# Patient Record
Sex: MALE | Race: BLACK OR AFRICAN AMERICAN | NOT HISPANIC OR LATINO | Employment: OTHER | URBAN - METROPOLITAN AREA
[De-identification: names, ages, dates, MRNs, and addresses within clinical notes are randomized per-mention and may not be internally consistent; named-entity substitution may affect disease eponyms.]

---

## 2018-07-09 PROBLEM — I48.0 PAROXYSMAL ATRIAL FIBRILLATION: Status: ACTIVE | Noted: 2018-07-09

## 2018-07-09 PROBLEM — I48.91 NEW ONSET ATRIAL FIBRILLATION: Status: ACTIVE | Noted: 2018-07-09

## 2018-07-09 PROBLEM — S06.5XAA SUBDURAL HEMATOMA: Status: ACTIVE | Noted: 2018-07-09

## 2018-07-09 PROBLEM — R20.0 NUMBNESS OF LEFT FOOT: Status: ACTIVE | Noted: 2018-07-09

## 2018-07-10 PROBLEM — I63.9 CVA (CEREBRAL VASCULAR ACCIDENT): Status: ACTIVE | Noted: 2018-07-10

## 2018-07-10 PROBLEM — I10 ESSENTIAL HYPERTENSION: Status: ACTIVE | Noted: 2018-07-10

## 2018-07-13 ENCOUNTER — PATIENT OUTREACH (OUTPATIENT)
Dept: ADMINISTRATIVE | Facility: CLINIC | Age: 78
End: 2018-07-13

## 2018-07-13 NOTE — PATIENT INSTRUCTIONS
"Paraesthesias  Paraesthesia is a burning or prickling sensation that is sometimes felt in the hands, arms, legs or feet. It can also occur in other parts of the body. It can also feel like tingling or numbness, skin crawling, or itching. The feeling is not comfortable, but it is not painful. (The "pins and needles" feeling that happens when a foot or hand "falls asleep" is a temporary paraesthesia.)  Paraesthesias that last or come and go may be caused by medical issues that need to be treated. These include stroke, a bulging disk pressing on a nerve, a trapped nerve, vitamin deficiencies, or even certain medicines.  Tests are often done. These tests may include blood tests, X-ray, CT (computerized tomography) scan, or a muscle test (electromyography). Depending on the cause, treatment may include physical therapy.  Home care  · Tell the healthcare provider about all medicines you take. This includes prescription and over-the-counter medicines, vitamins, and herbs. Ask if any of the medicines may be causing your problems. Do not make any changes to prescription medicines without talking to your healthcare provider first.  · You may be prescribed medicines to help relieve the tingling feeling or for pain. Take all medicines as directed.  · A numb hand or foot may be more prone to injury. To help protect it:  ? Always use oven mitts.  ? Test water with an unaffected hand or foot.  ? Use caution when trimming nails. File sharp areas.  ? Wear shoes that fit well to avoid pressure points, blisters, and ulcers.  ? Inspect your hands and feet carefully (including the soles of your feet and between your toes) at least once a week. If you see red areas, sores, or other problems, tell your healthcare provider.  Follow-up care  Follow up with your doctor or as advised by our staff. You may need further testing or evaluation.  When to seek medical advice  Call your healthcare provider right away if any of the following " occur:  · Numbness or weakness of the face, one arm, or one leg  · Slurred speech, confusion, trouble speaking, walking, or seeing  · Severe headache, fainting spell, dizziness, or seizure  · Chest, arm, neck, or upper back pain  · Loss of bladder or bowel control  · Open wound with redness, swelling, or pus  Date Last Reviewed: 9/25/2015  © 2729-7043 [employer]. [address] [place] [address], [place] [address]. All rights reserved. This information is not intended as a substitute for professional medical care. Always follow your healthcare professional's instructions.

## 2019-01-01 ENCOUNTER — HOSPITAL ENCOUNTER (OUTPATIENT)
Dept: RADIOLOGY | Facility: HOSPITAL | Age: 79
Discharge: HOME | End: 2019-06-21
Attending: PHYSICIAN ASSISTANT
Payer: MEDICARE

## 2019-01-01 ENCOUNTER — OFFICE VISIT (OUTPATIENT)
Dept: NEUROSURGERY | Facility: CLINIC | Age: 79
End: 2019-01-01
Payer: MEDICARE

## 2019-01-01 ENCOUNTER — TELEPHONE (OUTPATIENT)
Dept: NEUROSURGERY | Facility: CLINIC | Age: 79
End: 2019-01-01

## 2019-01-01 VITALS
HEIGHT: 68 IN | HEART RATE: 83 BPM | WEIGHT: 202 LBS | SYSTOLIC BLOOD PRESSURE: 127 MMHG | DIASTOLIC BLOOD PRESSURE: 75 MMHG | BODY MASS INDEX: 30.62 KG/M2 | TEMPERATURE: 98 F

## 2019-01-01 DIAGNOSIS — G93.89 BRAIN MASS: Primary | ICD-10-CM

## 2019-01-01 DIAGNOSIS — G93.89 BRAIN MASS: ICD-10-CM

## 2019-01-01 LAB
CREAT SERPL-MCNC: 0.9 MG/DL (ref 0.5–1.4)
SAMPLE: NORMAL

## 2019-01-01 PROCEDURE — 70553 MRI BRAIN STEM W/O & W/DYE: CPT | Mod: TC

## 2019-01-01 PROCEDURE — 25500020 PHARM REV CODE 255: Performed by: PHYSICIAN ASSISTANT

## 2019-01-01 PROCEDURE — A9585 GADOBUTROL INJECTION: HCPCS | Performed by: PHYSICIAN ASSISTANT

## 2019-01-01 PROCEDURE — 99999 PR PBB SHADOW E&M-EST. PATIENT-LVL III: CPT | Mod: PBBFAC,,, | Performed by: NEUROLOGICAL SURGERY

## 2019-01-01 PROCEDURE — 99213 OFFICE O/P EST LOW 20 MIN: CPT | Mod: PBBFAC,25 | Performed by: NEUROLOGICAL SURGERY

## 2019-01-01 PROCEDURE — 70553 MRI BRAIN STEM W/O & W/DYE: CPT | Mod: 26,,, | Performed by: RADIOLOGY

## 2019-01-01 PROCEDURE — 99214 PR OFFICE/OUTPT VISIT, EST, LEVL IV, 30-39 MIN: ICD-10-PCS | Mod: S$PBB,,, | Performed by: NEUROLOGICAL SURGERY

## 2019-01-01 PROCEDURE — 70553 MRI BRAIN W WO CONTRAST: ICD-10-PCS | Mod: 26,,, | Performed by: RADIOLOGY

## 2019-01-01 PROCEDURE — 99214 OFFICE O/P EST MOD 30 MIN: CPT | Mod: S$PBB,,, | Performed by: NEUROLOGICAL SURGERY

## 2019-01-01 PROCEDURE — 99999 PR PBB SHADOW E&M-EST. PATIENT-LVL III: ICD-10-PCS | Mod: PBBFAC,,, | Performed by: NEUROLOGICAL SURGERY

## 2019-01-01 RX ORDER — GADOBUTROL 604.72 MG/ML
10 INJECTION INTRAVENOUS
Status: COMPLETED | OUTPATIENT
Start: 2019-01-01 | End: 2019-01-01

## 2019-01-01 RX ADMIN — GADOBUTROL 10 ML: 604.72 INJECTION INTRAVENOUS at 11:06

## 2019-03-16 PROBLEM — R41.82 ALTERED MENTAL STATUS: Status: ACTIVE | Noted: 2019-03-16

## 2019-03-17 PROBLEM — D64.9 NORMOCYTIC ANEMIA: Status: ACTIVE | Noted: 2019-03-17

## 2019-03-17 PROBLEM — R80.1 PERSISTENT PROTEINURIA: Status: ACTIVE | Noted: 2019-03-17

## 2019-03-17 PROBLEM — I63.411 CEREBROVASCULAR ACCIDENT (CVA) DUE TO EMBOLISM OF RIGHT MIDDLE CEREBRAL ARTERY: Status: ACTIVE | Noted: 2018-07-10

## 2019-03-17 PROBLEM — I48.21 PERMANENT ATRIAL FIBRILLATION: Status: ACTIVE | Noted: 2018-07-09

## 2019-03-17 PROBLEM — C79.51 METASTATIC CARCINOMA TO BONE: Status: ACTIVE | Noted: 2019-03-17

## 2019-03-17 PROBLEM — E11.51 TYPE 2 DIABETES MELLITUS WITH DIABETIC PERIPHERAL ANGIOPATHY WITHOUT GANGRENE, WITHOUT LONG-TERM CURRENT USE OF INSULIN: Status: ACTIVE | Noted: 2019-03-17

## 2019-03-18 PROBLEM — R80.9 NEPHROTIC RANGE PROTEINURIA: Status: ACTIVE | Noted: 2019-03-17

## 2019-03-18 PROBLEM — R63.8 INCREASED NUTRITIONAL NEEDS: Status: ACTIVE | Noted: 2019-03-18

## 2019-03-18 PROBLEM — E63.9 INADEQUATE DIETARY ENERGY INTAKE: Status: ACTIVE | Noted: 2019-03-18

## 2019-03-18 PROBLEM — E55.9 VITAMIN D DEFICIENCY: Status: ACTIVE | Noted: 2019-03-18

## 2019-03-19 PROBLEM — R97.20 ELEVATED PSA: Status: ACTIVE | Noted: 2019-03-19

## 2019-03-19 PROBLEM — R56.9 SEIZURES: Status: ACTIVE | Noted: 2019-03-19

## 2019-03-26 ENCOUNTER — HOSPITAL ENCOUNTER (INPATIENT)
Facility: HOSPITAL | Age: 79
LOS: 8 days | Discharge: HOME HEALTH | DRG: 070 | End: 2019-04-03
Attending: HOSPITALIST | Admitting: HOSPITALIST
Payer: MEDICARE

## 2019-03-26 DIAGNOSIS — G93.89 BRAIN MASS: ICD-10-CM

## 2019-03-26 DIAGNOSIS — I48.91 A-FIB: ICD-10-CM

## 2019-03-26 DIAGNOSIS — I48.91 ATRIAL FIBRILLATION, UNSPECIFIED TYPE: ICD-10-CM

## 2019-03-26 DIAGNOSIS — Z71.89 GOALS OF CARE, COUNSELING/DISCUSSION: ICD-10-CM

## 2019-03-26 DIAGNOSIS — R56.9 SEIZURES: ICD-10-CM

## 2019-03-26 DIAGNOSIS — I63.411 CEREBROVASCULAR ACCIDENT (CVA) DUE TO EMBOLISM OF RIGHT MIDDLE CEREBRAL ARTERY: ICD-10-CM

## 2019-03-26 DIAGNOSIS — Z51.5 PALLIATIVE CARE ENCOUNTER: ICD-10-CM

## 2019-03-26 PROCEDURE — 11000001 HC ACUTE MED/SURG PRIVATE ROOM

## 2019-03-26 NOTE — PROGRESS NOTES
Please call extension [phone] (if nobody answers, this will flip to a beeper, so put in your call back number) upon patient arrival to floor for Hospital Medicine admit team assignment and for additional admit orders for the patient.  Do not page the attending, staff physician associate with the patient on arrival (may not be in-house at the time of arrival).  Rather, always call [phone] to reach the triage physician for orders and team assignment.        Outside Transfer Acceptance Note     Patients name: Bari Hogan     Transferring Physician or Mid-Level provider/Clinic giving report: Johnny Mike MD (Internist, [place])    Accepting Physician for admission to hospital: Ethel Edwards MD     Date of acceptance:  03/26/2019    Allergies: NKDA     Reason for transfer: Need for biopsy of left frontal brain mass/cranial erosion     HPI:  This Is are 79-year-old male with hypertension, CVA, and type 2 diabetes mellitus presented with altered mental status on 03/16/2019.  An MRI of the brain was done which was significant for a left frontal meningeal mass with mild local mass effect and adjacent osseous erosion. The differential includes dural metastasis and atypical meningioma.  There was also a subacute to chronic right temporoparietal infarct.  An EEG was done which was consistent with seizure activity and the patient was loaded with Keppra and this has continued for prophylaxis.  Bone scan revealed lytic lesions in the left frontal bone and vertebral column.  The patient had biopsy of the L2 spine and kyphoplasty.  The results were significant for mild plasmacytosis and patchy mild marrow fibrosis. The needs cranial biopsy for appropriate diagnosis and subsequent treatment plan.    VS: BP  169/84  P 84  R 17    T 96.7F  O2  Sats 97% RA    Labs:  K+  3.3    Radiographs: As above     To Do List upon arrival:  Consult Neurosurgery for brain biopsy (cranial sampling)

## 2019-03-27 PROBLEM — I73.9 PERIPHERAL VASCULAR DISEASE OF LOWER EXTREMITY: Status: ACTIVE | Noted: 2019-03-27

## 2019-03-27 PROBLEM — R23.9 ALTERATION IN SKIN INTEGRITY: Status: ACTIVE | Noted: 2019-03-27

## 2019-03-27 LAB
ALBUMIN SERPL BCP-MCNC: 2.2 G/DL (ref 3.5–5.2)
ALP SERPL-CCNC: 73 U/L (ref 55–135)
ALT SERPL W/O P-5'-P-CCNC: 10 U/L (ref 10–44)
ANION GAP SERPL CALC-SCNC: 11 MMOL/L (ref 8–16)
APTT BLDCRRT: 26.2 SEC (ref 21–32)
AST SERPL-CCNC: 17 U/L (ref 10–40)
BASOPHILS # BLD AUTO: 0.04 K/UL (ref 0–0.2)
BASOPHILS NFR BLD: 0.5 % (ref 0–1.9)
BILIRUB SERPL-MCNC: 0.4 MG/DL (ref 0.1–1)
BUN SERPL-MCNC: 8 MG/DL (ref 8–23)
CALCIUM SERPL-MCNC: 9 MG/DL (ref 8.7–10.5)
CHLORIDE SERPL-SCNC: 109 MMOL/L (ref 95–110)
CO2 SERPL-SCNC: 21 MMOL/L (ref 23–29)
CREAT SERPL-MCNC: 1.1 MG/DL (ref 0.5–1.4)
DIFFERENTIAL METHOD: ABNORMAL
EOSINOPHIL # BLD AUTO: 0.2 K/UL (ref 0–0.5)
EOSINOPHIL NFR BLD: 3.1 % (ref 0–8)
ERYTHROCYTE [DISTWIDTH] IN BLOOD BY AUTOMATED COUNT: 16.9 % (ref 11.5–14.5)
EST. GFR  (AFRICAN AMERICAN): >60 ML/MIN/1.73 M^2
EST. GFR  (NON AFRICAN AMERICAN): >60 ML/MIN/1.73 M^2
ESTIMATED AVG GLUCOSE: 194 MG/DL (ref 68–131)
GLUCOSE SERPL-MCNC: 99 MG/DL (ref 70–110)
HBA1C MFR BLD HPLC: 8.4 % (ref 4–5.6)
HCT VFR BLD AUTO: 38.6 % (ref 40–54)
HGB BLD-MCNC: 11.8 G/DL (ref 14–18)
IMM GRANULOCYTES # BLD AUTO: 0.04 K/UL (ref 0–0.04)
IMM GRANULOCYTES NFR BLD AUTO: 0.5 % (ref 0–0.5)
INR PPP: 1 (ref 0.8–1.2)
LYMPHOCYTES # BLD AUTO: 1.3 K/UL (ref 1–4.8)
LYMPHOCYTES NFR BLD: 17 % (ref 18–48)
MAGNESIUM SERPL-MCNC: 1.2 MG/DL (ref 1.6–2.6)
MCH RBC QN AUTO: 26.3 PG (ref 27–31)
MCHC RBC AUTO-ENTMCNC: 30.6 G/DL (ref 32–36)
MCV RBC AUTO: 86 FL (ref 82–98)
MONOCYTES # BLD AUTO: 0.7 K/UL (ref 0.3–1)
MONOCYTES NFR BLD: 8.3 % (ref 4–15)
NEUTROPHILS # BLD AUTO: 5.5 K/UL (ref 1.8–7.7)
NEUTROPHILS NFR BLD: 70.6 % (ref 38–73)
NRBC BLD-RTO: 0 /100 WBC
PHOSPHATE SERPL-MCNC: 2.5 MG/DL (ref 2.7–4.5)
PLATELET # BLD AUTO: 390 K/UL (ref 150–350)
PMV BLD AUTO: 11.5 FL (ref 9.2–12.9)
POCT GLUCOSE: 106 MG/DL (ref 70–110)
POCT GLUCOSE: 110 MG/DL (ref 70–110)
POCT GLUCOSE: 116 MG/DL (ref 70–110)
POCT GLUCOSE: 123 MG/DL (ref 70–110)
POCT GLUCOSE: 98 MG/DL (ref 70–110)
POTASSIUM SERPL-SCNC: 3.6 MMOL/L (ref 3.5–5.1)
PROT SERPL-MCNC: 7 G/DL (ref 6–8.4)
PROTHROMBIN TIME: 10.8 SEC (ref 9–12.5)
RBC # BLD AUTO: 4.49 M/UL (ref 4.6–6.2)
SODIUM SERPL-SCNC: 141 MMOL/L (ref 136–145)
TSH SERPL DL<=0.005 MIU/L-ACNC: 0.91 UIU/ML (ref 0.4–4)
WBC # BLD AUTO: 7.81 K/UL (ref 3.9–12.7)

## 2019-03-27 PROCEDURE — 25000003 PHARM REV CODE 250: Performed by: STUDENT IN AN ORGANIZED HEALTH CARE EDUCATION/TRAINING PROGRAM

## 2019-03-27 PROCEDURE — 99223 1ST HOSP IP/OBS HIGH 75: CPT | Mod: ,,, | Performed by: PHYSICIAN ASSISTANT

## 2019-03-27 PROCEDURE — 85025 COMPLETE CBC W/AUTO DIFF WBC: CPT

## 2019-03-27 PROCEDURE — 93010 EKG 12-LEAD: ICD-10-PCS | Mod: ,,, | Performed by: INTERNAL MEDICINE

## 2019-03-27 PROCEDURE — 36415 COLL VENOUS BLD VENIPUNCTURE: CPT

## 2019-03-27 PROCEDURE — 25500020 PHARM REV CODE 255: Performed by: HOSPITALIST

## 2019-03-27 PROCEDURE — 84443 ASSAY THYROID STIM HORMONE: CPT

## 2019-03-27 PROCEDURE — 99223 1ST HOSP IP/OBS HIGH 75: CPT | Mod: AI,GC,, | Performed by: HOSPITALIST

## 2019-03-27 PROCEDURE — A9585 GADOBUTROL INJECTION: HCPCS | Performed by: HOSPITALIST

## 2019-03-27 PROCEDURE — 80053 COMPREHEN METABOLIC PANEL: CPT

## 2019-03-27 PROCEDURE — 99223 PR INITIAL HOSPITAL CARE,LEVL III: ICD-10-PCS | Mod: AI,GC,, | Performed by: HOSPITALIST

## 2019-03-27 PROCEDURE — 83036 HEMOGLOBIN GLYCOSYLATED A1C: CPT

## 2019-03-27 PROCEDURE — 99223 PR INITIAL HOSPITAL CARE,LEVL III: ICD-10-PCS | Mod: ,,, | Performed by: PHYSICIAN ASSISTANT

## 2019-03-27 PROCEDURE — 11000001 HC ACUTE MED/SURG PRIVATE ROOM

## 2019-03-27 PROCEDURE — 83735 ASSAY OF MAGNESIUM: CPT

## 2019-03-27 PROCEDURE — 93005 ELECTROCARDIOGRAM TRACING: CPT

## 2019-03-27 PROCEDURE — 85610 PROTHROMBIN TIME: CPT

## 2019-03-27 PROCEDURE — 93010 ELECTROCARDIOGRAM REPORT: CPT | Mod: ,,, | Performed by: INTERNAL MEDICINE

## 2019-03-27 PROCEDURE — 63600175 PHARM REV CODE 636 W HCPCS: Performed by: STUDENT IN AN ORGANIZED HEALTH CARE EDUCATION/TRAINING PROGRAM

## 2019-03-27 PROCEDURE — 85730 THROMBOPLASTIN TIME PARTIAL: CPT

## 2019-03-27 PROCEDURE — 84100 ASSAY OF PHOSPHORUS: CPT

## 2019-03-27 RX ORDER — SODIUM CHLORIDE 0.9 % (FLUSH) 0.9 %
5 SYRINGE (ML) INJECTION
Status: DISCONTINUED | OUTPATIENT
Start: 2019-03-27 | End: 2019-04-04 | Stop reason: HOSPADM

## 2019-03-27 RX ORDER — GLUCAGON 1 MG
1 KIT INJECTION
Status: DISCONTINUED | OUTPATIENT
Start: 2019-03-27 | End: 2019-03-29

## 2019-03-27 RX ORDER — ALLOPURINOL 100 MG/1
200 TABLET ORAL DAILY
Status: ON HOLD | COMMUNITY
End: 2019-04-03 | Stop reason: SDUPTHER

## 2019-03-27 RX ORDER — HEPARIN SODIUM,PORCINE/D5W 25000/250
12 INTRAVENOUS SOLUTION INTRAVENOUS CONTINUOUS
Status: DISCONTINUED | OUTPATIENT
Start: 2019-03-27 | End: 2019-04-01

## 2019-03-27 RX ORDER — DICLOFENAC SODIUM 10 MG/G
2 GEL TOPICAL DAILY PRN
Status: ON HOLD | COMMUNITY
End: 2019-04-26 | Stop reason: HOSPADM

## 2019-03-27 RX ORDER — AMLODIPINE BESYLATE 10 MG/1
10 TABLET ORAL DAILY
Status: DISCONTINUED | OUTPATIENT
Start: 2019-03-27 | End: 2019-04-04 | Stop reason: HOSPADM

## 2019-03-27 RX ORDER — DOCUSATE SODIUM 100 MG/1
100 CAPSULE, LIQUID FILLED ORAL DAILY
Status: DISCONTINUED | OUTPATIENT
Start: 2019-03-27 | End: 2019-03-29 | Stop reason: ALTCHOICE

## 2019-03-27 RX ORDER — ATORVASTATIN CALCIUM 20 MG/1
40 TABLET, FILM COATED ORAL DAILY
Status: DISCONTINUED | OUTPATIENT
Start: 2019-03-27 | End: 2019-04-04 | Stop reason: HOSPADM

## 2019-03-27 RX ORDER — ASCORBIC ACID 500 MG
500 TABLET ORAL 3 TIMES DAILY
Status: DISCONTINUED | OUTPATIENT
Start: 2019-03-27 | End: 2019-04-04 | Stop reason: HOSPADM

## 2019-03-27 RX ORDER — GADOBUTROL 604.72 MG/ML
10 INJECTION INTRAVENOUS
Status: COMPLETED | OUTPATIENT
Start: 2019-03-27 | End: 2019-03-27

## 2019-03-27 RX ORDER — LEVETIRACETAM 750 MG/1
750 TABLET ORAL 2 TIMES DAILY
Status: DISCONTINUED | OUTPATIENT
Start: 2019-03-27 | End: 2019-04-04 | Stop reason: HOSPADM

## 2019-03-27 RX ORDER — IBUPROFEN 200 MG
16 TABLET ORAL
Status: DISCONTINUED | OUTPATIENT
Start: 2019-03-27 | End: 2019-04-04 | Stop reason: HOSPADM

## 2019-03-27 RX ORDER — ACETAMINOPHEN 325 MG/1
650 TABLET ORAL ONCE
Status: COMPLETED | OUTPATIENT
Start: 2019-03-27 | End: 2019-03-27

## 2019-03-27 RX ORDER — GLUCAGON 1 MG
1 KIT INJECTION
Status: DISCONTINUED | OUTPATIENT
Start: 2019-03-27 | End: 2019-03-27

## 2019-03-27 RX ORDER — METOPROLOL SUCCINATE 25 MG/1
25 TABLET, EXTENDED RELEASE ORAL DAILY
Status: ON HOLD | COMMUNITY
End: 2019-04-03 | Stop reason: SDUPTHER

## 2019-03-27 RX ORDER — FERROUS GLUCONATE 324(38)MG
325 TABLET ORAL
Status: DISCONTINUED | OUTPATIENT
Start: 2019-03-27 | End: 2019-03-30

## 2019-03-27 RX ORDER — INSULIN ASPART 100 [IU]/ML
0-5 INJECTION, SOLUTION INTRAVENOUS; SUBCUTANEOUS EVERY 6 HOURS PRN
Status: DISCONTINUED | OUTPATIENT
Start: 2019-03-27 | End: 2019-03-29

## 2019-03-27 RX ORDER — IBUPROFEN 200 MG
24 TABLET ORAL
Status: DISCONTINUED | OUTPATIENT
Start: 2019-03-27 | End: 2019-04-04 | Stop reason: HOSPADM

## 2019-03-27 RX ADMIN — OXYCODONE HYDROCHLORIDE AND ACETAMINOPHEN 500 MG: 500 TABLET ORAL at 02:03

## 2019-03-27 RX ADMIN — LEVETIRACETAM 750 MG: 750 TABLET, FILM COATED ORAL at 09:03

## 2019-03-27 RX ADMIN — AMLODIPINE BESYLATE 10 MG: 10 TABLET ORAL at 12:03

## 2019-03-27 RX ADMIN — DOCUSATE SODIUM 100 MG: 100 CAPSULE, LIQUID FILLED ORAL at 12:03

## 2019-03-27 RX ADMIN — ACETAMINOPHEN 650 MG: 325 TABLET ORAL at 01:03

## 2019-03-27 RX ADMIN — GADOBUTROL 10 ML: 604.72 INJECTION INTRAVENOUS at 05:03

## 2019-03-27 RX ADMIN — HEPARIN SODIUM AND DEXTROSE 12 UNITS/KG/HR: 10000; 5 INJECTION INTRAVENOUS at 08:03

## 2019-03-27 RX ADMIN — FERROUS GLUCONATE TAB 324 MG (37.5 MG ELEMENTAL IRON) 324 MG: 324 (37.5 FE) TAB at 07:03

## 2019-03-27 RX ADMIN — OXYCODONE HYDROCHLORIDE AND ACETAMINOPHEN 500 MG: 500 TABLET ORAL at 09:03

## 2019-03-27 RX ADMIN — ATORVASTATIN CALCIUM 40 MG: 20 TABLET, FILM COATED ORAL at 12:03

## 2019-03-27 RX ADMIN — METOPROLOL SUCCINATE 12.5 MG: 25 TABLET, EXTENDED RELEASE ORAL at 09:03

## 2019-03-27 NOTE — HPI
Mr Hogan is a 79 y.o M w/ a history significant for hypertension, CVA, and type 2 diabetes mellitus presented to OSH w/ altered mental status on 03/16/2019.  An MRI of the brain was done which was significant for a left frontal meningeal mass with mild local mass effect and adjacent osseous erosion. The differential includes dural metastasis and atypical meningioma.  There was also a subacute to chronic right temporoparietal infarct.  An EEG was done which was consistent with seizure activity and the patient was loaded with Keppra and this has continued for prophylaxis.  Bone scan revealed lytic lesions in the left frontal bone and vertebral column.  The patient had biopsy of the L2 spine and kyphoplasty.  The results were significant for mild plasmacytosis and patchy mild marrow fibrosis.     [place] [facility] [place]ities while patient awaits brain biopsy by neurosurgery

## 2019-03-27 NOTE — SUBJECTIVE & OBJECTIVE
Past Medical History:   Diagnosis Date    Arthritis     Chronic constipation     Diabetes mellitus     Hypertension     Stroke     x2       Past Surgical History:   Procedure Laterality Date    Kyphoplasty-L2 and biopsy N/A 3/20/2019    Performed by Satrhak Son MD at [facility] OR       Review of patient's allergies indicates:   Allergen Reactions    Lisinopril      dizziness       Current Facility-Administered Medications on File Prior to Encounter   Medication    [COMPLETED] potassium chloride SA CR tablet 30 mEq    [DISCONTINUED] acetaminophen tablet 325 mg    [DISCONTINUED] albuterol-ipratropium 2.5 mg-0.5 mg/3 mL nebulizer solution 3 mL    [DISCONTINUED] atorvastatin tablet 40 mg    [DISCONTINUED] cyproheptadine 4 mg tablet 4 mg    [DISCONTINUED] dextrose 50% injection 12.5 g    [DISCONTINUED] dextrose 50% injection 25 g    [DISCONTINUED] docusate sodium capsule 100 mg    [DISCONTINUED] enoxaparin injection 40 mg    [DISCONTINUED] ergocalciferol capsule 50,000 Units    [DISCONTINUED] ferrous sulfate tablet 325 mg    [DISCONTINUED] glucagon (human recombinant) injection 1 mg    [DISCONTINUED] glucose chewable tablet 16 g    [DISCONTINUED] glucose chewable tablet 24 g    [DISCONTINUED] HYDROmorphone injection 0.5 mg    [DISCONTINUED] insulin aspart U-100 injection 1-14 Units    [DISCONTINUED] levETIRAcetam tablet 750 mg    [DISCONTINUED] ondansetron injection 4 mg    [DISCONTINUED] pantoprazole EC tablet 40 mg    [DISCONTINUED] polyethylene glycol packet 17 g     Current Outpatient Medications on File Prior to Encounter   Medication Sig    acetaminophen (TYLENOL) 325 MG tablet Take 1 tablet (325 mg total) by mouth every 6 (six) hours as needed.    albuterol-ipratropium (DUO-NEB) 2.5 mg-0.5 mg/3 mL nebulizer solution Take 3 mLs by nebulization every 8 (eight) hours. Rescue    amLODIPine (NORVASC) 10 MG tablet Take 10 mg by mouth once daily.    apixaban 5 mg Tab Take 1 tablet (5 mg  total) by mouth 2 (two) times daily.    atorvastatin (LIPITOR) 40 MG tablet Take 1 tablet (40 mg total) by mouth once daily.    cyproheptadine (PERIACTIN) 4 mg tablet Take 1 tablet (4 mg total) by mouth 3 (three) times daily before meals.    dextrose 50% injection Inject 25 mLs (12.5 g total) into the vein as needed (between 51 - 70 mg/dL if patient cannnot take PO but has IV access.).    dextrose 50% injection Inject 50 mLs (25 g total) into the vein as needed (less than 50 mg/dL if patient cannnot take PO but has IV access.).    docusate sodium (COLACE) 100 MG capsule Take 1 capsule (100 mg total) by mouth once daily.    [START ON 4/1/2019] ergocalciferol (ERGOCALCIFEROL) 50,000 unit Cap Take 1 capsule (50,000 Units total) by mouth every 7 days.    ferrous gluconate (FERGON) 325 MG Tab Take 325 mg by mouth daily with breakfast.    ferrous sulfate (FEOSOL) 325 mg (65 mg iron) Tab tablet Take 1 tablet (325 mg total) by mouth 2 (two) times daily.    glipiZIDE (GLUCOTROL) 10 MG tablet Take 10 mg by mouth 2 (two) times daily before meals.    glucose 4 GM chewable tablet Take 4 tablets (16 g total) by mouth as needed.    glucose 4 GM chewable tablet Take 6 tablets (24 g total) by mouth as needed.    insulin aspart U-100 (NOVOLOG) 100 unit/mL injection Inject 1-14 Units into the skin before meals and at bedtime as needed.    levETIRAcetam (KEPPRA) 750 MG Tab Take 1 tablet (750 mg total) by mouth 2 (two) times daily.    ondansetron 4 mg/2 mL Soln Inject 4 mg into the vein every 6 (six) hours as needed.    pantoprazole (PROTONIX) 40 MG tablet Take 1 tablet (40 mg total) by mouth once daily.    polyethylene glycol (GLYCOLAX) 17 gram PwPk Take 17 g by mouth once daily.    psyllium (METAMUCIL) packet Take 1 packet by mouth once daily.    terazosin (HYTRIN) 2 MG capsule Take 2 mg by mouth every evening.    triamterene-hydrochlorothiazide 75-50 mg (MAXZIDE) 75-50 mg per tablet Take 1 tablet by mouth once  daily.     Family History     None        Tobacco Use    Smoking status: Former Smoker   Substance and Sexual Activity    Alcohol use: No    Drug use: No    Sexual activity: Not on file     Review of Systems   Constitutional: Positive for activity change. Negative for appetite change and fever.   HENT: Negative for trouble swallowing.    Respiratory: Negative for cough, shortness of breath and wheezing.    Cardiovascular: Negative for chest pain and palpitations.   Gastrointestinal: Positive for constipation. Negative for abdominal pain, nausea and vomiting.   Genitourinary: Negative for decreased urine volume and hematuria.        [place]    Musculoskeletal: Positive for gait problem. Negative for back pain.        Left AKA   Skin: Positive for wound (right lateral leg  ulcers ).   Neurological: Positive for weakness (L>R). Negative for dizziness, seizures and speech difficulty.   Psychiatric/Behavioral: Negative for confusion. The patient is not nervous/anxio      Objective:     Vital Signs (Most Recent):  Temp: 97.5 °F (36.4 °C) (03/26/19 2316)  Pulse: 91 (03/26/19 2316)  Resp: 20 (03/26/19 2316)  BP: (!) 160/73 (03/26/19 2316)  SpO2: 98 % (03/26/19 2316) Vital Signs (24h Range):  Temp:  [96.1 °F (35.6 °C)-98 °F (36.7 °C)] 97.5 °F (36.4 °C)  Pulse:  [82-93] 91  Resp:  [16-20] 20  SpO2:  [98 %] 98 %  BP: (135-169)/(73-88) 160/73     Weight: 89.8 kg (198 lb)  Body mass index is 30.11 kg/m².    Constitutional: He is oriented to person, place, and time. He appears well-developed and well-nourished. He is cooperative.   HENT:   Head: Normocephalic and atraumatic.   Eyes: Pupils are equal, round, and reactive to light. Conjunctivae and EOM are normal.   Neck: Normal range of motion. Neck supple.   Cardiovascular: Normal rate and regular rhythm.   Pulmonary/Chest: Effort normal and breath sounds normal.   Abdominal: Soft. Bowel sounds are normal.   Musculoskeletal:   LEFT AKA    Neurological: He is alert and oriented  to person, place, and time.   Skin: Skin is warm and dry.         Physical Exam   Constitutional: He is oriented to person, place, and time. He appears well-developed and well-nourished.   HENT:   Head: Normocephalic and atraumatic.   Eyes: Pupils are equal, round, and reactive to light. EOM are normal.   Neck: Normal range of motion. Neck supple.   Cardiovascular: Normal rate and regular rhythm.   Pulmonary/Chest: Effort normal and breath sounds normal.   Abdominal: Soft. Bowel sounds are normal.   Musculoskeletal: Normal range of motion. He exhibits deformity.   Increased tone/spasticity of the LUE throughout (chronic, dt old stroke)       Neurological: He is alert and oriented to person, place, and time.   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  Extraocular motions are normal.        Significant Labs: All pertinent labs within the past 24 hours have been reviewed.    Significant Imaging: I have reviewed all pertinent imaging results/findings within the past 24 hours.

## 2019-03-27 NOTE — ASSESSMENT & PLAN NOTE
As noted on CT abd pelvis.  Source of these metastases is unclear.  Cannot rule out the possibility of CNS metastasis as well. Bone scan showing left frontal meningeal mass and lytic lesion on L2 consistent metastasis. There was also no lesions/mass seen in the chest which could indicate possible primary source in the  tract or thyroid. Thyroid ultrasound with no evidence of malignancy   - s/p L2 lesion pathology findings showing a 5-10% representation of plasma cells. This could be secondary to an abnormal reaction to something or the beginnings of multiple myeloma. The small percentage doesn't qualify for MM but it could be a result of such diseases including lymphoma or  waldenstom's macroglobulinemia. Patient can also have isolated plasmacytoma in the brain. If such is diagnosed then the treatment may require localized radiation.     - Patient will need biopsy of brain mass to confirm.   - urine kappa/lambda 1.61

## 2019-03-27 NOTE — ASSESSMENT & PLAN NOTE
Patient is a 79 year old male with PMHx HTN, DM2, recurrent Afib and CVAs on Eliquis who presents as a transfer from [facility] for cranial biopsy. Presented to [facility] on 3/16 with suspected seizure activity, currently on Keppra 750mg BID for ppx.     - Neurologically stable  - MRI brain significant for left frontal meningioma with mild mass effect and bony erosion and subacute to chronic right temporoparietal infarct.  - Will obtain MRI brain Ste[facility] without fudicials for surgical planning.   - Plan for OR Tuesday 4/2 for left frontal craniotomy for tumor resection  - Will need documented surgical clearance from primary team.   - Continue Keppra 750mg BID.   - Dc Eliquis today in preparation for surgery  - Remainder of care per primary  - Will continue to follow. Please page with any questions or changes in exam.   - Discussed with Dr. Campoverde.

## 2019-03-27 NOTE — ASSESSMENT & PLAN NOTE
MRI of the brain with contrast revealed:  1. 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  Differential includes dural metastasis an atypical meningioma.  2. 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    Left frontal meningeal mass has been present on imaging since July 2018.     ?? Whether the left frontal meningeal mass with bony erosion is something other than the vertebral lesion @ L2. The L2 biopsy results are as follows:   - Mild plasmacytosis.   - Patchy mild marrow fibrosis.   - Focal crush artifact.   - Results of outside consultative review and final diagnosis to follow  in an addendum report.      - NPO at midnight  - neurosurgery consult

## 2019-03-27 NOTE — ASSESSMENT & PLAN NOTE
Cannot rule out the possibility of embolic event      - repeat EKG  - will start metoprolol 12.5mg for rate control   - consider restarting anticoagulation after biopsy

## 2019-03-27 NOTE — SUBJECTIVE & OBJECTIVE
Medications Prior to Admission   Medication Sig Dispense Refill Last Dose    acetaminophen (TYLENOL) 325 MG tablet Take 1 tablet (325 mg total) by mouth every 6 (six) hours as needed.  0     albuterol-ipratropium (DUO-NEB) 2.5 mg-0.5 mg/3 mL nebulizer solution Take 3 mLs by nebulization every 8 (eight) hours. Rescue 1 Box 0     amLODIPine (NORVASC) 10 MG tablet Take 10 mg by mouth once daily.   Taking    apixaban 5 mg Tab Take 1 tablet (5 mg total) by mouth 2 (two) times daily. 60 tablet 0 Taking    atorvastatin (LIPITOR) 40 MG tablet Take 1 tablet (40 mg total) by mouth once daily. 90 tablet 3     cyproheptadine (PERIACTIN) 4 mg tablet Take 1 tablet (4 mg total) by mouth 3 (three) times daily before meals.  0     dextrose 50% injection Inject 25 mLs (12.5 g total) into the vein as needed (between 51 - 70 mg/dL if patient cannnot take PO but has IV access.).       dextrose 50% injection Inject 50 mLs (25 g total) into the vein as needed (less than 50 mg/dL if patient cannnot take PO but has IV access.).       docusate sodium (COLACE) 100 MG capsule Take 1 capsule (100 mg total) by mouth once daily.  0     [START ON 4/1/2019] ergocalciferol (ERGOCALCIFEROL) 50,000 unit Cap Take 1 capsule (50,000 Units total) by mouth every 7 days.       ferrous gluconate (FERGON) 325 MG Tab Take 325 mg by mouth daily with breakfast.   Taking    ferrous sulfate (FEOSOL) 325 mg (65 mg iron) Tab tablet Take 1 tablet (325 mg total) by mouth 2 (two) times daily.  0     glipiZIDE (GLUCOTROL) 10 MG tablet Take 10 mg by mouth 2 (two) times daily before meals.   Taking    glucose 4 GM chewable tablet Take 4 tablets (16 g total) by mouth as needed.  12     glucose 4 GM chewable tablet Take 6 tablets (24 g total) by mouth as needed.  12     insulin aspart U-100 (NOVOLOG) 100 unit/mL injection Inject 1-14 Units into the skin before meals and at bedtime as needed.       levETIRAcetam (KEPPRA) 750 MG Tab Take 1 tablet (750 mg  total) by mouth 2 (two) times daily. 60 tablet 11     ondansetron 4 mg/2 mL Soln Inject 4 mg into the vein every 6 (six) hours as needed.       pantoprazole (PROTONIX) 40 MG tablet Take 1 tablet (40 mg total) by mouth once daily. 30 tablet 11     polyethylene glycol (GLYCOLAX) 17 gram PwPk Take 17 g by mouth once daily.  0     psyllium (METAMUCIL) packet Take 1 packet by mouth once daily.   Taking    terazosin (HYTRIN) 2 MG capsule Take 2 mg by mouth every evening.   Taking    triamterene-hydrochlorothiazide 75-50 mg (MAXZIDE) 75-50 mg per tablet Take 1 tablet by mouth once daily.   Taking       Review of patient's allergies indicates:   Allergen Reactions    Lisinopril      dizziness       Past Medical History:   Diagnosis Date    Arthritis     Chronic constipation     Diabetes mellitus     Hypertension     Stroke     x2     Past Surgical History:   Procedure Laterality Date    Kyphoplasty-L2 and biopsy N/A 3/20/2019    [facility]     Family History     None        Tobacco Use    Smoking status: Former Smoker   Substance and Sexual Activity    Alcohol use: No    Drug use: No    Sexual activity: Not on file     Review of Systems   Constitutional: no fever, chills or night sweats. No changes in weight   Eyes: no visual changes   ENT: no nasal congestion or sore throat   Respiratory: no cough or shortness of breath   Cardiovascular: no chest pain or palpitations   Gastrointestinal: no nausea or vomiting   Genitourinary: no hematuria or dysuria   Integument/Breast: no rash or pruritis   Hematologic/Lymphatic: no easy bruising or lymphadenopathy   Musculoskeletal: no arthralgias or myalgias.   Neurological: no seizures or tremors   Behavioral/Psych: no auditory or visual hallucinations   Endocrine: no heat or cold intolerance       Objective:     Weight: 89.8 kg (198 lb)  Body mass index is 30.11 kg/m².  Vital Signs (Most Recent):  Temp: 98.4 °F (36.9 °C) (03/27/19  "1220)  Pulse: 106 (03/27/19 1536)  Resp: 17 (03/27/19 1220)  BP: (!) 185/90 (03/27/19 1220)  SpO2: 96 % (03/27/19 1220) Vital Signs (24h Range):  Temp:  [96.1 °F (35.6 °C)-98.4 °F (36.9 °C)] 98.4 °F (36.9 °C)  Pulse:  [] 106  Resp:  [16-20] 17  SpO2:  [93 %-98 %] 96 %  BP: (135-195)/() 185/90                          Urethral Catheter 03/19/19 1815 16 Fr. (Active)   Site Assessment Clean;Intact 3/27/2019 12:20 PM   Collection Container Standard drainage bag 3/27/2019 12:20 PM   Securement Method secured to top of thigh w/ adhesive device 3/27/2019 12:20 PM   Catheter Care Performed yes 3/27/2019 12:20 PM   Reason for Continuing Urinary Catheterization Urinary retention 3/27/2019 12:20 PM   CAUTI Prevention Bundle StatLock in place w 1" slack;Drainage bag off the floor;Green sheeting clip in use;Intact seal between catheter & drainage tubing;Drainage bag not overfilled (<2/3 full);Drainage bag below bladder;No dependent loops or kinks 3/27/2019 12:20 PM   Output (mL) 1900 mL 3/23/2019  5:00 AM       Neurosurgery Physical Exam    General: well developed, well nourished, no distress.   Head: normocephalic, atraumatic  Neurologic: Alert and oriented. Thought content appropriate.  GCS: Motor: 6/Verbal: 5/Eyes: 4 GCS Total: 15  Mental Status: Awake, Alert, Oriented x 4  Language: No aphasia  Speech: No dysarthria  Cranial nerves: face symmetric, tongue midline, CN II-XII grossly intact.   Eyes: pupils equal, round, reactive to light with accomodation, EOMI.  Pulmonary: normal respirations, no signs of respiratory distress  Abdomen: soft, non-distended, not tender to palpation  Sensory: intact to light touch throughout  Motor Strength: Moves all extremities spontaneously with good tone.  RUE/RLE full strength 5/5. LUE with residual weakness from prior strokes, 2/5 throughout. LLE with AKA. No abnormal movements seen.   Pronator Drift: no drift noted on right, unable to assess left  Finger-to-nose: Intact on " right, unable to assess on left  Gordillo: + left  Clonus: absent  Babinski: absent  Pulses: 2+ and symmetric radial and dorsalis pedis.  Skin: Skin is warm, dry and intact.  No midline tenderness    Significant Labs:  Recent Labs   Lab 03/26/19  0601 03/27/19  0440   * 99    141   K 3.3* 3.6   * 109   CO2 24 21*   BUN 8* 8   CREATININE 1.00 1.1   CALCIUM 8.4 9.0   MG 1.7 1.2*     Recent Labs   Lab 03/26/19  0601 03/27/19  0440   WBC 6.00 7.81   HGB 11.5* 11.8*   HCT 35.6* 38.6*    390*     Recent Labs   Lab 03/27/19  0440   INR 1.0   APTT 26.2     Microbiology Results (last 7 days)     ** No results found for the last 168 hours. **        Recent Lab Results       03/27/19  1236   03/27/19  0546   03/27/19  0440   03/27/19  0224   03/27/19  0006        Immature Grans (Abs)     0.04  Comment:  Mild elevation in immature granulocytes is non specific and   can be seen in a variety of conditions including stress response,   acute inflammation, trauma and pregnancy. Correlation with other   laboratory and clinical findings is essential.           Albumin     2.2         Alkaline Phosphatase     73         ALT     10         Anion Gap     11         aPTT     26.2  Comment:  aPTT therapeutic range = 39-69 seconds         AST     17         Baso #     0.04         Basophil%     0.5         Total Bilirubin     0.4  Comment:  For infants and newborns, interpretation of results should be based  on gestational age, weight and in agreement with clinical  observations.  Premature Infant recommended reference ranges:  Up to 24 hours.............<8.0 mg/dL  Up to 48 hours............<12.0 mg/dL  3-5 days..................<15.0 mg/dL  6-29 days.................<15.0 mg/dL           BUN, Bld     8         Calcium     9.0         Chloride     109         CO2     21         Creatinine     1.1         Differential Method     Automated         eGFR if      >60.0         eGFR if non African  American     >60.0  Comment:  Calculation used to obtain the estimated glomerular filtration  rate (eGFR) is the CKD-EPI equation.            Eos #     0.2         Eosinophil%     3.1         Estimated Avg Glucose       194       Glucose     99         Gran # (ANC)     5.5         Gran%     70.6         Hematocrit     38.6         Hemoglobin     11.8         Hemoglobin A1C External       8.4  Comment:  ADA Screening Guidelines:  5.7-6.4%  Consistent with prediabetes  >or=6.5%  Consistent with diabetes  High levels of fetal hemoglobin interfere with the HbA1C  assay. Heterozygous hemoglobin variants (HbS, HgC, etc)do  not significantly interfere with this assay.   However, presence of multiple variants may affect accuracy.         Immature Granulocytes     0.5         Coumadin Monitoring INR     1.0  Comment:  Coumadin Therapy:  2.0 - 3.0 for INR for all indicators except mechanical heart valves  and antiphospholipid syndromes which should use 2.5 - 3.5.           Lymph #     1.3         Lymph%     17.0         Magnesium     1.2         MCH     26.3         MCHC     30.6         MCV     86         Mono #     0.7         Mono%     8.3         MPV     11.5         nRBC     0         Phosphorus     2.5         Platelets     390         POC Glucose               POCT Glucose 98 110     123     Potassium     3.6         Total Protein     7.0         Protime     10.8         RBC     4.49         RDW     16.9         Sodium     141         TSH     0.909         WBC     7.81                          03/26/19  2105        Immature Grans (Abs)       Albumin       Alkaline Phosphatase       ALT       Anion Gap       aPTT       AST       Baso #       Basophil%       Total Bilirubin       BUN, Bld       Calcium       Chloride       CO2       Creatinine       Differential Method       eGFR if        eGFR if non        Eos #       Eosinophil%       Estimated Avg Glucose       Glucose       Gran #  (ANC)       Gran%       Hematocrit       Hemoglobin       Hemoglobin A1C External       Immature Granulocytes       Coumadin Monitoring INR       Lymph #       Lymph%       Magnesium       MCH       MCHC       MCV       Mono #       Mono%       MPV       nRBC       Phosphorus       Platelets       POC Glucose 128     POCT Glucose       Potassium       Total Protein       Protime       RBC       RDW       Sodium       TSH       WBC           All pertinent labs from the last 24 hours have been reviewed.    Significant Diagnostics:  All pertinent imaging reviewed.

## 2019-03-27 NOTE — CONSULTS
[facility]  Neurosurgery  Consult Note    Consults  Subjective:     Chief Complaint/Reason for Admission: brain biopsy    History of Present Illness: Patient is a 79 year old male with PMHx HTN, DM2, recurrent Afib and CVAs on Eliquis who [place] [facility] [place]. [place] [facility] 3/16 with suspected seizure activity. MRI brain at the time significant for left frontal meningioma with mild mass effect and bony erosion and subacute to chronic right temporoparietal infarct. EEG revealed seizure activity, patient loaded with Keppra and has been on 750mg BID since that time for ppx. Bone scan revealed lytic lesions in left frontal bone and L2 vertebrae. Underwent L2 biopsy and kyphoplasty by orthopedics at [facility] [place]. Results were significant for mild plasmacytosis and patchy mild marrow fibrosis. NSGY consulted for cranial biopsy for diagnosis and further treatment planning. Today patient denies headache, visual changes, weakness, numbness, paresthesias, neck pain, back pain. Residual left sided weakness from multiple strokes over the past 4 years. AKA of LLE. Denies hx of cancer, f/c.                    Medications Prior to Admission   Medication Sig Dispense Refill Last Dose    acetaminophen (TYLENOL) 325 MG tablet Take 1 tablet (325 mg total) by mouth every 6 (six) hours as needed.  0     albuterol-ipratropium (DUO-NEB) 2.5 mg-0.5 mg/3 mL nebulizer solution Take 3 mLs by nebulization every 8 (eight) hours. Rescue 1 Box 0     amLODIPine (NORVASC) 10 MG tablet Take 10 mg by mouth once daily.   Taking    apixaban 5 mg Tab Take 1 tablet (5 mg total) by mouth 2 (two) times daily. 60 tablet 0 Taking    atorvastatin (LIPITOR) 40 MG tablet Take 1 tablet (40 mg total) by mouth once daily. 90 tablet 3     cyproheptadine (PERIACTIN) 4 mg tablet Take 1 tablet (4 mg total) by mouth 3 (three) times daily before meals.  0     dextrose 50% injection Inject 25 mLs (12.5 g total)  into the vein as needed (between 51 - 70 mg/dL if patient cannnot take PO but has IV access.).       dextrose 50% injection Inject 50 mLs (25 g total) into the vein as needed (less than 50 mg/dL if patient cannnot take PO but has IV access.).       docusate sodium (COLACE) 100 MG capsule Take 1 capsule (100 mg total) by mouth once daily.  0     [START ON 4/1/2019] ergocalciferol (ERGOCALCIFEROL) 50,000 unit Cap Take 1 capsule (50,000 Units total) by mouth every 7 days.       ferrous gluconate (FERGON) 325 MG Tab Take 325 mg by mouth daily with breakfast.   Taking    ferrous sulfate (FEOSOL) 325 mg (65 mg iron) Tab tablet Take 1 tablet (325 mg total) by mouth 2 (two) times daily.  0     glipiZIDE (GLUCOTROL) 10 MG tablet Take 10 mg by mouth 2 (two) times daily before meals.   Taking    glucose 4 GM chewable tablet Take 4 tablets (16 g total) by mouth as needed.  12     glucose 4 GM chewable tablet Take 6 tablets (24 g total) by mouth as needed.  12     insulin aspart U-100 (NOVOLOG) 100 unit/mL injection Inject 1-14 Units into the skin before meals and at bedtime as needed.       levETIRAcetam (KEPPRA) 750 MG Tab Take 1 tablet (750 mg total) by mouth 2 (two) times daily. 60 tablet 11     ondansetron 4 mg/2 mL Soln Inject 4 mg into the vein every 6 (six) hours as needed.       pantoprazole (PROTONIX) 40 MG tablet Take 1 tablet (40 mg total) by mouth once daily. 30 tablet 11     polyethylene glycol (GLYCOLAX) 17 gram PwPk Take 17 g by mouth once daily.  0     psyllium (METAMUCIL) packet Take 1 packet by mouth once daily.   Taking    terazosin (HYTRIN) 2 MG capsule Take 2 mg by mouth every evening.   Taking    triamterene-hydrochlorothiazide 75-50 mg (MAXZIDE) 75-50 mg per tablet Take 1 tablet by mouth once daily.   Taking       Review of patient's allergies indicates:   Allergen Reactions    Lisinopril      dizziness       Past Medical History:   Diagnosis Date    Arthritis     Chronic constipation   "   Diabetes mellitus     Hypertension     Stroke     x2     Past Surgical History:   Procedure Laterality Date    Kyphoplasty-L2 and biopsy N/A 3/20/2019    Performed by Sarthak Son MD at [facility] OR     Family History     None        Tobacco Use    Smoking status: Former Smoker   Substance and Sexual Activity    Alcohol use: No    Drug use: No    Sexual activity: Not on file     Review of Systems   Constitutional: no fever, chills or night sweats. No changes in weight   Eyes: no visual changes   ENT: no nasal congestion or sore throat   Respiratory: no cough or shortness of breath   Cardiovascular: no chest pain or palpitations   Gastrointestinal: no nausea or vomiting   Genitourinary: no hematuria or dysuria   Integument/Breast: no rash or pruritis   Hematologic/Lymphatic: no easy bruising or lymphadenopathy   Musculoskeletal: no arthralgias or myalgias.   Neurological: no seizures or tremors   Behavioral/Psych: no auditory or visual hallucinations   Endocrine: no heat or cold intolerance       Objective:     Weight: 89.8 kg (198 lb)  Body mass index is 30.11 kg/m².  Vital Signs (Most Recent):  Temp: 98.4 °F (36.9 °C) (03/27/19 1220)  Pulse: 106 (03/27/19 1536)  Resp: 17 (03/27/19 1220)  BP: (!) 185/90 (03/27/19 1220)  SpO2: 96 % (03/27/19 1220) Vital Signs (24h Range):  Temp:  [96.1 °F (35.6 °C)-98.4 °F (36.9 °C)] 98.4 °F (36.9 °C)  Pulse:  [] 106  Resp:  [16-20] 17  SpO2:  [93 %-98 %] 96 %  BP: (135-195)/() 185/90                          Urethral Catheter 03/19/19 1815 16 Fr. (Active)   Site Assessment Clean;Intact 3/27/2019 12:20 PM   Collection Container Standard drainage bag 3/27/2019 12:20 PM   Securement Method secured to top of thigh w/ adhesive device 3/27/2019 12:20 PM   Catheter Care Performed yes 3/27/2019 12:20 PM   Reason for Continuing Urinary Catheterization Urinary retention 3/27/2019 12:20 PM   CAUTI Prevention Bundle StatLock in place w 1" slack;Drainage bag off the " floor;Green sheeting clip in use;Intact seal between catheter & drainage tubing;Drainage bag not overfilled (<2/3 full);Drainage bag below bladder;No dependent loops or kinks 3/27/2019 12:20 PM   Output (mL) 1900 mL 3/23/2019  5:00 AM       Neurosurgery Physical Exam    General: well developed, well nourished, no distress.   Head: normocephalic, atraumatic  Neurologic: Alert and oriented. Thought content appropriate.  GCS: Motor: 6/Verbal: 5/Eyes: 4 GCS Total: 15  Mental Status: Awake, Alert, Oriented x 4  Language: No aphasia  Speech: No dysarthria  Cranial nerves: face symmetric, tongue midline, CN II-XII grossly intact.   Eyes: pupils equal, round, reactive to light with accomodation, EOMI.  Pulmonary: normal respirations, no signs of respiratory distress  Abdomen: soft, non-distended, not tender to palpation  Sensory: intact to light touch throughout  Motor Strength: Moves all extremities spontaneously with good tone.  RUE/RLE full strength 5/5. LUE with residual weakness from prior strokes, 2/5 throughout. LLE with AKA. No abnormal movements seen.   Pronator Drift: no drift noted on right, unable to assess left  Finger-to-nose: Intact on right, unable to assess on left  Gordillo: + left  Clonus: absent  Babinski: absent  Pulses: 2+ and symmetric radial and dorsalis pedis.  Skin: Skin is warm, dry and intact.  No midline tenderness    Significant Labs:  Recent Labs   Lab 03/26/19  0601 03/27/19  0440   * 99    141   K 3.3* 3.6   * 109   CO2 24 21*   BUN 8* 8   CREATININE 1.00 1.1   CALCIUM 8.4 9.0   MG 1.7 1.2*     Recent Labs   Lab 03/26/19  0601 03/27/19  0440   WBC 6.00 7.81   HGB 11.5* 11.8*   HCT 35.6* 38.6*    390*     Recent Labs   Lab 03/27/19  0440   INR 1.0   APTT 26.2     Microbiology Results (last 7 days)     ** No results found for the last 168 hours. **        Recent Lab Results       03/27/19  1236   03/27/19  0546   03/27/19  0440   03/27/19  0224   03/27/19  0006         Immature Grans (Abs)     0.04  Comment:  Mild elevation in immature granulocytes is non specific and   can be seen in a variety of conditions including stress response,   acute inflammation, trauma and pregnancy. Correlation with other   laboratory and clinical findings is essential.           Albumin     2.2         Alkaline Phosphatase     73         ALT     10         Anion Gap     11         aPTT     26.2  Comment:  aPTT therapeutic range = 39-69 seconds         AST     17         Baso #     0.04         Basophil%     0.5         Total Bilirubin     0.4  Comment:  For infants and newborns, interpretation of results should be based  on gestational age, weight and in agreement with clinical  observations.  Premature Infant recommended reference ranges:  Up to 24 hours.............<8.0 mg/dL  Up to 48 hours............<12.0 mg/dL  3-5 days..................<15.0 mg/dL  6-29 days.................<15.0 mg/dL           BUN, Bld     8         Calcium     9.0         Chloride     109         CO2     21         Creatinine     1.1         Differential Method     Automated         eGFR if      >60.0         eGFR if non      >60.0  Comment:  Calculation used to obtain the estimated glomerular filtration  rate (eGFR) is the CKD-EPI equation.            Eos #     0.2         Eosinophil%     3.1         Estimated Avg Glucose       194       Glucose     99         Gran # (ANC)     5.5         Gran%     70.6         Hematocrit     38.6         Hemoglobin     11.8         Hemoglobin A1C External       8.4  Comment:  ADA Screening Guidelines:  5.7-6.4%  Consistent with prediabetes  >or=6.5%  Consistent with diabetes  High levels of fetal hemoglobin interfere with the HbA1C  assay. Heterozygous hemoglobin variants (HbS, HgC, etc)do  not significantly interfere with this assay.   However, presence of multiple variants may affect accuracy.         Immature Granulocytes     0.5         Coumadin  Monitoring INR     1.0  Comment:  Coumadin Therapy:  2.0 - 3.0 for INR for all indicators except mechanical heart valves  and antiphospholipid syndromes which should use 2.5 - 3.5.           Lymph #     1.3         Lymph%     17.0         Magnesium     1.2         MCH     26.3         MCHC     30.6         MCV     86         Mono #     0.7         Mono%     8.3         MPV     11.5         nRBC     0         Phosphorus     2.5         Platelets     390         POC Glucose               POCT Glucose 98 110     123     Potassium     3.6         Total Protein     7.0         Protime     10.8         RBC     4.49         RDW     16.9         Sodium     141         TSH     0.909         WBC     7.81                          03/26/19  2105        Immature Grans (Abs)       Albumin       Alkaline Phosphatase       ALT       Anion Gap       aPTT       AST       Baso #       Basophil%       Total Bilirubin       BUN, Bld       Calcium       Chloride       CO2       Creatinine       Differential Method       eGFR if        eGFR if non        Eos #       Eosinophil%       Estimated Avg Glucose       Glucose       Gran # (ANC)       Gran%       Hematocrit       Hemoglobin       Hemoglobin A1C External       Immature Granulocytes       Coumadin Monitoring INR       Lymph #       Lymph%       Magnesium       MCH       MCHC       MCV       Mono #       Mono%       MPV       nRBC       Phosphorus       Platelets       POC Glucose 128     POCT Glucose       Potassium       Total Protein       Protime       RBC       RDW       Sodium       TSH       WBC           All pertinent labs from the last 24 hours have been reviewed.    Significant Diagnostics:  All pertinent imaging reviewed.     Assessment/Plan:     * Brain mass  Patient is a 79 year old male with PMHx HTN, DM2, recurrent Afib and CVAs on Eliquis who presents as a [place] from [facility] [place]. Presented to [facility] [place] on 3/16 with suspected  seizure activity, currently on Keppra 750mg BID for ppx.     - Neurologically stable  - MRI brain significant for left frontal meningioma with mild mass effect and bony erosion and subacute to chronic right temporoparietal infarct.  - Will obtain MRI brain [facility] without fudicials for surgical planning.   - Plan for OR Tuesday 4/2 for left frontal craniotomy for tumor resection  - Will need documented surgical clearance from primary team.   - Continue Keppra 750mg BID.   - Dc Eliquis today in preparation for surgery  - Remainder of care per primary  - Will continue to follow. Please page with any questions or changes in exam.   - Discussed with Dr. Campoverde.               Thank you for your consult. I will follow-up with patient. Please contact us if you have any additional questions.    Savita Perez PA-C  Neurosurgery  [facility]

## 2019-03-27 NOTE — PLAN OF CARE
Problem: Adult Inpatient Plan of Care  Goal: Plan of Care Review  Outcome: Ongoing (interventions implemented as appropriate)     03/27/19 2893   Plan of Care Review   Plan of Care Reviewed With patient   Pt remains NPO, alert with some confusion.  Wound care consulted with new orders placed.  Pt complains of abdominal pain, distention noted.  Multiple loose stools this shift, incontinence care performed and linens changed.  Telemetry placed per order.  Oliveira cath intact and cath care performed.  F/C to gravity, urine yellow.  Seizure precautions maintained.  Will cont to monitor

## 2019-03-27 NOTE — ASSESSMENT & PLAN NOTE
Probably secondary to diabetic nephropathy and hypertensive nephrosclerosis. Uprot/creat = 5.37 indicative of nephrotic range proteinuria.   -optimize blood pressure medications if renal function allows

## 2019-03-27 NOTE — HPI
Mr. Bari Hogan is a 79 year old male with PMHx HTN, DM2, recurrent Afib and CVAs on Eliquis who presents [place] a [place] [facility] [place]. Presented to [facility] on 3/16 with suspected seizure activity. MRI brain at the time significant for left frontal meningioma with mild mass effect and bony erosion and subacute to chronic right temporoparietal infarct. EEG revealed seizure activity, patient loaded with Keppra and has been on 750mg BID since that time for ppx. Bone scan revealed lytic lesions in left frontal bone and L2 vertebrae. Underwent L2 biopsy and kyphoplasty by orthopedics at [facility] [place]. Results were significant for mild plasmacytosis and patchy mild marrow fibrosis. NSGY consulted for cranial biopsy for diagnosis and further treatment planning. Today patient denies headache, visual changes, weakness, numbness, paresthesias, neck pain, back pain. Residual left sided weakness from multiple strokes over the past 4 years. AKA of LLE. Denies hx of cancer, f/c.

## 2019-03-27 NOTE — ASSESSMENT & PLAN NOTE
Anemia workup was ordered showing iron deficiency anemia most likely secondary to malignancy and poor intake.     - continue iron supplementation     - add vit c 500mg TID

## 2019-03-27 NOTE — ASSESSMENT & PLAN NOTE
EEG revealing hemispheric slowing and frequent sharp waves consistent with seizure activity. No previous history of seizures reported. The new onset could be  acute infarct or secondary to frontal lobe mass     - continue Keppra  - seizure precautions

## 2019-03-27 NOTE — CONSULTS
A Wound  Consult was received from RN for Back DTI  The patient was admitted with AMS  and has a past medical history of HTN, CVA, DM2, right leg AKA.   Upon assessment, the left upper thoracic back has suspected MARSI after recent bx as reported by wife- the patient had a recent bx and dressing- when the dressing was removed a skin tear was observed. The partial thickness skin loss has pink/red wound bed with irregular wound edges and denuded skin to the faith-wound. Foam dressing applied to protect area and heal wound.   The Right  Lower lateral shin has 3 slough covered wounds- suspected VSU without erythema/drainage noted. The Upper Wound measures: 0.7cm Lx 1 cm W, the middle wound- 0.3cm in diameter, the distal wound 1.7 cmL x 1.5 cm W.  No wounds are noted on the calf area, skin is intact/pink.    The plan of care was discussed with the patient/wife and both verbalized understanding.   The Unit Nurse, Maryan,  was notified of the care provided and we discussed the treatment plan.   Nursing to perform care, wound care to follow prn    Consultant Recommendations:   1. Left upper thoracic back-MARSI- foam dressing weekly until resolved  2. Right shin- VSU- triad, telfa island daily.   3. HAPI bundle- waffle, heel protector, wedge       03/27/19 1030        Wound 03/26/19 1005 Medical adhesive related skin injury upper Thoracic spine   Date First Assessed/Time First Assessed: 03/26/19 1005   Pre-existing: No  Primary Wound Type: Medical adhesive related skin injury  Side: Left  Orientation: upper  Location: Thoracic spine   Wound Image    Wound WDL ex   Dressing Appearance Dry;Intact;Clean   Drainage Amount None   Appearance Pink;Moist   Tissue loss description Partial thickness   Red (%), Wound Tissue Color 100 %   Periwound Area Denuded;Pink   Wound Edges Open   Wound Length (cm) 5 cm   Wound Width (cm) 2.5 cm   Wound Depth (cm) 0.1 cm   Wound Volume (cm^3) 1.25 cm^3   Wound Surface Area (cm^2) 12.5 cm^2   Care  Cleansed with:;Sterile normal saline;Applied:;Skin Barrier   Dressing Changed;Applied;Foam   Dressing Change Due 04/03/19        Wound Venous Ulcer lateral Shin   No Date First Assessed or Time First Assessed found.   Primary Wound Type: Venous Ulcer  Side: Right  Orientation: lateral  Location: Shin   Wound Image      Dressing Appearance Open to air;No dressing   Drainage Amount None   Appearance Tan;Slough;Dry   Tissue loss description Not applicable   Yellow (%), Wound Tissue Color 100 %  (x 3 wounds)   Periwound Area Intact;Dry   Wound Edges Defined   Care Cleansed with:;Soap and water   Skin Interventions   Pressure Reduction Devices foam padding utilized;pressure-redistributing mattress utilized   Pressure Reduction Techniques frequent weight shift encouraged;positioned off wounds;weight shift assistance provided   Skin Protection adhesive use limited;silicone foam dressing in place;tubing/devices free from skin contact                03/27/19 1030        Wound 03/26/19 1005 Medical adhesive related skin injury upper Thoracic spine   Date First Assessed/Time First Assessed: 03/26/19 1005   Pre-existing: No  Primary Wound Type: Medical adhesive related skin injury  Side: Left  Orientation: upper  Location: Thoracic spine   Wound Image    Wound WDL ex   Dressing Appearance Dry;Intact;Clean   Drainage Amount None   Appearance Pink;Moist   Tissue loss description Partial thickness   Red (%), Wound Tissue Color 100 %   Periwound Area Denuded;Pink   Wound Edges Open   Wound Length (cm) 5 cm   Wound Width (cm) 2.5 cm   Wound Depth (cm) 0.1 cm   Wound Volume (cm^3) 1.25 cm^3   Wound Surface Area (cm^2) 12.5 cm^2   Care Cleansed with:;Sterile normal saline;Applied:;Skin Barrier   Dressing Changed;Applied;Foam   Dressing Change Due 04/03/19        Wound Venous Ulcer lateral Shin   No Date First Assessed or Time First Assessed found.   Primary Wound Type: Venous Ulcer  Side: Right  Orientation: lateral  Location: Shin    Wound Image      Dressing Appearance Open to air;No dressing   Drainage Amount None   Appearance Tan;Slough;Dry   Tissue loss description Not applicable   Yellow (%), Wound Tissue Color 100 %  (x 3 wounds)   Periwound Area Intact;Dry   Wound Edges Defined   Care Cleansed with:;Soap and water   Skin Interventions   Pressure Reduction Devices foam padding utilized;pressure-redistributing mattress utilized   Pressure Reduction Techniques frequent weight shift encouraged;positioned off wounds;weight shift assistance provided   Skin Protection adhesive use limited;silicone foam dressing in place;tubing/devices free from skin contact

## 2019-03-27 NOTE — PLAN OF CARE
EDGAR met with the Pt and his wife Saar Hogan in the Pt's room.  Both  and wife provided information for the Discharge Planning Assessment.

## 2019-03-28 ENCOUNTER — ANESTHESIA EVENT (OUTPATIENT)
Dept: MEDSURG UNIT | Facility: HOSPITAL | Age: 79
End: 2019-03-28

## 2019-03-28 LAB
ALBUMIN SERPL BCP-MCNC: 2.1 G/DL (ref 3.5–5.2)
ALP SERPL-CCNC: 79 U/L (ref 55–135)
ALT SERPL W/O P-5'-P-CCNC: 9 U/L (ref 10–44)
ANION GAP SERPL CALC-SCNC: 13 MMOL/L (ref 8–16)
ANISOCYTOSIS BLD QL SMEAR: SLIGHT
APTT BLDCRRT: 40.8 SEC (ref 21–32)
APTT BLDCRRT: 42 SEC (ref 21–32)
AST SERPL-CCNC: 14 U/L (ref 10–40)
BASOPHILS # BLD AUTO: 0.03 K/UL (ref 0–0.2)
BASOPHILS NFR BLD: 0.3 % (ref 0–1.9)
BILIRUB SERPL-MCNC: 0.3 MG/DL (ref 0.1–1)
BUN SERPL-MCNC: 11 MG/DL (ref 8–23)
BURR CELLS BLD QL SMEAR: ABNORMAL
CALCIUM SERPL-MCNC: 8.9 MG/DL (ref 8.7–10.5)
CHLORIDE SERPL-SCNC: 109 MMOL/L (ref 95–110)
CO2 SERPL-SCNC: 18 MMOL/L (ref 23–29)
CREAT SERPL-MCNC: 1.1 MG/DL (ref 0.5–1.4)
DIFFERENTIAL METHOD: ABNORMAL
EOSINOPHIL # BLD AUTO: 0.1 K/UL (ref 0–0.5)
EOSINOPHIL NFR BLD: 1.6 % (ref 0–8)
ERYTHROCYTE [DISTWIDTH] IN BLOOD BY AUTOMATED COUNT: 17 % (ref 11.5–14.5)
EST. GFR  (AFRICAN AMERICAN): >60 ML/MIN/1.73 M^2
EST. GFR  (NON AFRICAN AMERICAN): >60 ML/MIN/1.73 M^2
GLUCOSE SERPL-MCNC: 95 MG/DL (ref 70–110)
HCT VFR BLD AUTO: 38 % (ref 40–54)
HGB BLD-MCNC: 11.6 G/DL (ref 14–18)
HYPOCHROMIA BLD QL SMEAR: ABNORMAL
IMM GRANULOCYTES # BLD AUTO: 0.04 K/UL (ref 0–0.04)
IMM GRANULOCYTES NFR BLD AUTO: 0.4 % (ref 0–0.5)
LYMPHOCYTES # BLD AUTO: 1.2 K/UL (ref 1–4.8)
LYMPHOCYTES NFR BLD: 12.8 % (ref 18–48)
MAGNESIUM SERPL-MCNC: 1.3 MG/DL (ref 1.6–2.6)
MCH RBC QN AUTO: 26.2 PG (ref 27–31)
MCHC RBC AUTO-ENTMCNC: 30.5 G/DL (ref 32–36)
MCV RBC AUTO: 86 FL (ref 82–98)
MONOCYTES # BLD AUTO: 0.9 K/UL (ref 0.3–1)
MONOCYTES NFR BLD: 10 % (ref 4–15)
NEUTROPHILS # BLD AUTO: 6.8 K/UL (ref 1.8–7.7)
NEUTROPHILS NFR BLD: 74.9 % (ref 38–73)
NRBC BLD-RTO: 0 /100 WBC
PHOSPHATE SERPL-MCNC: 2.9 MG/DL (ref 2.7–4.5)
PLATELET # BLD AUTO: 405 K/UL (ref 150–350)
PLATELET BLD QL SMEAR: ABNORMAL
PMV BLD AUTO: 11 FL (ref 9.2–12.9)
POCT GLUCOSE: 104 MG/DL (ref 70–110)
POCT GLUCOSE: 96 MG/DL (ref 70–110)
POIKILOCYTOSIS BLD QL SMEAR: SLIGHT
POLYCHROMASIA BLD QL SMEAR: ABNORMAL
POTASSIUM SERPL-SCNC: 3.1 MMOL/L (ref 3.5–5.1)
PROT SERPL-MCNC: 6.7 G/DL (ref 6–8.4)
RBC # BLD AUTO: 4.42 M/UL (ref 4.6–6.2)
SODIUM SERPL-SCNC: 140 MMOL/L (ref 136–145)
WBC # BLD AUTO: 9.01 K/UL (ref 3.9–12.7)

## 2019-03-28 PROCEDURE — 11000001 HC ACUTE MED/SURG PRIVATE ROOM

## 2019-03-28 PROCEDURE — 80053 COMPREHEN METABOLIC PANEL: CPT

## 2019-03-28 PROCEDURE — 85025 COMPLETE CBC W/AUTO DIFF WBC: CPT

## 2019-03-28 PROCEDURE — 83735 ASSAY OF MAGNESIUM: CPT

## 2019-03-28 PROCEDURE — 99232 SBSQ HOSP IP/OBS MODERATE 35: CPT | Mod: ,,, | Performed by: PHYSICIAN ASSISTANT

## 2019-03-28 PROCEDURE — 99232 PR SUBSEQUENT HOSPITAL CARE,LEVL II: ICD-10-PCS | Mod: ,,, | Performed by: PHYSICIAN ASSISTANT

## 2019-03-28 PROCEDURE — 84100 ASSAY OF PHOSPHORUS: CPT

## 2019-03-28 PROCEDURE — 25000003 PHARM REV CODE 250: Performed by: STUDENT IN AN ORGANIZED HEALTH CARE EDUCATION/TRAINING PROGRAM

## 2019-03-28 PROCEDURE — 99233 SBSQ HOSP IP/OBS HIGH 50: CPT | Mod: GC,,, | Performed by: HOSPITALIST

## 2019-03-28 PROCEDURE — 25000003 PHARM REV CODE 250: Performed by: HOSPITALIST

## 2019-03-28 PROCEDURE — 63600175 PHARM REV CODE 636 W HCPCS: Performed by: STUDENT IN AN ORGANIZED HEALTH CARE EDUCATION/TRAINING PROGRAM

## 2019-03-28 PROCEDURE — 85730 THROMBOPLASTIN TIME PARTIAL: CPT | Mod: 91

## 2019-03-28 PROCEDURE — 36415 COLL VENOUS BLD VENIPUNCTURE: CPT

## 2019-03-28 PROCEDURE — 99233 PR SUBSEQUENT HOSPITAL CARE,LEVL III: ICD-10-PCS | Mod: GC,,, | Performed by: HOSPITALIST

## 2019-03-28 RX ORDER — TALC
1 POWDER (GRAM) TOPICAL EVERY 4 HOURS
Status: DISCONTINUED | OUTPATIENT
Start: 2019-03-28 | End: 2019-03-28

## 2019-03-28 RX ORDER — LANOLIN ALCOHOL/MO/W.PET/CERES
400 CREAM (GRAM) TOPICAL EVERY 4 HOURS
Status: COMPLETED | OUTPATIENT
Start: 2019-03-28 | End: 2019-03-28

## 2019-03-28 RX ORDER — LOPERAMIDE HYDROCHLORIDE 2 MG/1
2 CAPSULE ORAL 4 TIMES DAILY PRN
Status: DISCONTINUED | OUTPATIENT
Start: 2019-03-28 | End: 2019-03-28

## 2019-03-28 RX ORDER — ACETAMINOPHEN 325 MG/1
650 TABLET ORAL EVERY 6 HOURS PRN
Status: DISCONTINUED | OUTPATIENT
Start: 2019-03-28 | End: 2019-03-31

## 2019-03-28 RX ORDER — DICYCLOMINE HYDROCHLORIDE 10 MG/1
10 CAPSULE ORAL ONCE AS NEEDED
Status: COMPLETED | OUTPATIENT
Start: 2019-03-28 | End: 2019-03-29

## 2019-03-28 RX ORDER — SYRING-NEEDL,DISP,INSUL,0.3 ML 29 G X1/2"
296 SYRINGE, EMPTY DISPOSABLE MISCELLANEOUS ONCE
Status: DISCONTINUED | OUTPATIENT
Start: 2019-03-28 | End: 2019-03-28

## 2019-03-28 RX ORDER — POTASSIUM CHLORIDE 20 MEQ/1
40 TABLET, EXTENDED RELEASE ORAL ONCE
Status: COMPLETED | OUTPATIENT
Start: 2019-03-28 | End: 2019-03-28

## 2019-03-28 RX ADMIN — ATORVASTATIN CALCIUM 40 MG: 20 TABLET, FILM COATED ORAL at 08:03

## 2019-03-28 RX ADMIN — AMLODIPINE BESYLATE 10 MG: 10 TABLET ORAL at 08:03

## 2019-03-28 RX ADMIN — MAGNESIUM OXIDE TAB 400 MG (241.3 MG ELEMENTAL MG) 400 MG: 400 (241.3 MG) TAB at 05:03

## 2019-03-28 RX ADMIN — DOCUSATE SODIUM 100 MG: 100 CAPSULE, LIQUID FILLED ORAL at 08:03

## 2019-03-28 RX ADMIN — OXYCODONE HYDROCHLORIDE AND ACETAMINOPHEN 500 MG: 500 TABLET ORAL at 08:03

## 2019-03-28 RX ADMIN — LEVETIRACETAM 750 MG: 750 TABLET, FILM COATED ORAL at 08:03

## 2019-03-28 RX ADMIN — OXYCODONE HYDROCHLORIDE AND ACETAMINOPHEN 500 MG: 500 TABLET ORAL at 09:03

## 2019-03-28 RX ADMIN — POTASSIUM CHLORIDE 40 MEQ: 1500 TABLET, EXTENDED RELEASE ORAL at 08:03

## 2019-03-28 RX ADMIN — LEVETIRACETAM 750 MG: 750 TABLET, FILM COATED ORAL at 09:03

## 2019-03-28 RX ADMIN — LOPERAMIDE HYDROCHLORIDE 2 MG: 2 CAPSULE ORAL at 01:03

## 2019-03-28 RX ADMIN — HEPARIN SODIUM AND DEXTROSE 12 UNITS/KG/HR: 10000; 5 INJECTION INTRAVENOUS at 03:03

## 2019-03-28 RX ADMIN — FERROUS GLUCONATE TAB 324 MG (37.5 MG ELEMENTAL IRON) 324 MG: 324 (37.5 FE) TAB at 08:03

## 2019-03-28 RX ADMIN — MAGNESIUM OXIDE TAB 400 MG (241.3 MG ELEMENTAL MG) 400 MG: 400 (241.3 MG) TAB at 02:03

## 2019-03-28 RX ADMIN — METOPROLOL SUCCINATE 12.5 MG: 25 TABLET, EXTENDED RELEASE ORAL at 08:03

## 2019-03-28 RX ADMIN — OXYCODONE HYDROCHLORIDE AND ACETAMINOPHEN 500 MG: 500 TABLET ORAL at 02:03

## 2019-03-28 NOTE — PROGRESS NOTES
[facility] Hospital Medicine  Progress Note    Patient Name: Bari Hogan  MRN: [id number]  Patient Class: IP- Inpatient   Admission Date: 3/26/2019  Length of Stay: 2 days  Attending Physician: Jasmin Feliciano MD  Primary Care Provider: [facility] Medicine Team: [facility] HOSP MED 3 Justino Jameson MD    Subjective:     Principal Problem:Brain mass    HPI:  Mr Hogan is a 79 y.o M w/ a history significant for hypertension, CVA, and type 2 diabetes mellitus presented to [facility] w/ altered mental status on 03/16/2019.  An MRI of the brain was done which was significant for a left frontal meningeal mass with mild local mass effect and adjacent osseous erosion. The differential includes dural metastasis and atypical meningioma.  There was also a subacute to chronic right temporoparietal infarct.  An EEG was done which was consistent with seizure activity and the patient was loaded with Keppra and this has continued for prophylaxis.  Bone scan revealed lytic lesions in the left frontal bone and vertebral column.  The patient had biopsy of the L2 spine and kyphoplasty.  The results were significant for mild plasmacytosis and patchy mild marrow fibrosis.     Patient is now being transferred to [facility] for a cranial biopsy for appropriate diagnosis and subsequent treatment plan.        Hospital Course:  3/27: MRI performed, continue heparin drip, continue keppra      Interval History: no acute issues overnight    Review of Systems  Objective:     Vital Signs (Most Recent):  Temp: 97 °F (36.1 °C) (03/28/19 1222)  Pulse: 101 (03/28/19 1222)  Resp: 18 (03/28/19 1222)  BP: 118/78 (03/28/19 1222)  SpO2: 95 % (03/28/19 1222) Vital Signs (24h Range):  Temp:  [97 °F (36.1 °C)-98.9 °F (37.2 °C)] 97 °F (36.1 °C)  Pulse:  [] 101  Resp:  [16-20] 18  SpO2:  [94 %-98 %] 95 %  BP: (114-152)/(62-85) 118/78     Weight: 89.7 kg (197 lb 12 oz)  Body mass index is 30.07  kg/m².    Intake/Output Summary (Last 24 hours) at 3/28/2019 1346  Last data filed at 3/27/2019 1800  Gross per 24 hour   Intake --   Output 550 ml   Net -550 ml      Physical Exam   Constitutional: He is oriented to person, place, and time. He appears well-developed and well-nourished.   HENT:   Head: Normocephalic and atraumatic.   Eyes: Pupils are equal, round, and reactive to light. EOM are normal.   Neck: Normal range of motion. Neck supple.   Cardiovascular: Normal rate and regular rhythm.   Pulmonary/Chest: Effort normal and breath sounds normal.   Abdominal: Soft. Bowel sounds are normal.   Musculoskeletal: Normal range of motion. He exhibits deformity.   Increased tone/spasticity of the LUE throughout (chronic, dt old stroke)       Neurological: He is alert and oriented to person, place, and time. He displays abnormal reflex. A cranial nerve deficit is present. He exhibits abnormal muscle tone. Coordination abnormal.   left hemiparesis; normal strength of the RUE/LE     loss of nasolabial fold on the left (likely chronic due to old stroke)       Significant Labs:   Recent Lab Results       03/28/19  0831   03/28/19  0331   03/28/19  0225   03/27/19  2352   03/27/19  1630        Immature Grans (Abs)   0.04  Comment:  Mild elevation in immature granulocytes is non specific and   can be seen in a variety of conditions including stress response,   acute inflammation, trauma and pregnancy. Correlation with other   laboratory and clinical findings is essential.             Albumin   2.1           Alkaline Phosphatase   79           ALT   9           Anion Gap   13           Aniso   Slight           aPTT 42.0  Comment:  aPTT therapeutic range = 39-69 seconds   40.8  Comment:  aPTT therapeutic range = 39-69 seconds         AST   14           Baso #   0.03           Basophil%   0.3           Total Bilirubin   0.3  Comment:  For infants and newborns, interpretation of results should be based  on gestational age,  weight and in agreement with clinical  observations.  Premature Infant recommended reference ranges:  Up to 24 hours.............<8.0 mg/dL  Up to 48 hours............<12.0 mg/dL  3-5 days..................<15.0 mg/dL  6-29 days.................<15.0 mg/dL             BUN, Bld   11           Enriqueta Cells   Occasional           Calcium   8.9           Chloride   109           CO2   18           Creatinine   1.1           Differential Method   Automated           eGFR if    >60.0           eGFR if non    >60.0  Comment:  Calculation used to obtain the estimated glomerular filtration  rate (eGFR) is the CKD-EPI equation.              Eos #   0.1           Eosinophil%   1.6           Glucose   95           Gran # (ANC)   6.8           Gran%   74.9           Hematocrit   38.0           Hemoglobin   11.6           Hypo   Occasional           Immature Granulocytes   0.4           Lymph #   1.2           Lymph%   12.8           Magnesium   1.3           MCH   26.2           MCHC   30.5           MCV   86           Mono #   0.9           Mono%   10.0           MPV   11.0           nRBC   0           Phosphorus   2.9           Platelet Estimate   Increased           Platelets   405           POCT Glucose       116 106     Poik   Slight           Poly   Occasional           Potassium   3.1           Total Protein   6.7           RBC   4.42           RDW   17.0           Sodium   140           WBC   9.01                              All pertinent labs within the past 24 hours have been reviewed.    Significant Imaging: I have reviewed all pertinent imaging results/findings within the past 24 hours.    Assessment/Plan:      * Brain mass  MRI of the brain with contrast revealed:  1. 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  Differential includes dural metastasis an atypical meningioma.  2. 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    Left frontal  meningeal mass has been present on imaging since July 2018.     ?? Whether the left frontal meningeal mass with bony erosion is something other than the vertebral lesion @ L2. The L2 biopsy results are as follows:   - Mild plasmacytosis.   - Patchy mild marrow fibrosis.   - Focal crush artifact.   - Results of outside consultative review and final diagnosis to follow  in an addendum report.      - NPO while pending speech eval  - neurosurgery consulted, will have OR on Tuesday. Will stop heparin drip 24 hours before surgery.      Peripheral vascular disease of lower extremity        Alteration in skin integrity  Wound care consulted      Seizures  EEG revealing hemispheric slowing and frequent sharp waves consistent with seizure activity. No previous history of seizures reported. The new onset could be  acute infarct or secondary to frontal lobe mass     - continue Keppra  - seizure precautions           Nephrotic range proteinuria  Probably secondary to diabetic nephropathy and hypertensive nephrosclerosis. Uprot/creat = 5.37 indicative of nephrotic range proteinuria.   -optimize blood pressure medications if renal function allows           Metastatic carcinoma to bone  As noted on CT abd pelvis.  Source of these metastases is unclear.  Cannot rule out the possibility of CNS metastasis as well. Bone scan showing left frontal meningeal mass and lytic lesion on L2 consistent metastasis. There was also no lesions/mass seen in the chest which could indicate possible primary source in the  tract or thyroid. Thyroid ultrasound with no evidence of malignancy   - s/p L2 lesion pathology findings showing a 5-10% representation of plasma cells. This could be secondary to an abnormal reaction to something or the beginnings of multiple myeloma. The small percentage doesn't qualify for MM but it could be a result of such diseases including lymphoma or  waldenstom's macroglobulinemia. Patient can also have isolated  plasmacytoma in the brain. If such is diagnosed then the treatment may require localized radiation.     - Patient will need biopsy of brain mass to confirm.   - urine kappa/lambda 1.61            Type 2 diabetes mellitus with diabetic peripheral angiopathy without gangrene, without long-term current use of insulin  - holding oral medications    - low dose sliding scale for NPO patient     Normocytic anemia  Anemia workup was ordered showing iron deficiency anemia most likely secondary to malignancy and poor intake.     - continue iron supplementation     - add vit c 500mg TID         Cerebrovascular accident (CVA) due to embolism of right middle cerebral artery  Patient with known previous CVA with deficits. CT head showing  Acute/subacute right temporoparietal infarct with mild local mass effect and small amount of faith-infarct hemorrhage posteriorly.    - seizure precautions   w keprra  - patient not on AC currently; reviewed neurology notes from OSH- who recommended restarting AC for afib; will defer final decision to primary team            Atrial fibrillation  Cannot rule out the possibility of embolic event      - repeat EKG  - continue metoprolol 12.5mg for rate control   - CHADSvasc 7, continue heparin drip, OR on Tuesday             VTE Risk Mitigation (From admission, onward)        Ordered     heparin 25,000 units in dextrose 5% (100 units/ml) IV bolus from bag INITIAL BOLUS  Once      03/27/19 1600     heparin 25,000 units in dextrose 5% 250 mL (100 units/mL) infusion LOW INTENSITY nomogram - [facility]  Continuous      03/27/19 1600     heparin 25,000 units in dextrose 5% (100 units/ml) IV bolus from bag - ADDITIONAL PRN BOLUS - 30 units/kg  As needed (PRN)      03/27/19 1600     heparin 25,000 units in dextrose 5% (100 units/ml) IV bolus from bag - ADDITIONAL PRN BOLUS - 60 units/kg  As needed (PRN)      03/27/19 1600     Place TARUN hose  Until discontinued      03/27/19 0053     Place sequential compression  device  Until discontinued      03/27/19 0053     IP VTE HIGH RISK PATIENT  Once      03/27/19 0053              Justino Jameson MD  Department of Hospital Medicine   [facility]

## 2019-03-28 NOTE — PROGRESS NOTES
[facility]  Neurosurgery  Progress Note    Subjective:     History of Present Illness: Mr. Bari Hogan is a 79 year old male with PMHx HTN, DM2, recurrent Afib and CVAs on Eliquis [place] [facility] [place]. [place] [facility] [place] with suspected seizure activity. MRI brain at the time significant for left frontal meningioma with mild mass effect and bony erosion and subacute to chronic right temporoparietal infarct. EEG revealed seizure activity, patient loaded with Keppra and has been on 750mg BID since that time for ppx. Bone scan revealed lytic lesions in left frontal bone and L2 vertebrae. Underwent L2 biopsy and kyphoplasty by [place]opedics [place] [facility] Results were significant for mild plasmacytosis and patchy mild marrow fibrosis. NSGY consulted for cranial biopsy for diagnosis and further treatment planning. Today patient denies headache, visual changes, weakness, numbness, paresthesias, neck pain, back pain. Residual left sided weakness from multiple strokes over the past 4 years. AKA of LLE. Denies hx of cancer, f/c.     Post-Op Info:  Procedure(s) (LRB):  BIOPSY left frontal intracranial lesion Stealth without fudicials (Left)         Interval History:   NAEON. Patient resting comfortably in bed. No family at bedside. Denies headache, dizziness, N/V, vision changes, seizure like activity, or any new weakness.     Medications:  Continuous Infusions:   heparin (porcine) in D5W 12 Units/kg/hr (03/27/19 2018)     Scheduled Meds:   amLODIPine  10 mg Oral Daily    ascorbic acid (vitamin C)  500 mg Oral TID    atorvastatin  40 mg Oral Daily    docusate sodium  100 mg Oral Daily    ferrous gluconate  324 mg Oral Daily with breakfast    heparin (PORCINE)  60 Units/kg (Adjusted) Intravenous Once    levETIRAcetam  750 mg Oral BID    metoprolol succinate  12.5 mg Oral Daily     PRN Meds:dextrose 50%, dextrose 50%, glucagon (human recombinant), glucose, glucose,  "heparin (PORCINE), heparin (PORCINE), insulin aspart U-100, loperamide, sodium chloride 0.9%     Review of Systems  Objective:     Weight: 89.7 kg (197 lb 12 oz)  Body mass index is 30.07 kg/m².  Vital Signs (Most Recent):  Temp: 97.9 °F (36.6 °C) (03/28/19 0724)  Pulse: 96 (03/28/19 0724)  Resp: 16 (03/28/19 0724)  BP: 114/62 (03/28/19 0724)  SpO2: 97 % (03/28/19 0724) Vital Signs (24h Range):  Temp:  [97.4 °F (36.3 °C)-98.9 °F (37.2 °C)] 97.9 °F (36.6 °C)  Pulse:  [] 96  Resp:  [16-20] 16  SpO2:  [94 %-98 %] 97 %  BP: (114-185)/(62-90) 114/62                          Urethral Catheter 03/19/19 1815 16 Fr. (Active)   Site Assessment Clean;Intact 3/27/2019  8:00 PM   Collection Container Standard drainage bag 3/27/2019  8:00 PM   Securement Method secured to top of thigh w/ adhesive device 3/27/2019  8:00 PM   Catheter Care Performed yes 3/27/2019  8:00 PM   Reason for Continuing Urinary Catheterization Urinary retention 3/27/2019  8:00 PM   CAUTI Prevention Bundle StatLock in place w 1" slack 3/27/2019  8:00 PM   Output (mL) 1900 mL 3/23/2019  5:00 AM       Neurosurgery Physical Exam  General: well developed, well nourished, no distress.   Head: normocephalic, atraumatic  Neurologic: Alert and oriented. Thought content appropriate.  GCS: Motor: 6/Verbal: 5/Eyes: 4 GCS Total: 15  Mental Status: Awake, Alert, Oriented x 4  Language: No aphasia  Speech: No dysarthria  Cranial nerves: face symmetric, tongue midline, CN II-XII grossly intact.   Eyes: Left pupil round, Right pupil irregular. Both are reactive to light, EOMI.  Pulmonary: normal respirations, no signs of respiratory distress  Abdomen: soft, non-distended, not tender to palpation  Sensory: intact to light touch throughout  Motor Strength: Moves all extremities spontaneously with good tone.  RUE/RLE 5/5. LUE with residual weakness from prior strokes, 4-/5 throughout except  3/5. LLE HF 4+/5. Left AKA.   Pronator Drift: no drift noted on right, " unable to assess left  Finger-to-nose: Intact on right, unable to assess on left  Gordillo: + left  Clonus: absent  Babinski: absent  Skin: Skin is warm, dry and intact.        Significant Labs:  Recent Labs   Lab 03/27/19 0440 03/28/19  0331   GLU 99 95    140   K 3.6 3.1*    109   CO2 21* 18*   BUN 8 11   CREATININE 1.1 1.1   CALCIUM 9.0 8.9   MG 1.2* 1.3*     Recent Labs   Lab 03/27/19 0440 03/28/19  0331   WBC 7.81 9.01   HGB 11.8* 11.6*   HCT 38.6* 38.0*   * 405*     Recent Labs   Lab 03/27/19 0440 03/28/19  0225 03/28/19  0831   INR 1.0  --   --    APTT 26.2 40.8* 42.0*     Microbiology Results (last 7 days)     ** No results found for the last 168 hours. **          Significant Diagnostics:  MRI brain stealth:  I independently reviewed the imaging.     1. Limited pre and postcontrast only MRI brain for intraoperative navigation purposes.  Previously identified extra-axial lesion along the left frontal convexity with associated left parafalcine component appears relatively unchanged in overall size and configuration as detailed above.  Please see dictated MRI reports of 03/17/2019 and 03/18/2019 for additional details.    2. Abnormal region of right temporoparietal encephalomalacia with associated cortical postcontrast enhancement suggestive of subacute infarct.    Assessment/Plan:     * Brain mass  79 year old male with a PMHx HTN, DM2, recurrent Afib and CVAs, on Eliquis, who presents as a [place] from [facility] [place]. Presented to [facility] on 3/16 with suspected seizure activity, currently on Keppra 750mg BID for ppx.     - Patient neurologically stable on exam  - MRI stealth completed.   - Plan for OR Tuesday 4/2 for left frontal craniotomy for tumor resection  - Will appreciate risk stratification from primary team   - Continue Keppra 750 mg BID for seizure prevention  - Afib/CVA: Continue to hold Eliquis in preparation for surgery. Patient currently on hep gtt. Will need to stop  24 hours prior to surgery.   - Remainder of care per primary  - Will continue to follow. Please page with any questions or changes in exam.       Please call with any questions      Lili Kellogg PA-C   Neurosurgery   Pager: [phone]

## 2019-03-28 NOTE — ASSESSMENT & PLAN NOTE
79 year old male with a PMHx HTN, DM2, recurrent Afib and CVAs, on Eliquis, who [place] [facility] [place]. [place] [facility] [place] 3/16 with suspected seizure activity, currently on Keppra 750mg BID for ppx.     - Patient neurologically stable on exam  - MRI stealth completed.   - Plan for OR Tuesday 4/2 for left frontal craniotomy for tumor resection  - Will appreciate risk stratification from primary team   - Continue Keppra 750 mg BID for seizure prevention  - Afib/CVA: Continue to hold Eliquis in preparation for surgery. Patient currently on hep gtt. Will need to stop 24 hours prior to surgery.   - Remainder of care per primary  - Will continue to follow. Please page with any questions or changes in exam.

## 2019-03-28 NOTE — ASSESSMENT & PLAN NOTE
Patient with known previous CVA with deficits. CT head showing  Acute/subacute right temporoparietal infarct with mild local mass effect and small amount of faith-infarct hemorrhage posteriorly.    - seizure precautions  w keprra  - patient not on AC currently; reviewed neurology notes from OSH- who recommended restarting AC for afib; will defer final decision to primary team

## 2019-03-28 NOTE — NURSING
Med team paged per pt complaint of diarrhea. Stool is watery in consistency and pt has had 3 bm's since start of shift. Discussed with team. Immodium to be ordered per physician. No new complaints or concerns at this time. Nadn; will monitor.

## 2019-03-28 NOTE — PLAN OF CARE
Problem: Adult Inpatient Plan of Care  Goal: Plan of Care Review  Outcome: Ongoing (interventions implemented as appropriate)  Plan of care reviewed. Pt  Denies questions or concerns. No direct needs voiced. Bed in lowest position. Call bell within reach. Side rails up x2. Nadn. Will report off to oncoming shift.

## 2019-03-28 NOTE — SUBJECTIVE & OBJECTIVE
"Interval History:   NAEON. Patient resting comfortably in bed. No family at bedside. Denies headache, dizziness, N/V, vision changes, seizure like activity, or any new weakness.     Medications:  Continuous Infusions:   heparin (porcine) in D5W 12 Units/kg/hr (03/27/19 2018)     Scheduled Meds:   amLODIPine  10 mg Oral Daily    ascorbic acid (vitamin C)  500 mg Oral TID    atorvastatin  40 mg Oral Daily    docusate sodium  100 mg Oral Daily    ferrous gluconate  324 mg Oral Daily with breakfast    heparin (PORCINE)  60 Units/kg (Adjusted) Intravenous Once    levETIRAcetam  750 mg Oral BID    metoprolol succinate  12.5 mg Oral Daily     PRN Meds:dextrose 50%, dextrose 50%, glucagon (human recombinant), glucose, glucose, heparin (PORCINE), heparin (PORCINE), insulin aspart U-100, loperamide, sodium chloride 0.9%     Review of Systems  Objective:     Weight: 89.7 kg (197 lb 12 oz)  Body mass index is 30.07 kg/m².  Vital Signs (Most Recent):  Temp: 97.9 °F (36.6 °C) (03/28/19 0724)  Pulse: 96 (03/28/19 0724)  Resp: 16 (03/28/19 0724)  BP: 114/62 (03/28/19 0724)  SpO2: 97 % (03/28/19 0724) Vital Signs (24h Range):  Temp:  [97.4 °F (36.3 °C)-98.9 °F (37.2 °C)] 97.9 °F (36.6 °C)  Pulse:  [] 96  Resp:  [16-20] 16  SpO2:  [94 %-98 %] 97 %  BP: (114-185)/(62-90) 114/62                          Urethral Catheter 03/19/19 1815 16 Fr. (Active)   Site Assessment Clean;Intact 3/27/2019  8:00 PM   Collection Container Standard drainage bag 3/27/2019  8:00 PM   Securement Method secured to top of thigh w/ adhesive device 3/27/2019  8:00 PM   Catheter Care Performed yes 3/27/2019  8:00 PM   Reason for Continuing Urinary Catheterization Urinary retention 3/27/2019  8:00 PM   CAUTI Prevention Bundle StatLock in place w 1" slack 3/27/2019  8:00 PM   Output (mL) 1900 mL 3/23/2019  5:00 AM       Neurosurgery Physical Exam  General: well developed, well nourished, no distress.   Head: normocephalic, atraumatic  Neurologic: " Alert and oriented. Thought content appropriate.  GCS: Motor: 6/Verbal: 5/Eyes: 4 GCS Total: 15  Mental Status: Awake, Alert, Oriented x 4  Language: No aphasia  Speech: No dysarthria  Cranial nerves: face symmetric, tongue midline, CN II-XII grossly intact.   Eyes: pupils equal, round, reactive to light with accomodation, EOMI.  Pulmonary: normal respirations, no signs of respiratory distress  Abdomen: soft, non-distended, not tender to palpation  Sensory: intact to light touch throughout  Motor Strength: Moves all extremities spontaneously with good tone.  RUE/RLE 5/5. LUE with residual weakness from prior strokes, 4-/5 throughout except  3/5. LLE HF 4+/5. Left AKA.   Pronator Drift: no drift noted on right, unable to assess left  Finger-to-nose: Intact on right, unable to assess on left  Gordillo: + left  Clonus: absent  Babinski: absent  Skin: Skin is warm, dry and intact.        Significant Labs:  Recent Labs   Lab 03/27/19 0440 03/28/19  0331   GLU 99 95    140   K 3.6 3.1*    109   CO2 21* 18*   BUN 8 11   CREATININE 1.1 1.1   CALCIUM 9.0 8.9   MG 1.2* 1.3*     Recent Labs   Lab 03/27/19 0440 03/28/19  0331   WBC 7.81 9.01   HGB 11.8* 11.6*   HCT 38.6* 38.0*   * 405*     Recent Labs   Lab 03/27/19  0440 03/28/19  0225 03/28/19  0831   INR 1.0  --   --    APTT 26.2 40.8* 42.0*     Microbiology Results (last 7 days)     ** No results found for the last 168 hours. **          Significant Diagnostics:  MRI brain stealth:  I independently reviewed the imaging.     1. Limited pre and postcontrast only MRI brain for intraoperative navigation purposes.  Previously identified extra-axial lesion along the left frontal convexity with associated left parafalcine component appears relatively unchanged in overall size and configuration as detailed above.  Please see dictated MRI reports of 03/17/2019 and 03/18/2019 for additional details.    2. Abnormal region of right temporoparietal  encephalomalacia with associated cortical postcontrast enhancement suggestive of subacute infarct.

## 2019-03-28 NOTE — ASSESSMENT & PLAN NOTE
Cannot rule out the possibility of embolic event      - repeat EKG  - continue metoprolol 12.5mg for rate control   - CHADSvasc 7, continue heparin drip, OR on Tuesday

## 2019-03-28 NOTE — SUBJECTIVE & OBJECTIVE
Interval History: no acute issues overnight    Review of Systems  Objective:     Vital Signs (Most Recent):  Temp: 97 °F (36.1 °C) (03/28/19 1222)  Pulse: 101 (03/28/19 1222)  Resp: 18 (03/28/19 1222)  BP: 118/78 (03/28/19 1222)  SpO2: 95 % (03/28/19 1222) Vital Signs (24h Range):  Temp:  [97 °F (36.1 °C)-98.9 °F (37.2 °C)] 97 °F (36.1 °C)  Pulse:  [] 101  Resp:  [16-20] 18  SpO2:  [94 %-98 %] 95 %  BP: (114-152)/(62-85) 118/78     Weight: 89.7 kg (197 lb 12 oz)  Body mass index is 30.07 kg/m².    Intake/Output Summary (Last 24 hours) at 3/28/2019 1346  Last data filed at 3/27/2019 1800  Gross per 24 hour   Intake --   Output 550 ml   Net -550 ml      Physical Exam   Constitutional: He is oriented to person, place, and time. He appears well-developed and well-nourished.   HENT:   Head: Normocephalic and atraumatic.   Eyes: Pupils are equal, round, and reactive to light. EOM are normal.   Neck: Normal range of motion. Neck supple.   Cardiovascular: Normal rate and regular rhythm.   Pulmonary/Chest: Effort normal and breath sounds normal.   Abdominal: Soft. Bowel sounds are normal.   Musculoskeletal: Normal range of motion. He exhibits deformity.   Increased tone/spasticity of the LUE throughout (chronic, dt old stroke)       Neurological: He is alert and oriented to person, place, and time. He displays abnormal reflex. A cranial nerve deficit is present. He exhibits abnormal muscle tone. Coordination abnormal.   left hemiparesis; normal strength of the RUE/LE     loss of nasolabial fold on the left (likely chronic due to old stroke)       Significant Labs:   Recent Lab Results       03/28/19  0831   03/28/19  0331   03/28/19  0225   03/27/19  2352   03/27/19  1630        Immature Grans (Abs)   0.04  Comment:  Mild elevation in immature granulocytes is non specific and   can be seen in a variety of conditions including stress response,   acute inflammation, trauma and pregnancy. Correlation with other    laboratory and clinical findings is essential.             Albumin   2.1           Alkaline Phosphatase   79           ALT   9           Anion Gap   13           Aniso   Slight           aPTT 42.0  Comment:  aPTT therapeutic range = 39-69 seconds   40.8  Comment:  aPTT therapeutic range = 39-69 seconds         AST   14           Baso #   0.03           Basophil%   0.3           Total Bilirubin   0.3  Comment:  For infants and newborns, interpretation of results should be based  on gestational age, weight and in agreement with clinical  observations.  Premature Infant recommended reference ranges:  Up to 24 hours.............<8.0 mg/dL  Up to 48 hours............<12.0 mg/dL  3-5 days..................<15.0 mg/dL  6-29 days.................<15.0 mg/dL             BUN, Bld   11           Aurora Cells   Occasional           Calcium   8.9           Chloride   109           CO2   18           Creatinine   1.1           Differential Method   Automated           eGFR if    >60.0           eGFR if non    >60.0  Comment:  Calculation used to obtain the estimated glomerular filtration  rate (eGFR) is the CKD-EPI equation.              Eos #   0.1           Eosinophil%   1.6           Glucose   95           Gran # (ANC)   6.8           Gran%   74.9           Hematocrit   38.0           Hemoglobin   11.6           Hypo   Occasional           Immature Granulocytes   0.4           Lymph #   1.2           Lymph%   12.8           Magnesium   1.3           MCH   26.2           MCHC   30.5           MCV   86           Mono #   0.9           Mono%   10.0           MPV   11.0           nRBC   0           Phosphorus   2.9           Platelet Estimate   Increased           Platelets   405           POCT Glucose       116 106     Poik   Slight           Poly   Occasional           Potassium   3.1           Total Protein   6.7           RBC   4.42           RDW   17.0           Sodium   140           WBC    9.01                              All pertinent labs within the past 24 hours have been reviewed.    Significant Imaging: I have reviewed all pertinent imaging results/findings within the past 24 hours.

## 2019-03-28 NOTE — ASSESSMENT & PLAN NOTE
MRI of the brain with contrast revealed:  1. 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  Differential includes dural metastasis an atypical meningioma.  2. 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    Left frontal meningeal mass has been present on imaging since July 2018.     ?? Whether the left frontal meningeal mass with bony erosion is something other than the vertebral lesion @ L2. The L2 biopsy results are as follows:   - Mild plasmacytosis.   - Patchy mild marrow fibrosis.   - Focal crush artifact.   - Results of outside consultative review and final diagnosis to follow  in an addendum report.      - NPO while pending speech eval  - neurosurgery consulted, will have OR on Tuesday. Will stop heparin drip 24 hours before surgery.

## 2019-03-28 NOTE — HOSPITAL COURSE
3/27: MRI performed, continue heparin drip, continue keppra  3/28: continue heparin drip, pt reported some back stool last night, but morning h/h even rised, will continue to watch,   3/29: BM still dark, h/h stable, likely due to iron supplement causing it, continue heparin drip, got bolus for subtherapeutic aPTT  3/30: No acute events. Patient awaits brain biopsy per neurosurgery. pt had some colon distention on KUB; mild abdominal pain resolved w tylenol, no nausea or vomiting; NGT attempted and pt refused and could not tolerate  3/31: NGT attempted again w urojet and pt refused and could not tolerate  4/1:   Pt denies abdominal pain, passed BM about 2 nights ago, passed flatus yesterday, NPO, IVF, heparin drip stopped 24hrs prior to surgery tmr; later in the afternoon, pt refused surgery, heparin drip resumed at night  4/2:  Pt initially still declining surgery in the morning, after meeting with family, he changed his mind and agreeable to surgery, NSG notified.  4/3: after discussion with NSG, pt still refuse surgery,agreed to have him follow up in clinic w NSG and monitor tumor growth and behavior with serial MRIs and make a decision at a later time. Nichol is on board as well, He is agreement with no surgery and follow up as outpatient. He also wish to be DNR. Apixaban was resumed and ok per NSG. He will be discharged in stable condition.

## 2019-03-29 LAB
ALBUMIN SERPL BCP-MCNC: 2.3 G/DL (ref 3.5–5.2)
ALP SERPL-CCNC: 76 U/L (ref 55–135)
ALT SERPL W/O P-5'-P-CCNC: 9 U/L (ref 10–44)
ANION GAP SERPL CALC-SCNC: 15 MMOL/L (ref 8–16)
APTT BLDCRRT: 28.6 SEC (ref 21–32)
APTT BLDCRRT: 39.6 SEC (ref 21–32)
AST SERPL-CCNC: 13 U/L (ref 10–40)
BASOPHILS # BLD AUTO: 0.02 K/UL (ref 0–0.2)
BASOPHILS NFR BLD: 0.5 % (ref 0–1.9)
BILIRUB SERPL-MCNC: 0.3 MG/DL (ref 0.1–1)
BUN SERPL-MCNC: 14 MG/DL (ref 8–23)
CALCIUM SERPL-MCNC: 8.9 MG/DL (ref 8.7–10.5)
CHLORIDE SERPL-SCNC: 110 MMOL/L (ref 95–110)
CO2 SERPL-SCNC: 16 MMOL/L (ref 23–29)
CREAT SERPL-MCNC: 1.2 MG/DL (ref 0.5–1.4)
DIFFERENTIAL METHOD: ABNORMAL
EOSINOPHIL # BLD AUTO: 0.1 K/UL (ref 0–0.5)
EOSINOPHIL NFR BLD: 2.8 % (ref 0–8)
ERYTHROCYTE [DISTWIDTH] IN BLOOD BY AUTOMATED COUNT: 17.2 % (ref 11.5–14.5)
EST. GFR  (AFRICAN AMERICAN): >60 ML/MIN/1.73 M^2
EST. GFR  (NON AFRICAN AMERICAN): 57.6 ML/MIN/1.73 M^2
GLUCOSE SERPL-MCNC: 84 MG/DL (ref 70–110)
HCT VFR BLD AUTO: 41.4 % (ref 40–54)
HGB BLD-MCNC: 12.5 G/DL (ref 14–18)
IMM GRANULOCYTES # BLD AUTO: 0.01 K/UL (ref 0–0.04)
IMM GRANULOCYTES NFR BLD AUTO: 0.2 % (ref 0–0.5)
LYMPHOCYTES # BLD AUTO: 1.4 K/UL (ref 1–4.8)
LYMPHOCYTES NFR BLD: 33.4 % (ref 18–48)
MAGNESIUM SERPL-MCNC: 1.6 MG/DL (ref 1.6–2.6)
MCH RBC QN AUTO: 26.2 PG (ref 27–31)
MCHC RBC AUTO-ENTMCNC: 30.2 G/DL (ref 32–36)
MCV RBC AUTO: 87 FL (ref 82–98)
MONOCYTES # BLD AUTO: 0.6 K/UL (ref 0.3–1)
MONOCYTES NFR BLD: 14.3 % (ref 4–15)
NEUTROPHILS # BLD AUTO: 2.1 K/UL (ref 1.8–7.7)
NEUTROPHILS NFR BLD: 48.8 % (ref 38–73)
NRBC BLD-RTO: 0 /100 WBC
PHOSPHATE SERPL-MCNC: 3.3 MG/DL (ref 2.7–4.5)
PLATELET # BLD AUTO: 437 K/UL (ref 150–350)
PMV BLD AUTO: 11.2 FL (ref 9.2–12.9)
POCT GLUCOSE: 103 MG/DL (ref 70–110)
POCT GLUCOSE: 151 MG/DL (ref 70–110)
POCT GLUCOSE: 94 MG/DL (ref 70–110)
POCT GLUCOSE: 98 MG/DL (ref 70–110)
POTASSIUM SERPL-SCNC: 3.1 MMOL/L (ref 3.5–5.1)
PROT SERPL-MCNC: 7 G/DL (ref 6–8.4)
RBC # BLD AUTO: 4.78 M/UL (ref 4.6–6.2)
SODIUM SERPL-SCNC: 141 MMOL/L (ref 136–145)
WBC # BLD AUTO: 4.28 K/UL (ref 3.9–12.7)

## 2019-03-29 PROCEDURE — 80053 COMPREHEN METABOLIC PANEL: CPT

## 2019-03-29 PROCEDURE — 85730 THROMBOPLASTIN TIME PARTIAL: CPT | Mod: 91

## 2019-03-29 PROCEDURE — 99231 PR SUBSEQUENT HOSPITAL CARE,LEVL I: ICD-10-PCS | Mod: ,,, | Performed by: PHYSICIAN ASSISTANT

## 2019-03-29 PROCEDURE — 85730 THROMBOPLASTIN TIME PARTIAL: CPT

## 2019-03-29 PROCEDURE — 25000003 PHARM REV CODE 250: Performed by: STUDENT IN AN ORGANIZED HEALTH CARE EDUCATION/TRAINING PROGRAM

## 2019-03-29 PROCEDURE — 25000003 PHARM REV CODE 250: Performed by: INTERNAL MEDICINE

## 2019-03-29 PROCEDURE — 63600175 PHARM REV CODE 636 W HCPCS: Performed by: STUDENT IN AN ORGANIZED HEALTH CARE EDUCATION/TRAINING PROGRAM

## 2019-03-29 PROCEDURE — 92610 EVALUATE SWALLOWING FUNCTION: CPT

## 2019-03-29 PROCEDURE — 99233 SBSQ HOSP IP/OBS HIGH 50: CPT | Mod: GC,,, | Performed by: INTERNAL MEDICINE

## 2019-03-29 PROCEDURE — 99233 PR SUBSEQUENT HOSPITAL CARE,LEVL III: ICD-10-PCS | Mod: GC,,, | Performed by: INTERNAL MEDICINE

## 2019-03-29 PROCEDURE — 84100 ASSAY OF PHOSPHORUS: CPT

## 2019-03-29 PROCEDURE — 11000001 HC ACUTE MED/SURG PRIVATE ROOM

## 2019-03-29 PROCEDURE — 36415 COLL VENOUS BLD VENIPUNCTURE: CPT

## 2019-03-29 PROCEDURE — 99231 SBSQ HOSP IP/OBS SF/LOW 25: CPT | Mod: ,,, | Performed by: PHYSICIAN ASSISTANT

## 2019-03-29 PROCEDURE — 85025 COMPLETE CBC W/AUTO DIFF WBC: CPT

## 2019-03-29 PROCEDURE — 83735 ASSAY OF MAGNESIUM: CPT

## 2019-03-29 RX ORDER — POTASSIUM CHLORIDE 20 MEQ/1
40 TABLET, EXTENDED RELEASE ORAL ONCE
Status: COMPLETED | OUTPATIENT
Start: 2019-03-29 | End: 2019-03-29

## 2019-03-29 RX ORDER — SENNOSIDES 8.6 MG/1
8.6 TABLET ORAL DAILY
Status: DISCONTINUED | OUTPATIENT
Start: 2019-03-29 | End: 2019-03-30

## 2019-03-29 RX ORDER — POLYETHYLENE GLYCOL 3350 17 G/17G
17 POWDER, FOR SOLUTION ORAL DAILY
Status: DISCONTINUED | OUTPATIENT
Start: 2019-03-30 | End: 2019-03-30

## 2019-03-29 RX ADMIN — SENNOSIDES 8.6 MG: 8.6 TABLET, FILM COATED ORAL at 12:03

## 2019-03-29 RX ADMIN — OXYCODONE HYDROCHLORIDE AND ACETAMINOPHEN 500 MG: 500 TABLET ORAL at 09:03

## 2019-03-29 RX ADMIN — ATORVASTATIN CALCIUM 40 MG: 20 TABLET, FILM COATED ORAL at 09:03

## 2019-03-29 RX ADMIN — OXYCODONE HYDROCHLORIDE AND ACETAMINOPHEN 500 MG: 500 TABLET ORAL at 10:03

## 2019-03-29 RX ADMIN — DICYCLOMINE HYDROCHLORIDE 10 MG: 10 CAPSULE ORAL at 02:03

## 2019-03-29 RX ADMIN — AMLODIPINE BESYLATE 10 MG: 10 TABLET ORAL at 09:03

## 2019-03-29 RX ADMIN — LEVETIRACETAM 750 MG: 750 TABLET, FILM COATED ORAL at 09:03

## 2019-03-29 RX ADMIN — ACETAMINOPHEN 650 MG: 325 TABLET ORAL at 04:03

## 2019-03-29 RX ADMIN — HEPARIN SODIUM AND DEXTROSE 15 UNITS/KG/HR: 10000; 5 INJECTION INTRAVENOUS at 04:03

## 2019-03-29 RX ADMIN — LEVETIRACETAM 750 MG: 750 TABLET, FILM COATED ORAL at 10:03

## 2019-03-29 RX ADMIN — FERROUS GLUCONATE TAB 324 MG (37.5 MG ELEMENTAL IRON) 324 MG: 324 (37.5 FE) TAB at 09:03

## 2019-03-29 RX ADMIN — OXYCODONE HYDROCHLORIDE AND ACETAMINOPHEN 500 MG: 500 TABLET ORAL at 04:03

## 2019-03-29 RX ADMIN — DOCUSATE SODIUM 100 MG: 100 CAPSULE, LIQUID FILLED ORAL at 09:03

## 2019-03-29 RX ADMIN — POTASSIUM CHLORIDE 40 MEQ: 1500 TABLET, EXTENDED RELEASE ORAL at 04:03

## 2019-03-29 RX ADMIN — METOPROLOL SUCCINATE 12.5 MG: 25 TABLET, EXTENDED RELEASE ORAL at 09:03

## 2019-03-29 NOTE — PLAN OF CARE
Plan is [facility]       03/29/19 4061   Discharge Reassessment   Assessment Type Discharge Planning Reassessment   Do you have any problems affording any of your prescribed medications? No   Discharge Plan A Home Health   Discharge Plan B Rehab   Can the patient answer the patient profile reliably? Yes, cognitively intact   How does the patient rate their overall health at the present time? Fair   Describe the patient's ability to walk at the present time. Does not walk or unable to take any steps at all   How often would a person be available to care for the patient? Whenever needed   Number of comorbid conditions (as recorded on the chart) Four   During the past month, has the patient often been bothered by feeling down, depressed or hopeless? No   During the past month, has the patient often been bothered by little interest or pleasure in doing things? No

## 2019-03-29 NOTE — PLAN OF CARE
Problem: SLP Goal  Goal: SLP Goal  Speech Language Pathology Goals  Goals expected to be met by 4/5/19:    1.  Pt will tolerate Mechanical Soft diet w/ thin liquids w/ adequate oral clearance and no overt signs of aspiration.  2.  Pt will complete Speech Language Cognitive evaluation to determine the need for additional goals.      Outcome: Ongoing (interventions implemented as appropriate)  Clinical Swallow Evaluation completed.  REC:  Pt cont w/ Mechanical Soft diet w/ thin liquids, oral meds whole 1-2 at a time, aspiration precautions.  Cont ST per POC.    Jennifer Messer CCC-SLP  3/29/2019

## 2019-03-29 NOTE — SUBJECTIVE & OBJECTIVE
Interval History: no acute events overnight, no new complaints    Review of Systems  Objective:     Vital Signs (Most Recent):  Temp: 96.3 °F (35.7 °C) (03/29/19 0743)  Pulse: 94 (03/29/19 0743)  Resp: 16 (03/29/19 0743)  BP: (!) 147/72 (03/29/19 0743)  SpO2: 98 % (03/29/19 0743) Vital Signs (24h Range):  Temp:  [96.3 °F (35.7 °C)-98 °F (36.7 °C)] 96.3 °F (35.7 °C)  Pulse:  [] 94  Resp:  [14-20] 16  SpO2:  [93 %-98 %] 98 %  BP: (111-147)/(59-83) 147/72     Weight: 89.7 kg (197 lb 12 oz)  Body mass index is 30.07 kg/m².    Intake/Output Summary (Last 24 hours) at 3/29/2019 0843  Last data filed at 3/29/2019 0300  Gross per 24 hour   Intake 300 ml   Output 1600 ml   Net -1300 ml      Physical Exam   Constitutional: He is oriented to person, place, and time. He appears well-developed and well-nourished.   HENT:   Head: Normocephalic and atraumatic.   Eyes: Pupils are equal, round, and reactive to light. EOM are normal.   Neck: Normal range of motion. Neck supple.   Cardiovascular: Normal rate and regular rhythm.   Pulmonary/Chest: Effort normal and breath sounds normal.   Abdominal: Soft. Bowel sounds are normal.   Musculoskeletal: Normal range of motion. He exhibits deformity.   Increased tone/spasticity of the LUE throughout (chronic, dt old stroke)       Neurological: He is alert and oriented to person, place, and time. He displays abnormal reflex. A cranial nerve deficit is present. He exhibits abnormal muscle tone. Coordination abnormal.   left hemiparesis; normal strength of the RUE/LE     loss of nasolabial fold on the left (likely chronic due to old stroke)       Significant Labs:   Recent Lab Results       03/29/19  0559   03/29/19  0347   03/29/19  0103   03/28/19  1719   03/28/19  1405        Immature Grans (Abs)   0.01  Comment:  Mild elevation in immature granulocytes is non specific and   can be seen in a variety of conditions including stress response,   acute inflammation, trauma and pregnancy.  Correlation with other   laboratory and clinical findings is essential.             Albumin   2.3           Alkaline Phosphatase   76           ALT   9           Anion Gap   15           aPTT   39.6  Comment:  aPTT therapeutic range = 39-69 seconds           AST   13           Baso #   0.02           Basophil%   0.5           Total Bilirubin   0.3  Comment:  For infants and newborns, interpretation of results should be based  on gestational age, weight and in agreement with clinical  observations.  Premature Infant recommended reference ranges:  Up to 24 hours.............<8.0 mg/dL  Up to 48 hours............<12.0 mg/dL  3-5 days..................<15.0 mg/dL  6-29 days.................<15.0 mg/dL             BUN, Bld   14           Calcium   8.9           Chloride   110           CO2   16           Creatinine   1.2           Differential Method   Automated           eGFR if    >60.0           eGFR if non    57.6  Comment:  Calculation used to obtain the estimated glomerular filtration  rate (eGFR) is the CKD-EPI equation.              Eos #   0.1           Eosinophil%   2.8           Glucose   84           Gran # (ANC)   2.1           Gran%   48.8           Hematocrit   41.4           Hemoglobin   12.5           Immature Granulocytes   0.2           Lymph #   1.4           Lymph%   33.4           Magnesium   1.6           MCH   26.2           MCHC   30.2           MCV   87           Mono #   0.6           Mono%   14.3           MPV   11.2           nRBC   0           Phosphorus   3.3           Platelets   437           POCT Glucose 94   98 104 96     Potassium   3.1           Total Protein   7.0           RBC   4.78           RDW   17.2           Sodium   141           WBC   4.28                              All pertinent labs within the past 24 hours have been reviewed.    Significant Imaging: I have reviewed all pertinent imaging results/findings within the past 24 hours.

## 2019-03-29 NOTE — PT/OT/SLP EVAL
Speech Language Pathology Evaluation  Bedside Swallow    Patient Name:  Bari Hogan   MRN:  [id number]  Admitting Diagnosis: Brain mass    Recommendations:                 General Recommendations:  Dysphagia therapy and Cognitive-linguistic evaluation  Diet recommendations:  Mechanical soft, Thin   Aspiration Precautions: Meds whole 1 at a time, Monitor for s/s of aspiration and Standard aspiration precautions   General Precautions: Standard, aspiration, fall  Communication strategies:  provide increased time to answer and go to room if call light pushed    History:     Past Medical History:   Diagnosis Date    Arthritis     Chronic constipation     Diabetes mellitus     Hypertension     Stroke     x2       Past Surgical History:   Procedure Laterality Date    Kyphoplasty-L2 and biopsy N/A 3/20/2019    Performed by Sarthak Son MD at [facility] OR       Social History: Patient lives alone.  His spouse lives in the same apartment building down the [address].    Prior Intubation HX:  None this admission    Modified Barium Swallow: none this admission    Chest X-Rays: 3/22/19:  No acute infiltrates or signs of CHF.  Prominent cardiac silhouette, similar to prior studies    Prior diet: soft per pt report    Occupation/hobbies/homemaking: Pt is retired from working on the [employer].    Subjective     Pt was awake and alert in NAD.  He was agreeable to evaluation  Patient goals: none expressed     Pain/Comfort:  · Pain Rating 1: 0/10  · Pain Rating Post-Intervention 1: 0/10    Objective:     Oral Musculature Evaluation  · Oral Musculature: WFL  · Dentition: scattered dentition  · Secretion Management: adequate  · Mucosal Quality: dry  · Mandibular Strength and Mobility: WFL  · Oral Labial Strength and Mobility: WFL  · Lingual Strength and Mobility: WFL  · Buccal Strength and Mobility: WFL  · Volitional Cough: adequate  · Volitional Swallow: present  · Voice Prior to PO Intake: mildly hoarseness    Bedside Swallow Eval:    Consistencies Assessed:  · Thin liquids tsp, cup and straw sips  · Puree tsp bites  · Solids self fed bite     Oral Phase:   · Prolonged mastication likely due to pt missing upper dentition    Pharyngeal Phase:   · WFL  · no overt clinical signs/symptoms of aspiration  · no overt clinical signs/symptoms of pharyngeal dysphagia    Compensatory Strategies  · None    Treatment: Education was provided to pt re: SLP role, eval results, diet recs, aspiration precautions and SLP POC.  He indicated good understanding.      Assessment:     Bari Hogan is a 79 y.o. male with an SLP diagnosis of Dysphagia.  He presents with mild oral dysphagia at this time d/t limited dentition.    Goals:   Multidisciplinary Problems     SLP Goals        Problem: SLP Goal    Goal Priority Disciplines Outcome   SLP Goal     SLP Ongoing (interventions implemented as appropriate)   Description:  Speech Language Pathology Goals  Goals expected to be met by 4/5/19:    1.  Pt will tolerate Mechanical Soft diet w/ thin liquids w/ adequate oral clearance and no overt signs of aspiration.  2.  Pt will complete Speech Language Cognitive evaluation to determine the need for additional goals.                        Plan:     · Patient to be seen:  4 x/week   · Plan of Care expires:  04/27/19  · Plan of Care reviewed with:  patient   · SLP Follow-Up:  Yes       Discharge recommendations:  nursing facility, skilled   Barriers to Discharge:  Inaccessible Home Environment    Time Tracking:     SLP Treatment Date:   03/29/19  Speech Start Time:  0942  Speech Stop Time:  0956     Speech Total Time (min):  14 min    Billable Minutes: Eval Swallow and Oral Function 14    Jennifer Messer CCC-SLP  03/29/2019

## 2019-03-29 NOTE — ASSESSMENT & PLAN NOTE
MRI of the brain with contrast revealed:  1. 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  Differential includes dural metastasis an atypical meningioma.  2. 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    Left frontal meningeal mass has been present on imaging since July 2018.     ?? Whether the left frontal meningeal mass with bony erosion is something other than the vertebral lesion @ L2. The L2 biopsy results are as follows:   - Mild plasmacytosis.   - Patchy mild marrow fibrosis.   - Focal crush artifact.   - Results of outside consultative review and final diagnosis to follow  in an addendum report.      - Mechanical soft diet, NPO after MN on Monday  - neurosurgery consulted, will have OR on Tuesday. Will stop heparin drip 24 hours before surgery.

## 2019-03-29 NOTE — NURSING
Patient medicated per order for co abd cramping. Patient had small black colored liquid stool, Patient voided 500 cc clear yellow urine post alejandro catheter removal.

## 2019-03-29 NOTE — PHARMACY MED REC
"Admission Medication Reconciliation - Pharmacy Consult Note    The home medication history was taken by Erika Nixon, Pharmacy Technician.  Based on information gathered and subsequent review by the clinical pharmacist, the items below may need attention.     You may go to "Admission" then "Reconcile Home Medications" tabs to review and/or act upon these items.     Potentially problematic discrepancies with current MAR  o Patient IS taking the following which was not ordered upon admit  o Duo-Neb 3 ml Q 8 hr PRN   o Cyproheptadine 4 mg TID   o Ergocalciferol 50 000 U Q 7 days   o Empagliflozin 25 mg QD (held)   o Allopurinol 200 mg QD   o Patient is taking a drug DIFFERENTLY than how ordered upon admit  o Metoprolol succinate 25 mg QD     Please address this information as you see fit.  Feel free to contact us if you have any questions or require assistance.  Ani Zhao  EXT [id number]       .    .            "

## 2019-03-29 NOTE — PROGRESS NOTES
[facility] Hospital Medicine  Progress Note    Patient Name: Bari Hogan  MRN: [id number]  Patient Class: IP- Inpatient   Admission Date: 3/26/2019  Length of Stay: 3 days  Attending Physician: Geraldine Rubio MD  Primary Care Provider: [facility] Medicine Team: [facility] [place] [facility] HOSP MED 3 Justino Jameson MD    Subjective:     Principal Problem:Brain mass    HPI:  Mr Hogan is a 79 y.o M w/ a history significant for hypertension, CVA, and type 2 diabetes mellitus presented to [facility] w/ altered mental status on 03/16/2019.  An MRI of the brain was done which was significant for a left frontal meningeal mass with mild local mass effect and adjacent osseous erosion. The differential includes dural metastasis and atypical meningioma.  There was also a subacute to chronic right temporoparietal infarct.  An EEG was done which was consistent with seizure activity and the patient was loaded with Keppra and this has continued for prophylaxis.  Bone scan revealed lytic lesions in the left frontal bone and vertebral column.  The patient had biopsy of the L2 spine and kyphoplasty.  The results were significant for mild plasmacytosis and patchy mild marrow fibrosis.     Patient is now being transferred to [facility] for a cranial biopsy for appropriate diagnosis and subsequent treatment plan.        Hospital Course:  3/27: MRI performed, continue heparin drip, continue keppra  3/28: continue heparin drip, pt reported some back stool last night, but morning h/h even rised, will continue to watch,     Interval History: no acute events overnight, no new complaints    Review of Systems  Objective:     Vital Signs (Most Recent):  Temp: 96.3 °F (35.7 °C) (03/29/19 0743)  Pulse: 94 (03/29/19 0743)  Resp: 16 (03/29/19 0743)  BP: (!) 147/72 (03/29/19 0743)  SpO2: 98 % (03/29/19 0743) Vital Signs (24h Range):  Temp:  [96.3 °F (35.7 °C)-98 °F (36.7 °C)] 96.3 °F (35.7 °C)  Pulse:  [] 94  Resp:   [14-20] 16  SpO2:  [93 %-98 %] 98 %  BP: (111-147)/(59-83) 147/72     Weight: 89.7 kg (197 lb 12 oz)  Body mass index is 30.07 kg/m².    Intake/Output Summary (Last 24 hours) at 3/29/2019 0843  Last data filed at 3/29/2019 0300  Gross per 24 hour   Intake 300 ml   Output 1600 ml   Net -1300 ml      Physical Exam   Constitutional: He is oriented to person, place, and time. He appears well-developed and well-nourished.   HENT:   Head: Normocephalic and atraumatic.   Eyes: Pupils are equal, round, and reactive to light. EOM are normal.   Neck: Normal range of motion. Neck supple.   Cardiovascular: Normal rate and regular rhythm.   Pulmonary/Chest: Effort normal and breath sounds normal.   Abdominal: Soft. Bowel sounds are normal.   Musculoskeletal: Normal range of motion. He exhibits deformity.   Increased tone/spasticity of the LUE throughout (chronic, dt old stroke)       Neurological: He is alert and oriented to person, place, and time. He displays abnormal reflex. A cranial nerve deficit is present. He exhibits abnormal muscle tone. Coordination abnormal.   left hemiparesis; normal strength of the RUE/LE     loss of nasolabial fold on the left (likely chronic due to old stroke)       Significant Labs:   Recent Lab Results       03/29/19  0559   03/29/19  0347   03/29/19  0103   03/28/19  1719   03/28/19  1405        Immature Grans (Abs)   0.01  Comment:  Mild elevation in immature granulocytes is non specific and   can be seen in a variety of conditions including stress response,   acute inflammation, trauma and pregnancy. Correlation with other   laboratory and clinical findings is essential.             Albumin   2.3           Alkaline Phosphatase   76           ALT   9           Anion Gap   15           aPTT   39.6  Comment:  aPTT therapeutic range = 39-69 seconds           AST   13           Baso #   0.02           Basophil%   0.5           Total Bilirubin   0.3  Comment:  For infants and newborns,  interpretation of results should be based  on gestational age, weight and in agreement with clinical  observations.  Premature Infant recommended reference ranges:  Up to 24 hours.............<8.0 mg/dL  Up to 48 hours............<12.0 mg/dL  3-5 days..................<15.0 mg/dL  6-29 days.................<15.0 mg/dL             BUN, Bld   14           Calcium   8.9           Chloride   110           CO2   16           Creatinine   1.2           Differential Method   Automated           eGFR if    >60.0           eGFR if non    57.6  Comment:  Calculation used to obtain the estimated glomerular filtration  rate (eGFR) is the CKD-EPI equation.              Eos #   0.1           Eosinophil%   2.8           Glucose   84           Gran # (ANC)   2.1           Gran%   48.8           Hematocrit   41.4           Hemoglobin   12.5           Immature Granulocytes   0.2           Lymph #   1.4           Lymph%   33.4           Magnesium   1.6           MCH   26.2           MCHC   30.2           MCV   87           Mono #   0.6           Mono%   14.3           MPV   11.2           nRBC   0           Phosphorus   3.3           Platelets   437           POCT Glucose 94   98 104 96     Potassium   3.1           Total Protein   7.0           RBC   4.78           RDW   17.2           Sodium   141           WBC   4.28                              All pertinent labs within the past 24 hours have been reviewed.    Significant Imaging: I have reviewed all pertinent imaging results/findings within the past 24 hours.    Assessment/Plan:      * Brain mass  MRI of the brain with contrast revealed:  1. 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  Differential includes dural metastasis an atypical meningioma.  2. 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    Left frontal meningeal mass has been present on imaging since July 2018.     ?? Whether the left frontal  meningeal mass with bony erosion is something other than the vertebral lesion @ L2. The L2 biopsy results are as follows:   - Mild plasmacytosis.   - Patchy mild marrow fibrosis.   - Focal crush artifact.   - Results of outside consultative review and final diagnosis to follow  in an addendum report.      - Mechanical soft diet, NPO after MN on Monday  - neurosurgery consulted, will have OR on Tuesday. Will stop heparin drip 24 hours before surgery.    Revised Cardiac Risk Index   High risk surgery: No  History of ischemic heart disease: No  History of congestive heart failure: No  History of stroke: Yes  Insulin dependent diabetes: No  Cr > 2: No  1 points, class II risk, 6% risk of cardiac event    Patient is low-to-moderate risk for surgery, will continue beta-blockade and heparin drip for now, stop heparin drip 24hrs prior surgery. He is optimized for surgery.                 Peripheral vascular disease of lower extremity        Alteration in skin integrity  Wound care consulted      Seizures  EEG revealing hemispheric slowing and frequent sharp waves consistent with seizure activity. No previous history of seizures reported. The new onset could be  acute infarct or secondary to frontal lobe mass     - continue Keppra  - seizure precautions           Nephrotic range proteinuria  Probably secondary to diabetic nephropathy and hypertensive nephrosclerosis. Uprot/creat = 5.37 indicative of nephrotic range proteinuria.   -optimize blood pressure medications if renal function allows           Metastatic carcinoma to bone  As noted on CT abd pelvis.  Source of these metastases is unclear.  Cannot rule out the possibility of CNS metastasis as well. Bone scan showing left frontal meningeal mass and lytic lesion on L2 consistent metastasis. There was also no lesions/mass seen in the chest which could indicate possible primary source in the  tract or thyroid. Thyroid ultrasound with no evidence of malignancy   - s/p L2  lesion pathology findings showing a 5-10% representation of plasma cells. This could be secondary to an abnormal reaction to something or the beginnings of multiple myeloma. The small percentage doesn't qualify for MM but it could be a result of such diseases including lymphoma or  waldenstom's macroglobulinemia. Patient can also have isolated plasmacytoma in the brain. If such is diagnosed then the treatment may require localized radiation.     - Patient will need biopsy of brain mass to confirm.   - urine kappa/lambda 1.61            Type 2 diabetes mellitus with diabetic peripheral angiopathy without gangrene, without long-term current use of insulin  - holding oral medications    - low dose sliding scale , mechanical soft diet added today    Normocytic anemia  Anemia workup was ordered showing iron deficiency anemia most likely secondary to malignancy and poor intake.     - continue iron supplementation     - add vit c 500mg TID         Cerebrovascular accident (CVA) due to embolism of right middle cerebral artery  Patient with known previous CVA with deficits. CT head showing  Acute/subacute right temporoparietal infarct with mild local mass effect and small amount of faith-infarct hemorrhage posteriorly.    - seizure precautions  w keprra  - patient not on AC currently; reviewed neurology notes from OSH- who recommended restarting AC for afib; now on heparin drip while waiting on OR Tuesday          Atrial fibrillation  Cannot rule out the possibility of embolic event      - repeat EKG  - continue metoprolol 12.5mg for rate control   - CHADSvasc 7, continue heparin drip, OR on Tuesday             VTE Risk Mitigation (From admission, onward)        Ordered     heparin 25,000 units in dextrose 5% (100 units/ml) IV bolus from bag INITIAL BOLUS  Once      03/27/19 1600     heparin 25,000 units in dextrose 5% 250 mL (100 units/mL) infusion LOW INTENSITY nomogram - [facility]  Continuous      03/27/19 1600     heparin  25,000 units in dextrose 5% (100 units/ml) IV bolus from bag - ADDITIONAL PRN BOLUS - 30 units/kg  As needed (PRN)      03/27/19 1600     heparin 25,000 units in dextrose 5% (100 units/ml) IV bolus from bag - ADDITIONAL PRN BOLUS - 60 units/kg  As needed (PRN)      03/27/19 1600     Place TARUN hose  Until discontinued      03/27/19 0053     Place sequential compression device  Until discontinued      03/27/19 0053     IP VTE HIGH RISK PATIENT  Once      03/27/19 0053              Justino Jameson MD  Department of Hospital Medicine   [facility]

## 2019-03-29 NOTE — SUBJECTIVE & OBJECTIVE
Interval History:   NAEON. Patient resting comfortably in bed. No family at bedside. Denies headache, dizziness, N/V, vision changes, seizure like activity, or any new weakness.        Medications:  Continuous Infusions:   heparin (porcine) in D5W 12 Units/kg/hr (03/28/19 2000)     Scheduled Meds:   amLODIPine  10 mg Oral Daily    ascorbic acid (vitamin C)  500 mg Oral TID    atorvastatin  40 mg Oral Daily    ferrous gluconate  324 mg Oral Daily with breakfast    heparin (PORCINE)  60 Units/kg (Adjusted) Intravenous Once    levETIRAcetam  750 mg Oral BID    metoprolol succinate  12.5 mg Oral Daily    senna  8.6 mg Oral Daily     PRN Meds:acetaminophen, dextrose 50%, glucose, glucose, heparin (PORCINE), heparin (PORCINE), sodium chloride 0.9%     Review of Systems  Objective:     Weight: 89.7 kg (197 lb 12 oz)  Body mass index is 30.07 kg/m².  Vital Signs (Most Recent):  Temp: 96.3 °F (35.7 °C) (03/29/19 0743)  Pulse: 94 (03/29/19 0743)  Resp: 16 (03/29/19 0743)  BP: (!) 147/72 (03/29/19 0743)  SpO2: 98 % (03/29/19 0743) Vital Signs (24h Range):  Temp:  [96.3 °F (35.7 °C)-98 °F (36.7 °C)] 96.3 °F (35.7 °C)  Pulse:  [] 94  Resp:  [14-20] 16  SpO2:  [93 %-98 %] 98 %  BP: (111-147)/(59-83) 147/72       Neurosurgery Physical Exam  General: well developed, well nourished, no distress.   Head: normocephalic, atraumatic  Neurologic: Alert and oriented. Thought content appropriate.  GCS: Motor: 6/Verbal: 5/Eyes: 4 GCS Total: 15  Mental Status: Awake, Alert, Oriented x 4  Language: No aphasia  Speech: No dysarthria  Cranial nerves: face symmetric, tongue midline, CN II-XII grossly intact.   Eyes: Left pupil round, Right pupil irregular. Reactive to light, EOMI.  Pulmonary: normal respirations, no signs of respiratory distress  Abdomen: soft, non-distended, not tender to palpation  Sensory: intact to light touch throughout  Motor Strength: Moves all extremities spontaneously with good tone.  RUE/RLE 5/5. CJ  with residual weakness from prior strokes, 4-/5 throughout except  3/5. Increased tone and spasticity in LUE.  LLE HF 4+/5. Left AKA.   Pronator Drift: no drift noted on right, unable to assess left  Finger-to-nose: Intact on right, unable to assess on left  Gordillo: + left  Clonus: absent  Babinski: absent  Skin: Skin is warm, dry and intact.          Significant Labs:  Recent Labs   Lab 03/28/19 0331 03/29/19  0347   GLU 95 84    141   K 3.1* 3.1*    110   CO2 18* 16*   BUN 11 14   CREATININE 1.1 1.2   CALCIUM 8.9 8.9   MG 1.3* 1.6     Recent Labs   Lab 03/28/19  0331 03/29/19  0347   WBC 9.01 4.28   HGB 11.6* 12.5*   HCT 38.0* 41.4   * 437*     Recent Labs   Lab 03/28/19  0225 03/28/19  0831 03/29/19 0347   APTT 40.8* 42.0* 39.6*

## 2019-03-29 NOTE — NURSING
Dr Jameson notified urine output for 12 hrs 150cc cloudy urine in urinal pt denies incontinent of urine bladder scan check none on all lower abdomen abdomen distended and firm , no order made will continue to monitor

## 2019-03-29 NOTE — PLAN OF CARE
Problem: Adult Inpatient Plan of Care  Goal: Plan of Care Review  Patient had small liquid bm black in color and complained of abd cramping, med with Bentyl per order. Heparin continues infusing, pt has voided post alejandro removal.

## 2019-03-29 NOTE — ASSESSMENT & PLAN NOTE
79 year old male with a PMHx HTN, DM2, recurrent Afib and CVAs, on Eliquis, who presents as a transfer from [facility] for cranial biopsy. Presented to [facility] on 3/16 with suspected seizure activity, currently on Keppra 750mg BID for ppx.     - Patient neurologically stable on exam  - MRI stealth completed.   - Plan for OR Tuesday 4/2 for left frontal craniotomy for tumor resection  - Pending risk stratification from primary team   - Continue Keppra 750 mg BID for seizure prevention  - Afib/CVA: Continue to hold Eliquis in preparation for surgery. Patient currently on hep gtt. Will need to stop 24 hours prior to surgery.   - Remainder of care per primary  - Will continue to follow. Please page with any questions or changes in exam.

## 2019-03-29 NOTE — ASSESSMENT & PLAN NOTE
Patient with known previous CVA with deficits. CT head showing  Acute/subacute right temporoparietal infarct with mild local mass effect and small amount of faith-infarct hemorrhage posteriorly.    - seizure precautions  w jasmin  - patient not on AC currently; reviewed neurology notes from OSH- who recommended restarting AC for afib; now on heparin drip while waiting on OR Tuesday

## 2019-03-29 NOTE — PROGRESS NOTES
[facility]  Neurosurgery  Progress Note    Subjective:     History of Present Illness: Mr. Bari Hogan is a 79 year old male with PMHx HTN, DM2, recurrent Afib and CVAs on Eliquis who presents as a transfer from [facility] for cranial biopsy. Presented to [facility] on 3/16 with suspected seizure activity. MRI brain at the time significant for left frontal meningioma with mild mass effect and bony erosion and subacute to chronic right temporoparietal infarct. EEG revealed seizure activity, patient loaded with Keppra and has been on 750mg BID since that time for ppx. Bone scan revealed lytic lesions in left frontal bone and L2 vertebrae. Underwent L2 biopsy and kyphoplasty by orthopedics at [facility]. Results were significant for mild plasmacytosis and patchy mild marrow fibrosis. NSGY consulted for cranial biopsy for diagnosis and further treatment planning. Today patient denies headache, visual changes, weakness, numbness, paresthesias, neck pain, back pain. Residual left sided weakness from multiple strokes over the past 4 years. AKA of LLE. Denies hx of cancer, f/c.     Post-Op Info:  Procedure(s) (LRB):  BIOPSY left frontal intracranial lesion Stealth without fudicials (Left)         Interval History:   NAEON. Patient resting comfortably in bed. No family at bedside. Denies headache, dizziness, N/V, vision changes, seizure like activity, or any new weakness.        Medications:  Continuous Infusions:   heparin (porcine) in D5W 12 Units/kg/hr (03/28/19 2000)     Scheduled Meds:   amLODIPine  10 mg Oral Daily    ascorbic acid (vitamin C)  500 mg Oral TID    atorvastatin  40 mg Oral Daily    ferrous gluconate  324 mg Oral Daily with breakfast    heparin (PORCINE)  60 Units/kg (Adjusted) Intravenous Once    levETIRAcetam  750 mg Oral BID    metoprolol succinate  12.5 mg Oral Daily    senna  8.6 mg Oral Daily     PRN Meds:acetaminophen, dextrose 50%, glucose, glucose, heparin (PORCINE), heparin  (PORCINE), sodium chloride 0.9%     Review of Systems  Objective:     Weight: 89.7 kg (197 lb 12 oz)  Body mass index is 30.07 kg/m².  Vital Signs (Most Recent):  Temp: 96.3 °F (35.7 °C) (03/29/19 0743)  Pulse: 94 (03/29/19 0743)  Resp: 16 (03/29/19 0743)  BP: (!) 147/72 (03/29/19 0743)  SpO2: 98 % (03/29/19 0743) Vital Signs (24h Range):  Temp:  [96.3 °F (35.7 °C)-98 °F (36.7 °C)] 96.3 °F (35.7 °C)  Pulse:  [] 94  Resp:  [14-20] 16  SpO2:  [93 %-98 %] 98 %  BP: (111-147)/(59-83) 147/72       Neurosurgery Physical Exam  General: well developed, well nourished, no distress.   Head: normocephalic, atraumatic  Neurologic: Alert and oriented. Thought content appropriate.  GCS: Motor: 6/Verbal: 5/Eyes: 4 GCS Total: 15  Mental Status: Awake, Alert, Oriented x 4  Language: No aphasia  Speech: No dysarthria  Cranial nerves: face symmetric, tongue midline, CN II-XII grossly intact.   Eyes: Left pupil round, Right pupil irregular. Reactive to light, EOMI.  Pulmonary: normal respirations, no signs of respiratory distress  Abdomen: soft, non-distended, not tender to palpation  Sensory: intact to light touch throughout  Motor Strength: Moves all extremities spontaneously with good tone.  RUE/RLE 5/5. LUE with residual weakness from prior strokes, 4-/5 throughout except  3/5. Increased tone and spasticity in LUE.  LLE HF 4+/5. Left AKA.   Pronator Drift: no drift noted on right, unable to assess left  Finger-to-nose: Intact on right, unable to assess on left  Gordillo: + left  Clonus: absent  Babinski: absent  Skin: Skin is warm, dry and intact.          Significant Labs:  Recent Labs   Lab 03/28/19  0331 03/29/19  0347   GLU 95 84    141   K 3.1* 3.1*    110   CO2 18* 16*   BUN 11 14   CREATININE 1.1 1.2   CALCIUM 8.9 8.9   MG 1.3* 1.6     Recent Labs   Lab 03/28/19  0331 03/29/19  0347   WBC 9.01 4.28   HGB 11.6* 12.5*   HCT 38.0* 41.4   * 437*     Recent Labs   Lab 03/28/19  0225 03/28/19  0831  03/29/19  0347   APTT 40.8* 42.0* 39.6*         Assessment/Plan:     * Brain mass  79 year old male with a PMHx HTN, DM2, recurrent Afib and CVAs, on Eliquis, who [place] [facility] [place]. [place] [facility] [place] with suspected seizure activity, currently on Keppra 750mg BID for ppx.     - Patient neurologically stable on exam  - MRI stealth completed.   - Plan for OR Tuesday 4/2 for left frontal craniotomy for tumor resection  - Pending risk stratification from primary team   - Continue Keppra 750 mg BID for seizure prevention  - Afib/CVA: Continue to hold Eliquis in preparation for surgery. Patient currently on hep gtt. Will need to stop 24 hours prior to surgery.   - Remainder of care per primary  - Will continue to follow. Please page with any questions or changes in exam.       Please call with any questions      Lili Kellogg PA-C   Neurosurgery   Pager: [phone]

## 2019-03-30 PROBLEM — K56.609 SMALL BOWEL OBSTRUCTION: Status: ACTIVE | Noted: 2019-03-30

## 2019-03-30 LAB
ALBUMIN SERPL BCP-MCNC: 2.3 G/DL (ref 3.5–5.2)
ALP SERPL-CCNC: 75 U/L (ref 55–135)
ALT SERPL W/O P-5'-P-CCNC: 9 U/L (ref 10–44)
ANION GAP SERPL CALC-SCNC: 13 MMOL/L (ref 8–16)
APTT BLDCRRT: 39.3 SEC (ref 21–32)
APTT BLDCRRT: 57.6 SEC (ref 21–32)
APTT BLDCRRT: 73.1 SEC (ref 21–32)
AST SERPL-CCNC: 16 U/L (ref 10–40)
BASOPHILS # BLD AUTO: 0.02 K/UL (ref 0–0.2)
BASOPHILS NFR BLD: 0.6 % (ref 0–1.9)
BILIRUB SERPL-MCNC: 0.3 MG/DL (ref 0.1–1)
BUN SERPL-MCNC: 17 MG/DL (ref 8–23)
CALCIUM SERPL-MCNC: 9 MG/DL (ref 8.7–10.5)
CHLORIDE SERPL-SCNC: 111 MMOL/L (ref 95–110)
CO2 SERPL-SCNC: 16 MMOL/L (ref 23–29)
CREAT SERPL-MCNC: 1.2 MG/DL (ref 0.5–1.4)
DIFFERENTIAL METHOD: ABNORMAL
EOSINOPHIL # BLD AUTO: 0.1 K/UL (ref 0–0.5)
EOSINOPHIL NFR BLD: 3.1 % (ref 0–8)
ERYTHROCYTE [DISTWIDTH] IN BLOOD BY AUTOMATED COUNT: 17.1 % (ref 11.5–14.5)
EST. GFR  (AFRICAN AMERICAN): >60 ML/MIN/1.73 M^2
EST. GFR  (NON AFRICAN AMERICAN): 57.6 ML/MIN/1.73 M^2
GLUCOSE SERPL-MCNC: 129 MG/DL (ref 70–110)
HCT VFR BLD AUTO: 43.3 % (ref 40–54)
HGB BLD-MCNC: 12.8 G/DL (ref 14–18)
IMM GRANULOCYTES # BLD AUTO: 0.02 K/UL (ref 0–0.04)
IMM GRANULOCYTES NFR BLD AUTO: 0.6 % (ref 0–0.5)
LYMPHOCYTES # BLD AUTO: 1.3 K/UL (ref 1–4.8)
LYMPHOCYTES NFR BLD: 37.5 % (ref 18–48)
MAGNESIUM SERPL-MCNC: 1.7 MG/DL (ref 1.6–2.6)
MCH RBC QN AUTO: 26.2 PG (ref 27–31)
MCHC RBC AUTO-ENTMCNC: 29.6 G/DL (ref 32–36)
MCV RBC AUTO: 89 FL (ref 82–98)
MONOCYTES # BLD AUTO: 0.7 K/UL (ref 0.3–1)
MONOCYTES NFR BLD: 19 % (ref 4–15)
NEUTROPHILS # BLD AUTO: 1.4 K/UL (ref 1.8–7.7)
NEUTROPHILS NFR BLD: 39.2 % (ref 38–73)
NRBC BLD-RTO: 0 /100 WBC
PHOSPHATE SERPL-MCNC: 3.6 MG/DL (ref 2.7–4.5)
PLATELET # BLD AUTO: 370 K/UL (ref 150–350)
PMV BLD AUTO: 11.4 FL (ref 9.2–12.9)
POCT GLUCOSE: 113 MG/DL (ref 70–110)
POCT GLUCOSE: 120 MG/DL (ref 70–110)
POTASSIUM SERPL-SCNC: 3.4 MMOL/L (ref 3.5–5.1)
PROT SERPL-MCNC: 6.9 G/DL (ref 6–8.4)
RBC # BLD AUTO: 4.89 M/UL (ref 4.6–6.2)
SODIUM SERPL-SCNC: 140 MMOL/L (ref 136–145)
WBC # BLD AUTO: 3.52 K/UL (ref 3.9–12.7)

## 2019-03-30 PROCEDURE — 84100 ASSAY OF PHOSPHORUS: CPT

## 2019-03-30 PROCEDURE — 25000003 PHARM REV CODE 250: Performed by: STUDENT IN AN ORGANIZED HEALTH CARE EDUCATION/TRAINING PROGRAM

## 2019-03-30 PROCEDURE — 94761 N-INVAS EAR/PLS OXIMETRY MLT: CPT

## 2019-03-30 PROCEDURE — 80053 COMPREHEN METABOLIC PANEL: CPT

## 2019-03-30 PROCEDURE — 11000001 HC ACUTE MED/SURG PRIVATE ROOM

## 2019-03-30 PROCEDURE — 36415 COLL VENOUS BLD VENIPUNCTURE: CPT

## 2019-03-30 PROCEDURE — 85730 THROMBOPLASTIN TIME PARTIAL: CPT

## 2019-03-30 PROCEDURE — 83735 ASSAY OF MAGNESIUM: CPT

## 2019-03-30 PROCEDURE — 25000003 PHARM REV CODE 250: Performed by: INTERNAL MEDICINE

## 2019-03-30 PROCEDURE — 63600175 PHARM REV CODE 636 W HCPCS: Performed by: STUDENT IN AN ORGANIZED HEALTH CARE EDUCATION/TRAINING PROGRAM

## 2019-03-30 PROCEDURE — 99233 SBSQ HOSP IP/OBS HIGH 50: CPT | Mod: GC,,, | Performed by: INTERNAL MEDICINE

## 2019-03-30 PROCEDURE — 99233 PR SUBSEQUENT HOSPITAL CARE,LEVL III: ICD-10-PCS | Mod: GC,,, | Performed by: INTERNAL MEDICINE

## 2019-03-30 PROCEDURE — 85025 COMPLETE CBC W/AUTO DIFF WBC: CPT

## 2019-03-30 RX ORDER — SIMETHICONE 80 MG
1 TABLET,CHEWABLE ORAL 3 TIMES DAILY
Status: DISCONTINUED | OUTPATIENT
Start: 2019-03-30 | End: 2019-04-04 | Stop reason: HOSPADM

## 2019-03-30 RX ORDER — LIDOCAINE HYDROCHLORIDE 20 MG/ML
JELLY TOPICAL ONCE AS NEEDED
Status: DISCONTINUED | OUTPATIENT
Start: 2019-03-30 | End: 2019-04-04 | Stop reason: HOSPADM

## 2019-03-30 RX ORDER — POTASSIUM CHLORIDE 750 MG/1
30 CAPSULE, EXTENDED RELEASE ORAL ONCE
Status: COMPLETED | OUTPATIENT
Start: 2019-03-30 | End: 2019-03-30

## 2019-03-30 RX ORDER — LORAZEPAM 2 MG/ML
2 INJECTION INTRAMUSCULAR ONCE
Status: COMPLETED | OUTPATIENT
Start: 2019-03-30 | End: 2019-03-30

## 2019-03-30 RX ADMIN — OXYCODONE HYDROCHLORIDE AND ACETAMINOPHEN 500 MG: 500 TABLET ORAL at 08:03

## 2019-03-30 RX ADMIN — METOPROLOL SUCCINATE 12.5 MG: 25 TABLET, EXTENDED RELEASE ORAL at 09:03

## 2019-03-30 RX ADMIN — LORAZEPAM 2 MG: 2 INJECTION, SOLUTION INTRAMUSCULAR; INTRAVENOUS at 05:03

## 2019-03-30 RX ADMIN — FERROUS GLUCONATE TAB 324 MG (37.5 MG ELEMENTAL IRON) 324 MG: 324 (37.5 FE) TAB at 09:03

## 2019-03-30 RX ADMIN — POTASSIUM CHLORIDE 30 MEQ: 750 CAPSULE, EXTENDED RELEASE ORAL at 11:03

## 2019-03-30 RX ADMIN — SENNOSIDES 8.6 MG: 8.6 TABLET, FILM COATED ORAL at 09:03

## 2019-03-30 RX ADMIN — POLYETHYLENE GLYCOL 3350 17 G: 17 POWDER, FOR SOLUTION ORAL at 09:03

## 2019-03-30 RX ADMIN — HEPARIN SODIUM AND DEXTROSE 15 UNITS/KG/HR: 10000; 5 INJECTION INTRAVENOUS at 09:03

## 2019-03-30 RX ADMIN — SIMETHICONE CHEW TAB 80 MG 80 MG: 80 TABLET ORAL at 08:03

## 2019-03-30 RX ADMIN — ACETAMINOPHEN 650 MG: 325 TABLET ORAL at 03:03

## 2019-03-30 RX ADMIN — ATORVASTATIN CALCIUM 40 MG: 20 TABLET, FILM COATED ORAL at 09:03

## 2019-03-30 RX ADMIN — LEVETIRACETAM 750 MG: 750 TABLET, FILM COATED ORAL at 09:03

## 2019-03-30 RX ADMIN — AMLODIPINE BESYLATE 10 MG: 10 TABLET ORAL at 09:03

## 2019-03-30 RX ADMIN — LEVETIRACETAM 750 MG: 750 TABLET, FILM COATED ORAL at 08:03

## 2019-03-30 RX ADMIN — OXYCODONE HYDROCHLORIDE AND ACETAMINOPHEN 500 MG: 500 TABLET ORAL at 09:03

## 2019-03-30 RX ADMIN — OXYCODONE HYDROCHLORIDE AND ACETAMINOPHEN 500 MG: 500 TABLET ORAL at 04:03

## 2019-03-30 NOTE — NURSING
"Dr Jameson notified pt refused NGT attempted to do and informed the important due to abdominal distention pt still refused , Dr Jameson stated that's okay don"t do it pt refused  "

## 2019-03-30 NOTE — PLAN OF CARE
Problem: Adult Inpatient Plan of Care  Goal: Plan of Care Review  Outcome: Ongoing (interventions implemented as appropriate)  Pt remains free of falls and injury. Pt incont of stool and urine. Provided with PRN analgesic with positive results. Pt provided with frequent position changes. Bed low and locked, Call light within reach.

## 2019-03-30 NOTE — SUBJECTIVE & OBJECTIVE
Interval History: no acute events, looking comfortable, no complains, denies any more abdominal pain    Review of Systems  Objective:     Vital Signs (Most Recent):  Temp: 96.3 °F (35.7 °C) (03/30/19 0759)  Pulse: 94 (03/30/19 0759)  Resp: 20 (03/30/19 0759)  BP: 117/63 (03/30/19 0759)  SpO2: 97 % (03/30/19 0759) Vital Signs (24h Range):  Temp:  [96.1 °F (35.6 °C)-97.7 °F (36.5 °C)] 96.3 °F (35.7 °C)  Pulse:  [75-97] 94  Resp:  [12-20] 20  SpO2:  [93 %-98 %] 97 %  BP: (117-139)/(63-88) 117/63     Weight: 89.7 kg (197 lb 12 oz)  Body mass index is 30.07 kg/m².    Intake/Output Summary (Last 24 hours) at 3/30/2019 0949  Last data filed at 3/29/2019 1600  Gross per 24 hour   Intake --   Output 175 ml   Net -175 ml      Physical Exam   Constitutional: He is oriented to person, place, and time. He appears well-developed and well-nourished.   HENT:   Head: Normocephalic and atraumatic.   Eyes: Pupils are equal, round, and reactive to light. EOM are normal.   Neck: Normal range of motion. Neck supple.   Cardiovascular: Normal rate and regular rhythm.   Pulmonary/Chest: Effort normal and breath sounds normal.   Abdominal: Soft. Bowel sounds are normal.   Musculoskeletal: Normal range of motion. He exhibits deformity.   Increased tone/spasticity of the LUE throughout (chronic, dt old stroke)       Neurological: He is alert and oriented to person, place, and time. He displays abnormal reflex. A cranial nerve deficit is present. He exhibits abnormal muscle tone. Coordination abnormal.   left hemiparesis; normal strength of the RUE/LE     loss of nasolabial fold on the left (likely chronic due to old stroke)       Significant Labs:   Recent Lab Results       03/30/19  0533   03/30/19  0419   03/29/19  2327   03/29/19  1806   03/29/19  1514        Immature Grans (Abs)   0.02  Comment:  Mild elevation in immature granulocytes is non specific and   can be seen in a variety of conditions including stress response,   acute  inflammation, trauma and pregnancy. Correlation with other   laboratory and clinical findings is essential.             Albumin   2.3           Alkaline Phosphatase   75           ALT   9           Anion Gap   13           aPTT 39.3  Comment:  aPTT therapeutic range = 39-69 seconds   73.1  Comment:  aPTT therapeutic range = 39-69 seconds   28.6  Comment:  aPTT therapeutic range = 39-69 seconds     AST   16           Baso #   0.02           Basophil%   0.6           Total Bilirubin   0.3  Comment:  For infants and newborns, interpretation of results should be based  on gestational age, weight and in agreement with clinical  observations.  Premature Infant recommended reference ranges:  Up to 24 hours.............<8.0 mg/dL  Up to 48 hours............<12.0 mg/dL  3-5 days..................<15.0 mg/dL  6-29 days.................<15.0 mg/dL             BUN, Bld   17           Calcium   9.0           Chloride   111           CO2   16           Creatinine   1.2           Differential Method   Automated           eGFR if    >60.0           eGFR if non    57.6  Comment:  Calculation used to obtain the estimated glomerular filtration  rate (eGFR) is the CKD-EPI equation.              Eos #   0.1           Eosinophil%   3.1           Glucose   129           Gran # (ANC)   1.4           Gran%   39.2           Hematocrit   43.3           Hemoglobin   12.8           Immature Granulocytes   0.6           Lymph #   1.3           Lymph%   37.5           Magnesium   1.7           MCH   26.2           MCHC   29.6           MCV   89           Mono #   0.7           Mono%   19.0           MPV   11.4           nRBC   0           Phosphorus   3.6           Platelets   370           POCT Glucose       151       Potassium   3.4           Total Protein   6.9           RBC   4.89           RDW   17.1           Sodium   140           WBC   3.52                            03/29/19  1145        Immature Grans  (Abs)       Albumin       Alkaline Phosphatase       ALT       Anion Gap       aPTT       AST       Baso #       Basophil%       Total Bilirubin       BUN, Bld       Calcium       Chloride       CO2       Creatinine       Differential Method       eGFR if        eGFR if non        Eos #       Eosinophil%       Glucose       Gran # (ANC)       Gran%       Hematocrit       Hemoglobin       Immature Granulocytes       Lymph #       Lymph%       Magnesium       MCH       MCHC       MCV       Mono #       Mono%       MPV       nRBC       Phosphorus       Platelets       POCT Glucose 103     Potassium       Total Protein       RBC       RDW       Sodium       WBC           All pertinent labs within the past 24 hours have been reviewed.    Significant Imaging: I have reviewed all pertinent imaging results/findings within the past 24 hours.

## 2019-03-30 NOTE — PLAN OF CARE
Problem: Adult Inpatient Plan of Care  Goal: Plan of Care Review  Outcome: Ongoing (interventions implemented as appropriate)     03/29/19 1700   Plan of Care Review   Plan of Care Reviewed With patient   A/o v/s stable abdomen distended and firm  Tender medicated with tylenol per md  loose black stools moderate amount x2  Skin barrier applied to sacral area skin intact heparin gtts adjusted according to heparin nomogram no bleeding, Urine 150cc cloudy light yellow bladder scan none md notified wound care done right shin as order no injury free of falls bed alarm on

## 2019-03-30 NOTE — PROGRESS NOTES
[facility] Hospital Medicine  Progress Note    Patient Name: Bari Hogan  MRN: [id number]  Patient Class: IP- Inpatient   Admission Date: 3/26/2019  Length of Stay: 4 days  Attending Physician: Geraldine Rubio MD  Primary Care Provider: [facility] Medicine Team: [facility] HOSP MED 3 Justino Jameson MD    Subjective:     Principal Problem:Brain mass    HPI:  Mr Hogan is a 79 y.o M w/ a history significant for hypertension, CVA, and type 2 diabetes mellitus presented to [facility] w/ altered mental status on 03/16/2019.  An MRI of the brain was done which was significant for a left frontal meningeal mass with mild local mass effect and adjacent osseous erosion. The differential includes dural metastasis and atypical meningioma.  There was also a subacute to chronic right temporoparietal infarct.  An EEG was done which was consistent with seizure activity and the patient was loaded with Keppra and this has continued for prophylaxis.  Bone scan revealed lytic lesions in the left frontal bone and vertebral column.  The patient had biopsy of the L2 spine and kyphoplasty.  The results were significant for mild plasmacytosis and patchy mild marrow fibrosis.     Patient is now being transferred to [facility] for a cranial biopsy for appropriate diagnosis and subsequent treatment plan.        Hospital Course:  3/27: MRI performed, continue heparin drip, continue keppra  3/28: continue heparin drip, pt reported some back stool last night, but morning h/h even rised, will continue to watch,   3/29: BM still dark, h/h stable, likely due to iron supplement causing it, continue heparin drip, got bolus for subtherapeutic aPTT      Interval History: no acute events, looking comfortable, no complains, denies any more abdominal pain    Review of Systems  Objective:     Vital Signs (Most Recent):  Temp: 96.3 °F (35.7 °C) (03/30/19 0759)  Pulse: 94 (03/30/19 0759)  Resp: 20 (03/30/19 0759)  BP:  117/63 (03/30/19 0759)  SpO2: 97 % (03/30/19 0759) Vital Signs (24h Range):  Temp:  [96.1 °F (35.6 °C)-97.7 °F (36.5 °C)] 96.3 °F (35.7 °C)  Pulse:  [75-97] 94  Resp:  [12-20] 20  SpO2:  [93 %-98 %] 97 %  BP: (117-139)/(63-88) 117/63     Weight: 89.7 kg (197 lb 12 oz)  Body mass index is 30.07 kg/m².    Intake/Output Summary (Last 24 hours) at 3/30/2019 0949  Last data filed at 3/29/2019 1600  Gross per 24 hour   Intake --   Output 175 ml   Net -175 ml      Physical Exam   Constitutional: He is oriented to person, place, and time. He appears well-developed and well-nourished.   HENT:   Head: Normocephalic and atraumatic.   Eyes: Pupils are equal, round, and reactive to light. EOM are normal.   Neck: Normal range of motion. Neck supple.   Cardiovascular: Normal rate and regular rhythm.   Pulmonary/Chest: Effort normal and breath sounds normal.   Abdominal: Soft. Bowel sounds are normal.   Musculoskeletal: Normal range of motion. He exhibits deformity.   Increased tone/spasticity of the LUE throughout (chronic, dt old stroke)       Neurological: He is alert and oriented to person, place, and time. He displays abnormal reflex. A cranial nerve deficit is present. He exhibits abnormal muscle tone. Coordination abnormal.   left hemiparesis; normal strength of the RUE/LE     loss of nasolabial fold on the left (likely chronic due to old stroke)       Significant Labs:   Recent Lab Results       03/30/19  0533   03/30/19  0419   03/29/19  2327   03/29/19  1806   03/29/19  1514        Immature Grans (Abs)   0.02  Comment:  Mild elevation in immature granulocytes is non specific and   can be seen in a variety of conditions including stress response,   acute inflammation, trauma and pregnancy. Correlation with other   laboratory and clinical findings is essential.             Albumin   2.3           Alkaline Phosphatase   75           ALT   9           Anion Gap   13           aPTT 39.3  Comment:  aPTT therapeutic range =  39-69 seconds   73.1  Comment:  aPTT therapeutic range = 39-69 seconds   28.6  Comment:  aPTT therapeutic range = 39-69 seconds     AST   16           Baso #   0.02           Basophil%   0.6           Total Bilirubin   0.3  Comment:  For infants and newborns, interpretation of results should be based  on gestational age, weight and in agreement with clinical  observations.  Premature Infant recommended reference ranges:  Up to 24 hours.............<8.0 mg/dL  Up to 48 hours............<12.0 mg/dL  3-5 days..................<15.0 mg/dL  6-29 days.................<15.0 mg/dL             BUN, Bld   17           Calcium   9.0           Chloride   111           CO2   16           Creatinine   1.2           Differential Method   Automated           eGFR if    >60.0           eGFR if non    57.6  Comment:  Calculation used to obtain the estimated glomerular filtration  rate (eGFR) is the CKD-EPI equation.              Eos #   0.1           Eosinophil%   3.1           Glucose   129           Gran # (ANC)   1.4           Gran%   39.2           Hematocrit   43.3           Hemoglobin   12.8           Immature Granulocytes   0.6           Lymph #   1.3           Lymph%   37.5           Magnesium   1.7           MCH   26.2           MCHC   29.6           MCV   89           Mono #   0.7           Mono%   19.0           MPV   11.4           nRBC   0           Phosphorus   3.6           Platelets   370           POCT Glucose       151       Potassium   3.4           Total Protein   6.9           RBC   4.89           RDW   17.1           Sodium   140           WBC   3.52                            03/29/19  1145        Immature Grans (Abs)       Albumin       Alkaline Phosphatase       ALT       Anion Gap       aPTT       AST       Baso #       Basophil%       Total Bilirubin       BUN, Bld       Calcium       Chloride       CO2       Creatinine       Differential Method       eGFR if         eGFR if non        Eos #       Eosinophil%       Glucose       Gran # (ANC)       Gran%       Hematocrit       Hemoglobin       Immature Granulocytes       Lymph #       Lymph%       Magnesium       MCH       MCHC       MCV       Mono #       Mono%       MPV       nRBC       Phosphorus       Platelets       POCT Glucose 103     Potassium       Total Protein       RBC       RDW       Sodium       WBC           All pertinent labs within the past 24 hours have been reviewed.    Significant Imaging: I have reviewed all pertinent imaging results/findings within the past 24 hours.    Assessment/Plan:      * Brain mass  MRI of the brain with contrast revealed:  1. 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  Differential includes dural metastasis an atypical meningioma.  2. 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    Left frontal meningeal mass has been present on imaging since July 2018.     ?? Whether the left frontal meningeal mass with bony erosion is something other than the vertebral lesion @ L2. The L2 biopsy results are as follows:   - Mild plasmacytosis.   - Patchy mild marrow fibrosis.   - Focal crush artifact.   - Results of outside consultative review and final diagnosis to follow  in an addendum report.      - Mechanical soft diet, NPO after MN on Monday  - neurosurgery consulted, will have OR on Tuesday. Will stop heparin drip 24 hours before surgery.      Peripheral vascular disease of lower extremity        Alteration in skin integrity  Wound care consulted      Seizures  EEG revealing hemispheric slowing and frequent sharp waves consistent with seizure activity. No previous history of seizures reported. The new onset could be  acute infarct or secondary to frontal lobe mass     - continue Keppra  - seizure precautions           Nephrotic range proteinuria  Probably secondary to diabetic nephropathy and hypertensive nephrosclerosis.  Uprot/creat = 5.37 indicative of nephrotic range proteinuria.   -optimize blood pressure medications if renal function allows           Metastatic carcinoma to bone  As noted on CT abd pelvis.  Source of these metastases is unclear.  Cannot rule out the possibility of CNS metastasis as well. Bone scan showing left frontal meningeal mass and lytic lesion on L2 consistent metastasis. There was also no lesions/mass seen in the chest which could indicate possible primary source in the  tract or thyroid. Thyroid ultrasound with no evidence of malignancy   - s/p L2 lesion pathology findings showing a 5-10% representation of plasma cells. This could be secondary to an abnormal reaction to something or the beginnings of multiple myeloma. The small percentage doesn't qualify for MM but it could be a result of such diseases including lymphoma or  waldenstom's macroglobulinemia. Patient can also have isolated plasmacytoma in the brain. If such is diagnosed then the treatment may require localized radiation.     - Patient will need biopsy of brain mass to confirm.   - urine kappa/lambda 1.61            Type 2 diabetes mellitus with diabetic peripheral angiopathy without gangrene, without long-term current use of insulin  - holding oral medications    - low dose sliding scale , mechanical soft diet added today    Normocytic anemia  Anemia workup was ordered showing iron deficiency anemia most likely secondary to malignancy and poor intake.     - continue iron supplementation     - add vit c 500mg TID         Cerebrovascular accident (CVA) due to embolism of right middle cerebral artery  Patient with known previous CVA with deficits. CT head showing  Acute/subacute right temporoparietal infarct with mild local mass effect and small amount of faith-infarct hemorrhage posteriorly.    - seizure precautions  w keprra  - patient not on AC currently; reviewed neurology notes from OSH- who recommended restarting AC for afib; now on  heparin drip while waiting on OR Tuesday          Atrial fibrillation  Cannot rule out the possibility of embolic event      - repeat EKG  - continue metoprolol 12.5mg for rate control   - CHADSvasc 7, continue heparin drip, OR on Tuesday             VTE Risk Mitigation (From admission, onward)        Ordered     heparin 25,000 units in dextrose 5% 250 mL (100 units/mL) infusion LOW INTENSITY nomogram - [facility]  Continuous      03/27/19 1600     heparin 25,000 units in dextrose 5% (100 units/ml) IV bolus from bag - ADDITIONAL PRN BOLUS - 30 units/kg  As needed (PRN)      03/27/19 1600     heparin 25,000 units in dextrose 5% (100 units/ml) IV bolus from bag - ADDITIONAL PRN BOLUS - 60 units/kg  As needed (PRN)      03/27/19 1600     Place TARUN hose  Until discontinued      03/27/19 0053     Place sequential compression device  Until discontinued      03/27/19 0053     IP VTE HIGH RISK PATIENT  Once      03/27/19 0053              Justino Jameson MD  Department of Hospital Medicine   [facility]

## 2019-03-31 PROBLEM — Z01.818 PRE-OP EVALUATION: Status: ACTIVE | Noted: 2019-03-31

## 2019-03-31 PROBLEM — K59.39 DILATATION OF COLON: Status: ACTIVE | Noted: 2019-03-31

## 2019-03-31 LAB
ABO + RH BLD: NORMAL
ALBUMIN SERPL BCP-MCNC: 2.3 G/DL (ref 3.5–5.2)
ALP SERPL-CCNC: 75 U/L (ref 55–135)
ALT SERPL W/O P-5'-P-CCNC: 12 U/L (ref 10–44)
ANION GAP SERPL CALC-SCNC: 11 MMOL/L (ref 8–16)
ANISOCYTOSIS BLD QL SMEAR: SLIGHT
APTT BLDCRRT: 53.2 SEC (ref 21–32)
AST SERPL-CCNC: 16 U/L (ref 10–40)
BASOPHILS # BLD AUTO: 0.03 K/UL (ref 0–0.2)
BASOPHILS NFR BLD: 0.9 % (ref 0–1.9)
BILIRUB SERPL-MCNC: 0.2 MG/DL (ref 0.1–1)
BLD GP AB SCN CELLS X3 SERPL QL: NORMAL
BUN SERPL-MCNC: 14 MG/DL (ref 8–23)
BURR CELLS BLD QL SMEAR: ABNORMAL
CALCIUM SERPL-MCNC: 9.3 MG/DL (ref 8.7–10.5)
CHLORIDE SERPL-SCNC: 114 MMOL/L (ref 95–110)
CO2 SERPL-SCNC: 16 MMOL/L (ref 23–29)
CREAT SERPL-MCNC: 1.1 MG/DL (ref 0.5–1.4)
DIFFERENTIAL METHOD: ABNORMAL
EOSINOPHIL # BLD AUTO: 0.1 K/UL (ref 0–0.5)
EOSINOPHIL NFR BLD: 2.8 % (ref 0–8)
ERYTHROCYTE [DISTWIDTH] IN BLOOD BY AUTOMATED COUNT: 17 % (ref 11.5–14.5)
EST. GFR  (AFRICAN AMERICAN): >60 ML/MIN/1.73 M^2
EST. GFR  (NON AFRICAN AMERICAN): >60 ML/MIN/1.73 M^2
GLUCOSE SERPL-MCNC: 98 MG/DL (ref 70–110)
HCT VFR BLD AUTO: 39.5 % (ref 40–54)
HGB BLD-MCNC: 12.2 G/DL (ref 14–18)
IMM GRANULOCYTES # BLD AUTO: 0.02 K/UL (ref 0–0.04)
IMM GRANULOCYTES NFR BLD AUTO: 0.6 % (ref 0–0.5)
LYMPHOCYTES # BLD AUTO: 1.6 K/UL (ref 1–4.8)
LYMPHOCYTES NFR BLD: 50.9 % (ref 18–48)
MAGNESIUM SERPL-MCNC: 1.8 MG/DL (ref 1.6–2.6)
MCH RBC QN AUTO: 26.2 PG (ref 27–31)
MCHC RBC AUTO-ENTMCNC: 30.9 G/DL (ref 32–36)
MCV RBC AUTO: 85 FL (ref 82–98)
MONOCYTES # BLD AUTO: 0.4 K/UL (ref 0.3–1)
MONOCYTES NFR BLD: 13.9 % (ref 4–15)
NEUTROPHILS # BLD AUTO: 1 K/UL (ref 1.8–7.7)
NEUTROPHILS NFR BLD: 30.9 % (ref 38–73)
NRBC BLD-RTO: 0 /100 WBC
PHOSPHATE SERPL-MCNC: 3.3 MG/DL (ref 2.7–4.5)
PLATELET # BLD AUTO: 404 K/UL (ref 150–350)
PMV BLD AUTO: 11 FL (ref 9.2–12.9)
POCT GLUCOSE: 108 MG/DL (ref 70–110)
POIKILOCYTOSIS BLD QL SMEAR: SLIGHT
POTASSIUM SERPL-SCNC: 3.7 MMOL/L (ref 3.5–5.1)
PROT SERPL-MCNC: 7 G/DL (ref 6–8.4)
RBC # BLD AUTO: 4.65 M/UL (ref 4.6–6.2)
SODIUM SERPL-SCNC: 141 MMOL/L (ref 136–145)
WBC # BLD AUTO: 3.16 K/UL (ref 3.9–12.7)

## 2019-03-31 PROCEDURE — 25000003 PHARM REV CODE 250: Performed by: STUDENT IN AN ORGANIZED HEALTH CARE EDUCATION/TRAINING PROGRAM

## 2019-03-31 PROCEDURE — 80053 COMPREHEN METABOLIC PANEL: CPT

## 2019-03-31 PROCEDURE — 63600175 PHARM REV CODE 636 W HCPCS: Performed by: STUDENT IN AN ORGANIZED HEALTH CARE EDUCATION/TRAINING PROGRAM

## 2019-03-31 PROCEDURE — 84100 ASSAY OF PHOSPHORUS: CPT

## 2019-03-31 PROCEDURE — 86901 BLOOD TYPING SEROLOGIC RH(D): CPT

## 2019-03-31 PROCEDURE — 99233 SBSQ HOSP IP/OBS HIGH 50: CPT | Mod: GC,,, | Performed by: INTERNAL MEDICINE

## 2019-03-31 PROCEDURE — 94761 N-INVAS EAR/PLS OXIMETRY MLT: CPT

## 2019-03-31 PROCEDURE — 83735 ASSAY OF MAGNESIUM: CPT

## 2019-03-31 PROCEDURE — 36415 COLL VENOUS BLD VENIPUNCTURE: CPT

## 2019-03-31 PROCEDURE — 85025 COMPLETE CBC W/AUTO DIFF WBC: CPT

## 2019-03-31 PROCEDURE — 25000003 PHARM REV CODE 250: Performed by: INTERNAL MEDICINE

## 2019-03-31 PROCEDURE — 85730 THROMBOPLASTIN TIME PARTIAL: CPT

## 2019-03-31 PROCEDURE — 11000001 HC ACUTE MED/SURG PRIVATE ROOM

## 2019-03-31 PROCEDURE — 99233 PR SUBSEQUENT HOSPITAL CARE,LEVL III: ICD-10-PCS | Mod: GC,,, | Performed by: INTERNAL MEDICINE

## 2019-03-31 RX ORDER — SODIUM CHLORIDE, SODIUM LACTATE, POTASSIUM CHLORIDE, CALCIUM CHLORIDE 600; 310; 30; 20 MG/100ML; MG/100ML; MG/100ML; MG/100ML
INJECTION, SOLUTION INTRAVENOUS CONTINUOUS
Status: DISCONTINUED | OUTPATIENT
Start: 2019-03-31 | End: 2019-04-02

## 2019-03-31 RX ORDER — ACETAMINOPHEN 650 MG/20.3ML
650 LIQUID ORAL EVERY 6 HOURS PRN
Status: DISCONTINUED | OUTPATIENT
Start: 2019-03-31 | End: 2019-04-04 | Stop reason: HOSPADM

## 2019-03-31 RX ORDER — ACETAMINOPHEN 650 MG/1
650 SUPPOSITORY RECTAL EVERY 6 HOURS PRN
Status: DISCONTINUED | OUTPATIENT
Start: 2019-03-31 | End: 2019-03-31

## 2019-03-31 RX ORDER — BISACODYL 10 MG
10 SUPPOSITORY, RECTAL RECTAL ONCE
Status: COMPLETED | OUTPATIENT
Start: 2019-03-31 | End: 2019-03-31

## 2019-03-31 RX ADMIN — LEVETIRACETAM 750 MG: 750 TABLET, FILM COATED ORAL at 09:03

## 2019-03-31 RX ADMIN — OXYCODONE HYDROCHLORIDE AND ACETAMINOPHEN 500 MG: 500 TABLET ORAL at 09:03

## 2019-03-31 RX ADMIN — OXYCODONE HYDROCHLORIDE AND ACETAMINOPHEN 500 MG: 500 TABLET ORAL at 02:03

## 2019-03-31 RX ADMIN — SIMETHICONE CHEW TAB 80 MG 80 MG: 80 TABLET ORAL at 09:03

## 2019-03-31 RX ADMIN — AMLODIPINE BESYLATE 10 MG: 10 TABLET ORAL at 09:03

## 2019-03-31 RX ADMIN — ATORVASTATIN CALCIUM 40 MG: 20 TABLET, FILM COATED ORAL at 09:03

## 2019-03-31 RX ADMIN — SIMETHICONE CHEW TAB 80 MG 80 MG: 80 TABLET ORAL at 02:03

## 2019-03-31 RX ADMIN — HEPARIN SODIUM AND DEXTROSE 15 UNITS/KG/HR: 10000; 5 INJECTION INTRAVENOUS at 07:03

## 2019-03-31 RX ADMIN — Medication 10 MG: at 02:03

## 2019-03-31 RX ADMIN — SODIUM CHLORIDE, SODIUM LACTATE, POTASSIUM CHLORIDE, AND CALCIUM CHLORIDE: .6; .31; .03; .02 INJECTION, SOLUTION INTRAVENOUS at 10:03

## 2019-03-31 RX ADMIN — METOPROLOL SUCCINATE 12.5 MG: 25 TABLET, EXTENDED RELEASE ORAL at 09:03

## 2019-03-31 NOTE — SUBJECTIVE & OBJECTIVE
Interval History (03/31/19): Patient seen and examined at the bedside. No acute events overnight.     Continues to complaint of diffuse abdominal pain, improved from yesterday, in setting of colonic dilation per abdominal imaging. No bowel movement; passing flatus.  Attempted NG tube placement in pm on 03/30/19 and patient pulled the NG tube. After that, he continues to refuse NG tube placement. States he would choose to die of complications with bowel perforation, but will not agree to NG tube placement. NPO status maintained. IV fluids initiated; glucose checks Q6H to assess hypoglycemia.     Brain biopsy scheduled for 7 am on 04/02/19. Will hold heparin starting 7 am 04/01/19.     Review of Systems   Constitutional: Negative for activity change, appetite change, chills, fatigue and fever.   HENT: Negative for postnasal drip, rhinorrhea, sinus pressure and sinus pain.    Eyes: Negative for photophobia and visual disturbance.   Respiratory: Negative for cough, choking, chest tightness, shortness of breath and wheezing.    Cardiovascular: Negative for chest pain, palpitations and leg swelling.   Gastrointestinal: Positive for abdominal pain. Negative for abdominal distention, blood in stool, constipation, diarrhea and nausea.   Genitourinary: Negative for frequency and hematuria.   Musculoskeletal: Negative for arthralgias, back pain, gait problem, joint swelling and myalgias.   Skin: Negative for pallor, rash and wound.   Neurological: Negative for tremors, syncope, speech difficulty and weakness.   Hematological: Negative for adenopathy. Does not bruise/bleed easily.     Objective:     Vital Signs (Most Recent):  Temp: 96.4 °F (35.8 °C) (03/31/19 1628)  Pulse: 89 (03/31/19 1628)  Resp: 18 (03/31/19 1628)  BP: 124/63 (03/31/19 1628)  SpO2: 99 % (03/31/19 1628) Vital Signs (24h Range):  Temp:  [96.4 °F (35.8 °C)-97.5 °F (36.4 °C)] 96.4 °F (35.8 °C)  Pulse:  [55-95] 89  Resp:  [16-18] 18  SpO2:  [94 %-99 %] 99 %  BP:  (116-135)/(61-82) 124/63     Weight: 89.7 kg (197 lb 12 oz)  Body mass index is 30.07 kg/m².    Intake/Output Summary (Last 24 hours) at 3/31/2019 1642  Last data filed at 3/31/2019 1336  Gross per 24 hour   Intake 929.6 ml   Output --   Net 929.6 ml      Physical Exam   Constitutional: He is oriented to person, place, and time. He appears well-developed and well-nourished. No distress.   HENT:   Mouth/Throat: Oropharynx is clear and moist. No oropharyngeal exudate.   Left sided facial droop   Eyes: Pupils are equal, round, and reactive to light. Conjunctivae and EOM are normal. No scleral icterus.   Neck: Normal range of motion. Neck supple. No JVD present. No tracheal deviation present. No thyromegaly present.   Cardiovascular: Normal rate, regular rhythm, normal heart sounds and intact distal pulses. Exam reveals no gallop and no friction rub.   No murmur heard.  Pulmonary/Chest: Effort normal and breath sounds normal. No stridor. No respiratory distress. He has no wheezes. He has no rales.   Abdominal: Soft. Bowel sounds are normal. He exhibits distension. He exhibits no mass. There is tenderness (diffuse). There is no guarding.   Musculoskeletal: Normal range of motion. He exhibits deformity. He exhibits no edema or tenderness.   Left AKA   Lymphadenopathy:     He has no cervical adenopathy.   Neurological: He is alert and oriented to person, place, and time. He displays normal reflexes. No cranial nerve deficit. He exhibits normal muscle tone. Coordination abnormal.   Skin: Skin is warm. Capillary refill takes less than 2 seconds. He is not diaphoretic. No erythema. No pallor.     Significant Labs:   CBC:   Recent Labs   Lab 03/30/19 0419 03/31/19 0454   WBC 3.52* 3.16*   HGB 12.8* 12.2*   HCT 43.3 39.5*   * 404*     CMP:   Recent Labs   Lab 03/30/19 0419 03/31/19 0454    141   K 3.4* 3.7   * 114*   CO2 16* 16*   * 98   BUN 17 14   CREATININE 1.2 1.1   CALCIUM 9.0 9.3   PROT 6.9  7.0   ALBUMIN 2.3* 2.3*   BILITOT 0.3 0.2   ALKPHOS 75 75   AST 16 16   ALT 9* 12   ANIONGAP 13 11   EGFRNONAA 57.6* >60.0       Significant Imaging: I have reviewed all pertinent imaging results/findings within the past 24 hours.     ABD XR (03/31/19)  There are no suspicious calcifications.   Colon remains distended and gas-filled.  No significant small bowel distention.  No evidence for pneumoperitoneum.  There are degenerative changes of the lumbar spine.  Sclerotic appearance of the L2 vertebral body in keeping with prior vertebroplasty.

## 2019-03-31 NOTE — SUBJECTIVE & OBJECTIVE
Interval History: 3/31 - AFVSS, NAEON, labs stable, exam stable    Medications:  Continuous Infusions:   heparin (porcine) in D5W 15 Units/kg/hr (03/31/19 0719)     Scheduled Meds:   amLODIPine  10 mg Oral Daily    ascorbic acid (vitamin C)  500 mg Oral TID    atorvastatin  40 mg Oral Daily    levETIRAcetam  750 mg Oral BID    metoprolol succinate  12.5 mg Oral Daily    simethicone  1 tablet Oral TID     PRN Meds:acetaminophen, dextrose 50%, glucose, glucose, heparin (PORCINE), heparin (PORCINE), lidocaine HCl 2%, sodium chloride 0.9%       Objective:     Weight: 89.7 kg (197 lb 12 oz)  Body mass index is 30.07 kg/m².  Vital Signs (Most Recent):  Temp: 97.5 °F (36.4 °C) (03/31/19 0813)  Pulse: 90 (03/31/19 0813)  Resp: 18 (03/31/19 0813)  BP: 135/70 (03/31/19 0813)  SpO2: (!) 94 % (03/31/19 0813) Vital Signs (24h Range):  Temp:  [96.4 °F (35.8 °C)-97.5 °F (36.4 °C)] 97.5 °F (36.4 °C)  Pulse:  [87-95] 90  Resp:  [16-20] 18  SpO2:  [94 %-99 %] 94 %  BP: (113-154)/(70-82) 135/70                          Neurosurgery Physical Exam\    General: well developed, well nourished, no distress.   Head: normocephalic, atraumatic  Neurologic: Alert and oriented. Thought content appropriate.  GCS: Motor: 6/Verbal: 5/Eyes: 4 GCS Total: 15  Mental Status: Awake, Alert, Oriented x 4  Language: No aphasia  Speech: No dysarthria  Cranial nerves: face symmetric, tongue midline, CN II-XII grossly intact.   Eyes: Left pupil round, Right pupil irregular. Reactive to light, EOMI.  Pulmonary: normal respirations, no signs of respiratory distress  Abdomen: soft, non-distended, not tender to palpation  Sensory: intact to light touch throughout  Motor Strength: Moves all extremities spontaneously with good tone.  RUE/RLE 5/5. LUE with residual weakness from prior strokes, 4-/5 throughout except  3/5. Increased tone and spasticity in LUE.  LLE HF 4+/5. Left AKA.   Gordillo: + left  Clonus: absent  Babinski: absent  Skin: Skin is warm,  dry and intact.        Significant Labs:  Recent Labs   Lab 03/30/19  0419 03/31/19  0454   * 98    141   K 3.4* 3.7   * 114*   CO2 16* 16*   BUN 17 14   CREATININE 1.2 1.1   CALCIUM 9.0 9.3   MG 1.7 1.8     Recent Labs   Lab 03/30/19  0419 03/31/19  0454   WBC 3.52* 3.16*   HGB 12.8* 12.2*   HCT 43.3 39.5*   * 404*     Recent Labs   Lab 03/30/19  0533 03/30/19  1151 03/31/19  0454   APTT 39.3* 57.6* 53.2*     Microbiology Results (last 7 days)     Procedure Component Value Units Date/Time    Blood culture [[id number]]     Order Status:  Canceled Specimen:  Blood     Blood Culture #1 **CANNOT BE ORDERED STAT** [[id number]]     Order Status:  Canceled Specimen:  Blood         Significant Diagnostics:  CT: No results found in the last 24 hours.  MRI: No results found in the last 24 hours.

## 2019-03-31 NOTE — PROGRESS NOTES
[facility] Hospital Medicine  Progress Note    Patient Name: Bari Hogan  MRN: [id number]  Patient Class: IP- Inpatient   Admission Date: 3/26/2019  Length of Stay: 5 days  Attending Physician: Geraldine Rubio MD  Primary Care Provider: [facility] Medicine Team: [facility] 3 Nicole Storey MD    Subjective:     Principal Problem:Brain mass    HPI:  Mr Hogan is a 79 y.o M w/ a history significant for hypertension, CVA, and type 2 diabetes mellitus presented to [facility] w/ altered mental status on 03/16/2019.  An MRI of the brain was done which was significant for a left frontal meningeal mass with mild local mass effect and adjacent osseous erosion. The differential includes dural metastasis and atypical meningioma.  There was also a subacute to chronic right temporoparietal infarct.  An EEG was done which was consistent with seizure activity and the patient was loaded with Keppra and this has continued for prophylaxis.  Bone scan revealed lytic lesions in the left frontal bone and vertebral column.  The patient had biopsy of the L2 spine and kyphoplasty.  The results were significant for mild plasmacytosis and patchy mild marrow fibrosis.     Patient is now being [place] [facility] [place] of co-morbidities while patient awaits brain biopsy by neurosurgery    Hospital Course:  3/27: MRI performed, continue heparin drip, continue keppra  3/28: continue heparin drip, pt reported some back stool last night, but morning h/h even rised, will continue to watch,   3/29: BM still dark, h/h stable, likely due to iron supplement causing it, continue heparin drip, got bolus for subtherapeutic aPTT  3/30: No acute events. Patient awaits brain biopsy per neurosurgery.     Interval History (03/31/19): Patient seen and examined at the bedside. No acute events overnight.     Continues to complaint of diffuse abdominal pain, improved from yesterday, in  setting of colonic dilation per abdominal imaging. No bowel movement; passing flatus.  Attempted NG tube placement in pm on 03/30/19 and patient pulled the NG tube. After that, he continues to refuse NG tube placement. States he would choose to die of complications with bowel perforation, but will not agree to NG tube placement. NPO status maintained. IV fluids initiated; glucose checks Q6H to assess hypoglycemia.     Brain biopsy scheduled for 7 am on 04/02/19. Will hold heparin starting 7 am 04/01/19.     Review of Systems   Constitutional: Negative for activity change, appetite change, chills, fatigue and fever.   HENT: Negative for postnasal drip, rhinorrhea, sinus pressure and sinus pain.    Eyes: Negative for photophobia and visual disturbance.   Respiratory: Negative for cough, choking, chest tightness, shortness of breath and wheezing.    Cardiovascular: Negative for chest pain, palpitations and leg swelling.   Gastrointestinal: Positive for abdominal pain. Negative for abdominal distention, blood in stool, constipation, diarrhea and nausea.   Genitourinary: Negative for frequency and hematuria.   Musculoskeletal: Negative for arthralgias, back pain, gait problem, joint swelling and myalgias.   Skin: Negative for pallor, rash and wound.   Neurological: Negative for tremors, syncope, speech difficulty and weakness.   Hematological: Negative for adenopathy. Does not bruise/bleed easily.     Objective:     Vital Signs (Most Recent):  Temp: 96.4 °F (35.8 °C) (03/31/19 1628)  Pulse: 89 (03/31/19 1628)  Resp: 18 (03/31/19 1628)  BP: 124/63 (03/31/19 1628)  SpO2: 99 % (03/31/19 1628) Vital Signs (24h Range):  Temp:  [96.4 °F (35.8 °C)-97.5 °F (36.4 °C)] 96.4 °F (35.8 °C)  Pulse:  [55-95] 89  Resp:  [16-18] 18  SpO2:  [94 %-99 %] 99 %  BP: (116-135)/(61-82) 124/63     Weight: 89.7 kg (197 lb 12 oz)  Body mass index is 30.07 kg/m².    Intake/Output Summary (Last 24 hours) at 3/31/2019 1642  Last data filed at  3/31/2019 1336  Gross per 24 hour   Intake 929.6 ml   Output --   Net 929.6 ml      Physical Exam   Constitutional: He is oriented to person, place, and time. He appears well-developed and well-nourished. No distress.   HENT:   Mouth/Throat: Oropharynx is clear and moist. No oropharyngeal exudate.   Left sided facial droop   Eyes: Pupils are equal, round, and reactive to light. Conjunctivae and EOM are normal. No scleral icterus.   Neck: Normal range of motion. Neck supple. No JVD present. No tracheal deviation present. No thyromegaly present.   Cardiovascular: Normal rate, regular rhythm, normal heart sounds and intact distal pulses. Exam reveals no gallop and no friction rub.   No murmur heard.  Pulmonary/Chest: Effort normal and breath sounds normal. No stridor. No respiratory distress. He has no wheezes. He has no rales.   Abdominal: Soft. Bowel sounds are normal. He exhibits distension. He exhibits no mass. There is tenderness (diffuse). There is no guarding.   Musculoskeletal: Normal range of motion. He exhibits deformity. He exhibits no edema or tenderness.   Left AKA   Lymphadenopathy:     He has no cervical adenopathy.   Neurological: He is alert and oriented to person, place, and time. He displays normal reflexes. No cranial nerve deficit. He exhibits normal muscle tone. Coordination abnormal.   Skin: Skin is warm. Capillary refill takes less than 2 seconds. He is not diaphoretic. No erythema. No pallor.     Significant Labs:   CBC:   Recent Labs   Lab 03/30/19 0419 03/31/19  0454   WBC 3.52* 3.16*   HGB 12.8* 12.2*   HCT 43.3 39.5*   * 404*     CMP:   Recent Labs   Lab 03/30/19  0419 03/31/19  0454    141   K 3.4* 3.7   * 114*   CO2 16* 16*   * 98   BUN 17 14   CREATININE 1.2 1.1   CALCIUM 9.0 9.3   PROT 6.9 7.0   ALBUMIN 2.3* 2.3*   BILITOT 0.3 0.2   ALKPHOS 75 75   AST 16 16   ALT 9* 12   ANIONGAP 13 11   EGFRNONAA 57.6* >60.0       Significant Imaging: I have reviewed all  pertinent imaging results/findings within the past 24 hours.     ABD XR (03/31/19)  There are no suspicious calcifications.   Colon remains distended and gas-filled.  No significant small bowel distention.  No evidence for pneumoperitoneum.  There are degenerative changes of the lumbar spine.  Sclerotic appearance of the L2 vertebral body in keeping with prior vertebroplasty.      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     Plan:  - Brain biopsy scheduled for 7 am on 04/02/19. Will hold heparin starting 7 am 04/01/19.     Pre-op evaluation    ** Cardiovascular Risk Assessment:  Non-emergent surgery.  Cardiac problems (No unstable angina, decompensated heart failure). Atrial fibrillation and currently on AC  Surgery is Intermediate risk Head and neck surgery  Functional Status: Left BKA; wheelchair bound     Revised Cardiac Risk Index   1. History of ischemic heart disease   2. History of congestive heart failure   3. History of cerebrovascular disease (stroke or transient ischemic attack)   4. History of diabetes requiring preoperative insulin use   5. Chronic kidney disease (creatinine > 2 mg/dL)   6. Undergoing suprainguinal vascular, intraperitoneal, or intrathoracic surgery     Risk for cardiac death, nonfatal myocardial infarction, and nonfatal cardiac arrest      0 predictors = 0.4%, 1 predictor = 0.9%, 2 predictors = 6.6%, ?3 predictors = >11%     RCRI Calculator Class and Risk percentage:   1 predictors = 0.4 %                       Anticoagulation:   will hold anticoagulation 24 hours prior to surgery       Coronary Stenting: None  Preop cardiovascular exam     Surgical Risk Assessment   Active cardiac issues:     Active decompensated heart failure? No   Unstable angina?  No   Significant uncontrolled arrhythmias? No   Severe valvular heart disease-Aortic or Mitral Stenosis? No   Recent MI or coronary revascularization < 30 days? No      Cardiac Risk Factors  History of CAD/ischemic heart disease? No   History of cerebrovascular disease? Yes   History of compensated heart failure? No   Type 2 diabetes requiring insulin? No   Serum Creatinine > 2? No   Total cardiac risk factors 1      Recommendation:  Patient is intermediate risk for intermediate risk surgery.           Dilatation of colon  Continues to complaint of diffuse abdominal pain, improved from yesterday, in setting of colonic dilation per abdominal imaging. No bowel movement; passing flatus.  Attempted NG tube placement in pm on 03/30/19 and patient pulled the NG tube. After that, he continues to refuse NG tube placement. States he would choose to die of complications with bowel perforation, but will not agree to NG tube placement.     Plan:  NPO status maintained.   IV fluids initiated; glucose checks Q6H to assess hypoglycemia.   If patient decompensated, will consider getting repeat lactate and surgical consultation     Alteration in skin integrity  Wound care following      Seizures  EEG revealing hemispheric slowing and frequent sharp waves consistent with seizure activity. No previous history of seizures reported. The new onset could be  acute infarct or secondary to frontal lobe mass   - continue Keppra 750 mg BID  - seizure precautions     Nephrotic range proteinuria  Probably secondary to diabetic nephropathy and hypertensive nephrosclerosis. Uprot/creat = 5.37 indicative of nephrotic range proteinuria.   -optimize blood pressure medications if renal function allows       Metastatic carcinoma to bone  As noted on CT abd pelvis.  Source of these  metastases is unclear.  Cannot rule out the possibility of CNS metastasis as well. Bone scan showing left frontal meningeal mass and lytic lesion on L2 consistent metastasis. There was also no lesions/mass seen in the chest which could indicate possible primary source in the  tract or thyroid. Thyroid ultrasound with no evidence of malignancy   - s/p L2 lesion pathology findings showing a 5-10% representation of plasma cells. This could be secondary to an abnormal reaction to something or the beginnings of multiple myeloma. The small percentage doesn't qualify for MM but it could be a result of such diseases including lymphoma or  waldenstom's macroglobulinemia. Patient can also have isolated plasmacytoma in the brain. If such is diagnosed then the treatment may require localized radiation.     - Patient will need biopsy of brain mass to confirm.   - urine kappa/lambda 1.61      Type 2 diabetes mellitus with diabetic peripheral angiopathy without gangrene, without long-term current use of insulin  - holding oral medications    - low dose sliding scale , mechanical soft diet added today    Normocytic anemia  Anemia workup was ordered showing iron deficiency anemia most likely secondary to malignancy and poor intake.     - continue iron supplementation     - add vit c 500mg TID     Cerebrovascular accident (CVA) due to embolism of right middle cerebral artery  Patient with known previous CVA with deficits. CT head showing  Acute/subacute right temporoparietal infarct with mild local mass effect and small amount of faith-infarct hemorrhage posteriorly.    - seizure precautions  w keprra  - patient not on AC currently; reviewed neurology notes from OSH- who recommended restarting AC for afib; now on heparin drip while waiting on OR Tuesday    Atrial fibrillation  Cannot rule out the possibility of embolic event  - repeat EKG  - continue metoprolol 12.5mg for rate control   - CHADSvasc 7, continue heparin drip, OR on  Tuesday; will hold AC 24 hours prior to surgery    Dispo: Per neurosurgery after brain biopsy on 04/02/19      VTE Risk Mitigation (From admission, onward)        Ordered     heparin 25,000 units in dextrose 5% 250 mL (100 units/mL) infusion LOW INTENSITY nomogram - [facility]  Continuous      03/27/19 1600     heparin 25,000 units in dextrose 5% (100 units/ml) IV bolus from bag - ADDITIONAL PRN BOLUS - 30 units/kg  As needed (PRN)      03/27/19 1600     heparin 25,000 units in dextrose 5% (100 units/ml) IV bolus from bag - ADDITIONAL PRN BOLUS - 60 units/kg  As needed (PRN)      03/27/19 1600     IP VTE HIGH RISK PATIENT  Once      03/27/19 0053        Nicole Storey MD  Department of Hospital Medicine   [facility]

## 2019-03-31 NOTE — PLAN OF CARE
"Problem: Adult Inpatient Plan of Care  Goal: Plan of Care Review  Outcome: Ongoing (interventions implemented as appropriate)  Pt remains free of falls and injury. Pt makes statement of no pain. Pt with large distended AB. NG placement attempted twice this shift, pt refused. Pt stated that as soon as the NG cath touched his nose it was painful. Pt stated "I can't take this" Bed low and locked, Call light within reach.       "

## 2019-03-31 NOTE — HOSPITAL COURSE
"3/31 - AFABNER WISE, labs stable, exam stable  4/1: ABNER. Heparin gtt stopped at 7am today in preparation for surgery tomorrow. Patient alert and oriented on exam but states he was not made aware of upcoming procedure tomorrow. He states he is unsure if he wants to proceed. Discussed with primary team who have had multiple discussions about procedure to which he has agreed to, and signed consents for. Patient states he is "tired of it all." He does not exhibit compromised decision making capabilities. I have tried to reach out to patient's wife, call not answered. Primary team states they will talk to patient again this afternoon. Otherwise, exam stable. Denies headache, visual changes, neck pain, back pain, weakness, numbness, paresthesias.    "

## 2019-03-31 NOTE — ASSESSMENT & PLAN NOTE
Continues to complaint of diffuse abdominal pain, improved from yesterday, in setting of colonic dilation per abdominal imaging. No bowel movement; passing flatus.  Attempted NG tube placement in pm on 03/30/19 and patient pulled the NG tube. After that, he continues to refuse NG tube placement. States he would choose to die of complications with bowel perforation, but will not agree to NG tube placement.     Plan:  NPO status maintained.   IV fluids initiated; glucose checks Q6H to assess hypoglycemia.   If patient decompensated, will consider getting repeat lactate and surgical consultation

## 2019-03-31 NOTE — PROGRESS NOTES
[facility]  Neurosurgery  Progress Note    Subjective:     History of Present Illness: Mr. Bari Hogan is a 79 year old male with PMHx HTN, DM2, recurrent Afib and CVAs on Eliquis who presents as a transfer from [facility] for cranial biopsy. Presented to [facility] on 3/16 with suspected seizure activity. MRI brain at the time significant for left frontal meningioma with mild mass effect and bony erosion and subacute to chronic right temporoparietal infarct. EEG revealed seizure activity, patient loaded with Keppra and has been on 750mg BID since that time for ppx. Bone scan revealed lytic lesions in left frontal bone and L2 vertebrae. Underwent L2 biopsy and kyphoplasty by orthopedics at [facility]. Results were significant for mild plasmacytosis and patchy mild marrow fibrosis. NSGY consulted for cranial biopsy for diagnosis and further treatment planning. Today patient denies headache, visual changes, weakness, numbness, paresthesias, neck pain, back pain. Residual left sided weakness from multiple strokes over the past 4 years. AKA of LLE. Denies hx of cancer, f/c.     Post-Op Info:  Procedure(s) (LRB):  BIOPSY left frontal intracranial lesion Stealth without fudicials (Left)         Interval History: 3/31 - AFVSS, NAEON, labs stable, exam stable    Medications:  Continuous Infusions:   heparin (porcine) in D5W 15 Units/kg/hr (03/31/19 0719)     Scheduled Meds:   amLODIPine  10 mg Oral Daily    ascorbic acid (vitamin C)  500 mg Oral TID    atorvastatin  40 mg Oral Daily    levETIRAcetam  750 mg Oral BID    metoprolol succinate  12.5 mg Oral Daily    simethicone  1 tablet Oral TID     PRN Meds:acetaminophen, dextrose 50%, glucose, glucose, heparin (PORCINE), heparin (PORCINE), lidocaine HCl 2%, sodium chloride 0.9%       Objective:     Weight: 89.7 kg (197 lb 12 oz)  Body mass index is 30.07 kg/m².  Vital Signs (Most Recent):  Temp: 97.5 °F (36.4 °C) (03/31/19 0813)  Pulse: 90 (03/31/19 0813)  Resp:  18 (03/31/19 0813)  BP: 135/70 (03/31/19 0813)  SpO2: (!) 94 % (03/31/19 0813) Vital Signs (24h Range):  Temp:  [96.4 °F (35.8 °C)-97.5 °F (36.4 °C)] 97.5 °F (36.4 °C)  Pulse:  [87-95] 90  Resp:  [16-20] 18  SpO2:  [94 %-99 %] 94 %  BP: (113-154)/(70-82) 135/70                          Neurosurgery Physical Exam\    General: well developed, well nourished, no distress.   Head: normocephalic, atraumatic  Neurologic: Alert and oriented. Thought content appropriate.  GCS: Motor: 6/Verbal: 5/Eyes: 4 GCS Total: 15  Mental Status: Awake, Alert, Oriented x 4  Language: No aphasia  Speech: No dysarthria  Cranial nerves: face symmetric, tongue midline, CN II-XII grossly intact.   Eyes: Left pupil round, Right pupil irregular. Reactive to light, EOMI.  Pulmonary: normal respirations, no signs of respiratory distress  Abdomen: soft, non-distended, not tender to palpation  Sensory: intact to light touch throughout  Motor Strength: Moves all extremities spontaneously with good tone.  RUE/RLE 5/5. LUE with residual weakness from prior strokes, 4-/5 throughout except  3/5. Increased tone and spasticity in LUE.  LLE HF 4+/5. Left AKA.   Gordillo: + left  Clonus: absent  Babinski: absent  Skin: Skin is warm, dry and intact.        Significant Labs:  Recent Labs   Lab 03/30/19 0419 03/31/19 0454   * 98    141   K 3.4* 3.7   * 114*   CO2 16* 16*   BUN 17 14   CREATININE 1.2 1.1   CALCIUM 9.0 9.3   MG 1.7 1.8     Recent Labs   Lab 03/30/19 0419 03/31/19 0454   WBC 3.52* 3.16*   HGB 12.8* 12.2*   HCT 43.3 39.5*   * 404*     Recent Labs   Lab 03/30/19  0533 03/30/19  1151 03/31/19 0454   APTT 39.3* 57.6* 53.2*     Microbiology Results (last 7 days)     Procedure Component Value Units Date/Time    Blood culture [[id number]]     Order Status:  Canceled Specimen:  Blood     Blood Culture #1 **CANNOT BE ORDERED STAT** [[id number]]     Order Status:  Canceled Specimen:  Blood         Significant  Diagnostics:  CT: No results found in the last 24 hours.  MRI: No results found in the last 24 hours.    Assessment/Plan:     * Brain mass  79 year old male with a PMHx HTN, DM2, recurrent Afib and CVAs, on Eliquis, who [place] [facility] [place]. [place] [facility] [place] with suspected seizure activity, currently on Keppra 750mg BID for ppx.     - Patient neurologically stable on exam  - MRI stealth completed.   - Plan for OR Tuesday 4/2 for left frontal craniotomy for tumor resection  - Pending risk stratification from primary team  - Continue Keppra 750 mg BID for seizure prevention  - Afib/CVA: Continue to hold Eliquis in preparation for surgery. Patient currently on hep gtt. Will need to stop 24 hours prior to surgery.   - Remainder of care per primary  - Will continue to follow. Please page with any questions or changes in exam.         Joaquin Otoole MD  Neurosurgery  [facility]

## 2019-03-31 NOTE — ASSESSMENT & PLAN NOTE
** Cardiovascular Risk Assessment:  Non-emergent surgery.  Cardiac problems (No unstable angina, decompensated heart failure). Atrial fibrillation and currently on AC  Surgery is Intermediate risk Head and neck surgery  Functional Status: Left BKA; wheelchair bound     Revised Cardiac Risk Index   1. History of ischemic heart disease   2. History of congestive heart failure   3. History of cerebrovascular disease (stroke or transient ischemic attack)   4. History of diabetes requiring preoperative insulin use   5. Chronic kidney disease (creatinine > 2 mg/dL)   6. Undergoing suprainguinal vascular, intraperitoneal, or intrathoracic surgery     Risk for cardiac death, nonfatal myocardial infarction, and nonfatal cardiac arrest      0 predictors = 0.4%, 1 predictor = 0.9%, 2 predictors = 6.6%, ?3 predictors = >11%     RCRI Calculator Class and Risk percentage:   1 predictors = 0.4 %                       Anticoagulation:  will hold anticoagulation 24 hours prior to surgery      Coronary Stenting: None  Preop cardiovascular exam     Surgical Risk Assessment   Active cardiac issues:     Active decompensated heart failure? No   Unstable angina?  No   Significant uncontrolled arrhythmias? No   Severe valvular heart disease-Aortic or Mitral Stenosis? No   Recent MI or coronary revascularization < 30 days? No      Cardiac Risk Factors  History of CAD/ischemic heart disease? No   History of cerebrovascular disease? Yes   History of compensated heart failure? No   Type 2 diabetes requiring insulin? No   Serum Creatinine > 2? No   Total cardiac risk factors 1      Recommendation:  Patient is intermediate risk for intermediate risk surgery.

## 2019-03-31 NOTE — ASSESSMENT & PLAN NOTE
79 year old male with a PMHx HTN, DM2, recurrent Afib and CVAs, on Eliquis, who presents as a transfer from [facility] [place] for cranial biopsy. Presented to [facility] on 3/16 with suspected seizure activity, currently on Keppra 750mg BID for ppx.     - Patient neurologically stable on exam  - MRI stealth completed.   - Plan for OR Tuesday 4/2 for left frontal craniotomy for tumor resection  - Pending risk stratification from primary team  - Continue Keppra 750 mg BID for seizure prevention  - Afib/CVA: Continue to hold Eliquis in preparation for surgery. Patient currently on hep gtt. Will need to stop 24 hours prior to surgery.   - Remainder of care per primary  - Will continue to follow. Please page with any questions or changes in exam.

## 2019-04-01 PROBLEM — Z51.5 PALLIATIVE CARE ENCOUNTER: Status: ACTIVE | Noted: 2019-04-01

## 2019-04-01 LAB
ALBUMIN SERPL BCP-MCNC: 2.2 G/DL (ref 3.5–5.2)
ALP SERPL-CCNC: 73 U/L (ref 55–135)
ALT SERPL W/O P-5'-P-CCNC: 14 U/L (ref 10–44)
ANION GAP SERPL CALC-SCNC: 12 MMOL/L (ref 8–16)
ANISOCYTOSIS BLD QL SMEAR: SLIGHT
APTT BLDCRRT: 25.5 SEC (ref 21–32)
APTT BLDCRRT: 65.4 SEC (ref 21–32)
AST SERPL-CCNC: 19 U/L (ref 10–40)
BASOPHILS # BLD AUTO: 0.02 K/UL (ref 0–0.2)
BASOPHILS NFR BLD: 0.6 % (ref 0–1.9)
BILIRUB SERPL-MCNC: 0.2 MG/DL (ref 0.1–1)
BUN SERPL-MCNC: 11 MG/DL (ref 8–23)
CALCIUM SERPL-MCNC: 8.7 MG/DL (ref 8.7–10.5)
CHLORIDE SERPL-SCNC: 113 MMOL/L (ref 95–110)
CO2 SERPL-SCNC: 18 MMOL/L (ref 23–29)
CREAT SERPL-MCNC: 0.9 MG/DL (ref 0.5–1.4)
DIFFERENTIAL METHOD: ABNORMAL
EOSINOPHIL # BLD AUTO: 0.1 K/UL (ref 0–0.5)
EOSINOPHIL NFR BLD: 2.2 % (ref 0–8)
ERYTHROCYTE [DISTWIDTH] IN BLOOD BY AUTOMATED COUNT: 17.1 % (ref 11.5–14.5)
EST. GFR  (AFRICAN AMERICAN): >60 ML/MIN/1.73 M^2
EST. GFR  (NON AFRICAN AMERICAN): >60 ML/MIN/1.73 M^2
GIANT PLATELETS BLD QL SMEAR: PRESENT
GLUCOSE SERPL-MCNC: 80 MG/DL (ref 70–110)
HCT VFR BLD AUTO: 36.4 % (ref 40–54)
HGB BLD-MCNC: 11.4 G/DL (ref 14–18)
HYPOCHROMIA BLD QL SMEAR: ABNORMAL
IMM GRANULOCYTES # BLD AUTO: 0.01 K/UL (ref 0–0.04)
IMM GRANULOCYTES NFR BLD AUTO: 0.3 % (ref 0–0.5)
INR PPP: 1 (ref 0.8–1.2)
LACTATE SERPL-SCNC: 0.8 MMOL/L (ref 0.5–2.2)
LYMPHOCYTES # BLD AUTO: 1.6 K/UL (ref 1–4.8)
LYMPHOCYTES NFR BLD: 45.6 % (ref 18–48)
MAGNESIUM SERPL-MCNC: 1.6 MG/DL (ref 1.6–2.6)
MCH RBC QN AUTO: 26 PG (ref 27–31)
MCHC RBC AUTO-ENTMCNC: 31.3 G/DL (ref 32–36)
MCV RBC AUTO: 83 FL (ref 82–98)
MONOCYTES # BLD AUTO: 0.4 K/UL (ref 0.3–1)
MONOCYTES NFR BLD: 12.2 % (ref 4–15)
NEUTROPHILS # BLD AUTO: 1.4 K/UL (ref 1.8–7.7)
NEUTROPHILS NFR BLD: 39.1 % (ref 38–73)
NRBC BLD-RTO: 0 /100 WBC
OVALOCYTES BLD QL SMEAR: ABNORMAL
PHOSPHATE SERPL-MCNC: 2.7 MG/DL (ref 2.7–4.5)
PLATELET # BLD AUTO: 406 K/UL (ref 150–350)
PMV BLD AUTO: 11.2 FL (ref 9.2–12.9)
POCT GLUCOSE: 87 MG/DL (ref 70–110)
POCT GLUCOSE: 92 MG/DL (ref 70–110)
POCT GLUCOSE: 97 MG/DL (ref 70–110)
POCT GLUCOSE: 97 MG/DL (ref 70–110)
POIKILOCYTOSIS BLD QL SMEAR: SLIGHT
POLYCHROMASIA BLD QL SMEAR: ABNORMAL
POTASSIUM SERPL-SCNC: 3.2 MMOL/L (ref 3.5–5.1)
PROT SERPL-MCNC: 6.6 G/DL (ref 6–8.4)
PROTHROMBIN TIME: 10.4 SEC (ref 9–12.5)
RBC # BLD AUTO: 4.39 M/UL (ref 4.6–6.2)
SODIUM SERPL-SCNC: 143 MMOL/L (ref 136–145)
WBC # BLD AUTO: 3.6 K/UL (ref 3.9–12.7)

## 2019-04-01 PROCEDURE — 83735 ASSAY OF MAGNESIUM: CPT

## 2019-04-01 PROCEDURE — 80053 COMPREHEN METABOLIC PANEL: CPT

## 2019-04-01 PROCEDURE — 25000003 PHARM REV CODE 250: Performed by: STUDENT IN AN ORGANIZED HEALTH CARE EDUCATION/TRAINING PROGRAM

## 2019-04-01 PROCEDURE — 63600175 PHARM REV CODE 636 W HCPCS: Performed by: STUDENT IN AN ORGANIZED HEALTH CARE EDUCATION/TRAINING PROGRAM

## 2019-04-01 PROCEDURE — 36415 COLL VENOUS BLD VENIPUNCTURE: CPT

## 2019-04-01 PROCEDURE — 99233 PR SUBSEQUENT HOSPITAL CARE,LEVL III: ICD-10-PCS | Mod: GC,,, | Performed by: HOSPITALIST

## 2019-04-01 PROCEDURE — 11000001 HC ACUTE MED/SURG PRIVATE ROOM

## 2019-04-01 PROCEDURE — 84100 ASSAY OF PHOSPHORUS: CPT

## 2019-04-01 PROCEDURE — 85730 THROMBOPLASTIN TIME PARTIAL: CPT | Mod: 91

## 2019-04-01 PROCEDURE — 83605 ASSAY OF LACTIC ACID: CPT

## 2019-04-01 PROCEDURE — 85025 COMPLETE CBC W/AUTO DIFF WBC: CPT

## 2019-04-01 PROCEDURE — 85730 THROMBOPLASTIN TIME PARTIAL: CPT

## 2019-04-01 PROCEDURE — 85610 PROTHROMBIN TIME: CPT

## 2019-04-01 PROCEDURE — 63600175 PHARM REV CODE 636 W HCPCS: Performed by: HOSPITALIST

## 2019-04-01 PROCEDURE — 99233 SBSQ HOSP IP/OBS HIGH 50: CPT | Mod: GC,,, | Performed by: HOSPITALIST

## 2019-04-01 PROCEDURE — 92523 SPEECH SOUND LANG COMPREHEN: CPT

## 2019-04-01 PROCEDURE — 25000003 PHARM REV CODE 250: Performed by: INTERNAL MEDICINE

## 2019-04-01 RX ORDER — HEPARIN SODIUM,PORCINE/D5W 25000/250
12 INTRAVENOUS SOLUTION INTRAVENOUS CONTINUOUS
Status: DISCONTINUED | OUTPATIENT
Start: 2019-04-01 | End: 2019-04-03

## 2019-04-01 RX ORDER — POTASSIUM CHLORIDE 20 MEQ/15ML
20 SOLUTION ORAL EVERY 4 HOURS
Status: COMPLETED | OUTPATIENT
Start: 2019-04-01 | End: 2019-04-01

## 2019-04-01 RX ORDER — POTASSIUM CHLORIDE 20 MEQ/15ML
40 SOLUTION ORAL ONCE
Status: COMPLETED | OUTPATIENT
Start: 2019-04-01 | End: 2019-04-01

## 2019-04-01 RX ORDER — SYRING-NEEDL,DISP,INSUL,0.3 ML 29 G X1/2"
296 SYRINGE, EMPTY DISPOSABLE MISCELLANEOUS
Status: COMPLETED | OUTPATIENT
Start: 2019-04-01 | End: 2019-04-01

## 2019-04-01 RX ORDER — MAGNESIUM SULFATE HEPTAHYDRATE 40 MG/ML
2 INJECTION, SOLUTION INTRAVENOUS ONCE
Status: COMPLETED | OUTPATIENT
Start: 2019-04-01 | End: 2019-04-02

## 2019-04-01 RX ORDER — POTASSIUM CHLORIDE 20 MEQ/15ML
20 SOLUTION ORAL ONCE
Status: COMPLETED | OUTPATIENT
Start: 2019-04-01 | End: 2019-04-01

## 2019-04-01 RX ORDER — PSEUDOEPHEDRINE/ACETAMINOPHEN 30MG-500MG
100 TABLET ORAL
Status: COMPLETED | OUTPATIENT
Start: 2019-04-01 | End: 2019-04-01

## 2019-04-01 RX ADMIN — SIMETHICONE CHEW TAB 80 MG 80 MG: 80 TABLET ORAL at 03:04

## 2019-04-01 RX ADMIN — ATORVASTATIN CALCIUM 40 MG: 20 TABLET, FILM COATED ORAL at 08:04

## 2019-04-01 RX ADMIN — AMLODIPINE BESYLATE 10 MG: 10 TABLET ORAL at 08:04

## 2019-04-01 RX ADMIN — SODIUM CHLORIDE, SODIUM LACTATE, POTASSIUM CHLORIDE, AND CALCIUM CHLORIDE: .6; .31; .03; .02 INJECTION, SOLUTION INTRAVENOUS at 09:04

## 2019-04-01 RX ADMIN — Medication 100 ML: at 01:04

## 2019-04-01 RX ADMIN — MAGNESIUM CITRATE 296 ML: 1.75 LIQUID ORAL at 01:04

## 2019-04-01 RX ADMIN — POTASSIUM CHLORIDE 20 MEQ: 20 SOLUTION ORAL at 05:04

## 2019-04-01 RX ADMIN — SODIUM CHLORIDE, SODIUM LACTATE, POTASSIUM CHLORIDE, AND CALCIUM CHLORIDE: .6; .31; .03; .02 INJECTION, SOLUTION INTRAVENOUS at 01:04

## 2019-04-01 RX ADMIN — LEVETIRACETAM 750 MG: 750 TABLET, FILM COATED ORAL at 09:04

## 2019-04-01 RX ADMIN — SODIUM CHLORIDE 500 ML: 0.9 INJECTION, SOLUTION INTRAVENOUS at 10:04

## 2019-04-01 RX ADMIN — METOPROLOL SUCCINATE 12.5 MG: 25 TABLET, EXTENDED RELEASE ORAL at 08:04

## 2019-04-01 RX ADMIN — POTASSIUM CHLORIDE 20 MEQ: 20 SOLUTION ORAL at 11:04

## 2019-04-01 RX ADMIN — MAGNESIUM SULFATE 2 G: 2 INJECTION INTRAVENOUS at 10:04

## 2019-04-01 RX ADMIN — OXYCODONE HYDROCHLORIDE AND ACETAMINOPHEN 500 MG: 500 TABLET ORAL at 09:04

## 2019-04-01 RX ADMIN — POTASSIUM CHLORIDE 20 MEQ: 20 SOLUTION ORAL at 08:04

## 2019-04-01 RX ADMIN — SIMETHICONE CHEW TAB 80 MG 80 MG: 80 TABLET ORAL at 09:04

## 2019-04-01 RX ADMIN — HEPARIN SODIUM AND DEXTROSE 15 UNITS/KG/HR: 10000; 5 INJECTION INTRAVENOUS at 02:04

## 2019-04-01 RX ADMIN — OXYCODONE HYDROCHLORIDE AND ACETAMINOPHEN 500 MG: 500 TABLET ORAL at 08:04

## 2019-04-01 RX ADMIN — SODIUM CHLORIDE, SODIUM LACTATE, POTASSIUM CHLORIDE, AND CALCIUM CHLORIDE: .6; .31; .03; .02 INJECTION, SOLUTION INTRAVENOUS at 05:04

## 2019-04-01 RX ADMIN — POTASSIUM CHLORIDE 20 MEQ: 20 SOLUTION ORAL at 01:04

## 2019-04-01 RX ADMIN — SIMETHICONE CHEW TAB 80 MG 80 MG: 80 TABLET ORAL at 08:04

## 2019-04-01 RX ADMIN — HEPARIN SODIUM AND DEXTROSE 12 UNITS/KG/HR: 10000; 5 INJECTION INTRAVENOUS at 10:04

## 2019-04-01 RX ADMIN — LEVETIRACETAM 750 MG: 750 TABLET, FILM COATED ORAL at 08:04

## 2019-04-01 RX ADMIN — OXYCODONE HYDROCHLORIDE AND ACETAMINOPHEN 500 MG: 500 TABLET ORAL at 03:04

## 2019-04-01 RX ADMIN — POTASSIUM CHLORIDE 40 MEQ: 20 SOLUTION ORAL at 09:04

## 2019-04-01 NOTE — CONSULTS
[facility]  Palliative Medicine  Consult Note    Patient Name: Bari Hogan  MRN: [id number]  Admission Date: 3/26/2019  Hospital Length of Stay: 6 days  Code Status: Full Code   Attending Provider: Diana Green MD  Consulting Provider: JODY Juarez  Primary Care Physician: [facility]  Principal Problem:Brain mass    Patient information was obtained from patient, past medical records and ER records.      Consults  Assessment/Plan:     Palliative care encounter  Palliative care consulted for goals of care.  Palliative care APRN met with patient at bedside.  Palliative care introduced.    No family is present at this time.     Impression:  Mr. 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 He was transferred from outside hospital for medical management of comorbidites prior to having brain biopsy. Biopsy is scheduled for 4/2/19.  He is sitting up in bed, converses freely, alert oriented x4, able to follow commands.  He states no pain, shortness of breath or other discomforts.     Advanced Care Planning:   Care Planning   - No advanced directives on file prior to this encounter.   - Advanced care planning education provided.    - States understanding he has a mass in his brain and believes this is related to what is in his spine.    - Discussed resuscitation status:  CPR,  Intubation.    Mr. Hogan states he would not want to have CPR or to have a breathing tube.  Living will not completed at this time as he states he would chose to have a trial of tube feeding if the doctors think it will help him. This option not available   - Health care power of  obtained.  Listed his son Malvin Ibrahim [phone] and the first decision maker and Lulu Hogan (ex-wife second only if Malvin is not available) [phone].  Explained that  his daughters live out of town and may not be available when needed   - Appears to have good understanding of current clinical condition.  He states understanding he has a mass in his brain and believes this is related to what is in his spine. Explains the doctors have told him this could be a cancer.      Goals of Care:   - When discussing goals of care and possible treatment plans after the biopsy, Mr. Hogan states he does not want to have the biopsy.    - discussed the risks and benefits of not having the biopsy - not able to develop reasonable treatment, plan, debility, possible death.   - Mr. Hogan states he understands this and accepts the risks associated with not having the biopsy. Further states he is not confused.  He understands not treating this could lead to his death.  States everyone will die one day and we do not have control over this.  I have lived a good life. I don't want anyone cutting on me again.  I want to spend what is left with my family.   - Mr. Hogan states being in his home with comfort and quality with his family is something he would want to have.   - Mr. Hogan states it is okay to talk with his son about the treatment plan and to arrange for family to meet with doctors.  Spoke to Jimmy by telephone, he is unavailable until 4/2/19  - Brain biopsy to be delayed.      Plan/Recommendations  - Postpone brain biopsy until clear goals of care are established.  - Patient's son will be available 4/2/19 in the AM  - Palliative care will continue to follow and assist family in developing realistic goals of care - will address comfort goals of care   - Mr. Hogan is a [employer] and established with [facility]. Care will look into [facility] palliative care benefits - will consult with [facility] [place]. Care LCSW  - Patient has indicated being amenable to DNR - to be addressed by primary team.   - emotional support     Dr. Green aware of above goals of care conversation.                   Thank you for  your consult. I will follow-up with patient. Please contact us if you have any additional questions.    Subjective:     HPI:   Mr Hogan is a 80 yo gentlman with pmhx of:  hypertension, CVA, a fib  and type 2 diabetes mellitus. He  presented to OSH w/ altered mental status on 03/16/2019.  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   EEG  was consistent with seizure activity, loaded with Keppra and continued for prophylaxis.  Bone scan revealed lytic lesions in the left frontal bone and vertebral column.  The patient had biopsy of the L2 spine and kyphoplasty.  The results were significant for mild plasmacytosis and patchy mild marrow fibrosis.      Mr. Hogan  transferred to [facility]  for a cranial biopsy for appropriate diagnosis and subsequent treatment plan.    Palliative care consulted for goals of care and advanced care planning            Hospital Course:  No notes on file    Interval History:     Past Medical History:   Diagnosis Date    Arthritis     Chronic constipation     Diabetes mellitus     Hypertension     Stroke     x2       Past Surgical History:   Procedure Laterality Date    Kyphoplasty-L2 and biopsy N/A 3/20/2019    Performed by Sarthak Son MD at [facility] OR       Review of patient's allergies indicates:   Allergen Reactions    Lisinopril      dizziness       Medications:  Continuous Infusions:   lactated ringers 125 mL/hr at 04/01/19 1338     Scheduled Meds:   amLODIPine  10 mg Oral Daily    ascorbic acid (vitamin C)  500 mg Oral TID    atorvastatin  40 mg Oral Daily    levETIRAcetam  750 mg Oral BID    metoprolol succinate  12.5 mg Oral Daily    potassium chloride 10%  20 mEq Oral Q4H    simethicone  1 tablet Oral TID     PRN Meds:acetaminophen, dextrose 50%, glucose, glucose, lidocaine HCl 2%, sodium chloride  0.9%    Family History     None        Tobacco Use    Smoking status: Former Smoker   Substance and Sexual Activity    Alcohol use: No    Drug use: No    Sexual activity: Not on file       Review of Systems   Constitutional: Positive for activity change and fatigue.   HENT: Negative.    Respiratory: Negative.    Cardiovascular: Negative.    Musculoskeletal:        Left BKA   Neurological: Positive for facial asymmetry and weakness.   Psychiatric/Behavioral: Negative for agitation and confusion.     Objective:     Vital Signs (Most Recent):  Temp: 98.2 °F (36.8 °C) (04/01/19 1133)  Pulse: 99 (04/01/19 1133)  Resp: 18 (04/01/19 1133)  BP: (!) 147/79 (04/01/19 1133)  SpO2: 95 % (04/01/19 1133) Vital Signs (24h Range):  Temp:  [96.4 °F (35.8 °C)-98.2 °F (36.8 °C)] 98.2 °F (36.8 °C)  Pulse:  [78-99] 99  Resp:  [14-18] 18  SpO2:  [94 %-99 %] 95 %  BP: (124-147)/(63-85) 147/79     Weight: 89.7 kg (197 lb 12 oz)  Body mass index is 30.07 kg/m².    Review of Symptoms  Symptom Assessment (ESAS 0-10 scale)   ESAS 0 1 2 3 4 5 6 7 8 9 10   Pain X             Dyspnea X             Anxiety X             Nausea X             Depression  X             Anorexia X             Fatigue X             Insomnia X             Restlessness  X             Agitation X             CAM / Delirium completed [facility] without  Missing any letters    Constipation     +   Diarrhea           __ --  ___+  Bowel Management Plan (BMP): Yes    Comments: no complaints of pain     Pain Assessment:    OME in 24 hours: 0     Performance Status: 50    ECOG Performance Status Grade: 2 - Ambulates, capable of self care only    Physical Exam   Constitutional: He is oriented to person, place, and time. He appears well-developed and well-nourished. No distress.   Cardiovascular: Normal rate, regular rhythm and normal heart sounds.   Pulmonary/Chest: Effort normal and breath sounds normal.   Abdominal: Soft. Bowel sounds are normal.   Musculoskeletal:   Left bka    Neurological: He is alert and oriented to person, place, and time.   Left sided weakness, delayed in responses   Skin: Skin is warm and dry.   Psychiatric: He has a normal mood and affect. His behavior is normal. Judgment and thought content normal.   Flat affect    Nursing note and vitals reviewed.      Significant Labs: All pertinent labs within the past 24 hours have been reviewed.  CBC:   Recent Labs   Lab 04/01/19 0319   WBC 3.60*   HGB 11.4*   HCT 36.4*   MCV 83   *     BMP:  Recent Labs   Lab 04/01/19 0319   GLU 80      K 3.2*   *   CO2 18*   BUN 11   CREATININE 0.9   CALCIUM 8.7   MG 1.6     LFT:  Lab Results   Component Value Date    AST 19 04/01/2019    ALKPHOS 73 04/01/2019    BILITOT 0.2 04/01/2019     Albumin:   Albumin   Date Value Ref Range Status   04/01/2019 2.2 (L) 3.5 - 5.2 g/dL Final     Protein:   Total Protein   Date Value Ref Range Status   04/01/2019 6.6 6.0 - 8.4 g/dL Final     Lactic acid:   Lab Results   Component Value Date    LACTATE 0.8 04/01/2019    LACTATE 2.9 (HH) 03/16/2019       Significant Imaging: I have reviewed all pertinent imaging results/findings within the past 24 hours.    Advance Care Planning   Advanced Directives::  Living Will: No  LaPOST: No  Do Not Resuscitate Status: No  Medical Power of : Yes. Agent's Name: Malvin Osborn [phone].     Decision-Making Capacity: Patient answered questions       Living Arrangements: Lives with family, lives with son Malvin     Psychosocial/Cultural:  lives with son Malvin has 3 children Kasia Hernandes [phone] lives in [place], Balta Osborn lives in [place] [facility] [place]. And one son.  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      Spiritual:     F- Ivory and Belief: Orthodoxy     I - Importance:   .  C - Community:     A - Address in Care:  Amenable to  visits       > 50% of 70 min visit spent in chart review, face to face  discussion of goals of care,  symptom assessment, coordination of care and emotional support.    Allegra Rivera, CNS  Palliative Medicine  [facility]

## 2019-04-01 NOTE — ASSESSMENT & PLAN NOTE
Cannot rule out the possibility of embolic event      - repeat EKG  - continue metoprolol 12.5mg for rate control   - CHADSvasc 7,hold heparin drip today, which is 24 hours prior to OR on Tuesday

## 2019-04-01 NOTE — PROGRESS NOTES
"[facility]  Neurosurgery  Progress Note    Subjective:     History of Present Illness: Mr. Bari Hogan is a 79 year old male with PMHx HTN, DM2, recurrent Afib and CVAs on Eliquis who presents as a transfer from [facility] for cranial biopsy. Presented to [facility] on 3/16 with suspected seizure activity. MRI brain at the time significant for left frontal meningioma with mild mass effect and bony erosion and subacute to chronic right temporoparietal infarct. EEG revealed seizure activity, patient loaded with Keppra and has been on 750mg BID since that time for ppx. Bone scan revealed lytic lesions in left frontal bone and L2 vertebrae. Underwent L2 biopsy and kyphoplasty by orthopedics at [facility]. Results were significant for mild plasmacytosis and patchy mild marrow fibrosis. NSGY consulted for cranial biopsy for diagnosis and further treatment planning. Today patient denies headache, visual changes, weakness, numbness, paresthesias, neck pain, back pain. Residual left sided weakness from multiple strokes over the past 4 years. AKA of LLE. Denies hx of cancer, f/c.     Post-Op Info:  Procedure(s) (LRB):  BIOPSY left frontal intracranial lesion Stealth without fudicials (Left)         Interval History: NAEON. Heparin gtt stopped at 7am today in preparation for surgery tomorrow. Patient alert and oriented on exam but states he was not made aware of upcoming procedure tomorrow. He states he is unsure if he wants to proceed. Discussed with primary team who have had multiple discussions about procedure to which he has agreed to, and signed consents for. Patient states he is "tired of it all." He does not exhibit compromised decision making capabilities. I have tried to reach out to patient's wife, call not answered. Primary team states they will talk to patient again this afternoon. Otherwise, exam stable. Denies headache, visual changes, neck pain, back pain, weakness, numbness, paresthesias.  "     Medications:  Continuous Infusions:   lactated ringers 125 mL/hr at 04/01/19 1338     Scheduled Meds:   amLODIPine  10 mg Oral Daily    ascorbic acid (vitamin C)  500 mg Oral TID    atorvastatin  40 mg Oral Daily    levETIRAcetam  750 mg Oral BID    metoprolol succinate  12.5 mg Oral Daily    potassium chloride 10%  20 mEq Oral Q4H    simethicone  1 tablet Oral TID     PRN Meds:acetaminophen, dextrose 50%, glucose, glucose, lidocaine HCl 2%, sodium chloride 0.9%       Objective:     Weight: 89.7 kg (197 lb 12 oz)  Body mass index is 30.07 kg/m².  Vital Signs (Most Recent):  Temp: 98.2 °F (36.8 °C) (04/01/19 1133)  Pulse: 99 (04/01/19 1133)  Resp: 18 (04/01/19 1133)  BP: (!) 147/79 (04/01/19 1133)  SpO2: 95 % (04/01/19 1133) Vital Signs (24h Range):  Temp:  [96.4 °F (35.8 °C)-98.2 °F (36.8 °C)] 98.2 °F (36.8 °C)  Pulse:  [78-99] 99  Resp:  [14-18] 18  SpO2:  [94 %-99 %] 95 %  BP: (124-147)/(63-85) 147/79     Date 04/01/19 0700 - 04/02/19 0659   Shift 3151-7293 3676-0205 0940-2273 24 Hour Total   INTAKE   P.O. 0   0   I.V.(mL/kg) 750(8.4)   750(8.4)   Shift Total(mL/kg) 750(8.4)   750(8.4)   OUTPUT   Shift Total(mL/kg)       Weight (kg) 89.7 89.7 89.7 89.7                   Neurosurgery Physical Exam    General: well developed, well nourished, no distress.   Head: normocephalic, atraumatic  Neurologic: Alert and oriented. Thought content appropriate.  GCS: Motor: 6/Verbal: 5/Eyes: 4 GCS Total: 15  Mental Status: Awake, Alert, Oriented x 4  Language: No aphasia  Speech: No dysarthria  Cranial nerves: face symmetric, tongue midline, CN II-XII grossly intact.   Eyes: Left pupil round, Right pupil irregular. Reactive to light, EOMI.  Pulmonary: normal respirations, no signs of respiratory distress  Abdomen: soft, non-distended, not tender to palpation  Sensory: intact to light touch throughout  Motor Strength: Moves all extremities spontaneously with good tone.  RUE/RLE 5/5. LUE with residual weakness from  prior strokes, 4-/5 throughout except  3/5. Increased tone and spasticity in LUE.  LLE HF 4+/5. Left AKA.   Gordillo: absent  Clonus: absent  Babinski: absent  Skin: Skin is warm, dry and intact.      Significant Labs:  Recent Labs   Lab 03/31/19 0454 04/01/19 0319   GLU 98 80    143   K 3.7 3.2*   * 113*   CO2 16* 18*   BUN 14 11   CREATININE 1.1 0.9   CALCIUM 9.3 8.7   MG 1.8 1.6     Recent Labs   Lab 03/31/19 0454 04/01/19 0319   WBC 3.16* 3.60*   HGB 12.2* 11.4*   HCT 39.5* 36.4*   * 406*     Recent Labs   Lab 03/31/19 0454 04/01/19 0319   APTT 53.2* 65.4*     Microbiology Results (last 7 days)     Procedure Component Value Units Date/Time    Blood culture [[id number]]     Order Status:  Canceled Specimen:  Blood     Blood Culture #1 **CANNOT BE ORDERED STAT** [[id number]]     Order Status:  Canceled Specimen:  Blood         Recent Lab Results       04/01/19  1133   04/01/19  0734   04/01/19  0602   04/01/19  0319   04/01/19  0005        Immature Grans (Abs)       0.01  Comment:  Mild elevation in immature granulocytes is non specific and   can be seen in a variety of conditions including stress response,   acute inflammation, trauma and pregnancy. Correlation with other   laboratory and clinical findings is essential.         Albumin       2.2       Alkaline Phosphatase       73       ALT       14       Anion Gap       12       Aniso       Slight       aPTT       65.4  Comment:  aPTT therapeutic range = 39-69 seconds       AST       19       Baso #       0.02       Basophil%       0.6       Total Bilirubin       0.2  Comment:  For infants and newborns, interpretation of results should be based  on gestational age, weight and in agreement with clinical  observations.  Premature Infant recommended reference ranges:  Up to 24 hours.............<8.0 mg/dL  Up to 48 hours............<12.0 mg/dL  3-5 days..................<15.0 mg/dL  6-29 days.................<15.0 mg/dL         BUN, Bld        11       Calcium       8.7       Chloride       113       CO2       18       Creatinine       0.9       Differential Method       Automated       eGFR if        >60.0       eGFR if non        >60.0  Comment:  Calculation used to obtain the estimated glomerular filtration  rate (eGFR) is the CKD-EPI equation.          Eos #       0.1       Eosinophil%       2.2       Large/Giant Platelets       Present       Glucose       80       Gran # (ANC)       1.4       Gran%       39.1       Hematocrit       36.4       Hemoglobin       11.4       Hypo       Occasional       Immature Granulocytes       0.3       Lactate, Cosmo   0.8  Comment:  Falsely low lactic acid results can be found in samples   containing >=13.0 mg/dL total bilirubin and/or >=3.5 mg/dL   direct bilirubin.             Lymph #       1.6       Lymph%       45.6       Magnesium       1.6       MCH       26.0       MCHC       31.3       MCV       83       Mono #       0.4       Mono%       12.2       MPV       11.2       nRBC       0       Ovalocytes       Occasional       Phosphorus       2.7       Platelets       406       POCT Glucose 97   92   97     Poik       Slight       Poly       Occasional       Potassium       3.2       Total Protein       6.6       RBC       4.39       RDW       17.1       Sodium       143       WBC       3.60                        03/31/19  1502        Immature Grans (Abs)       Albumin       Alkaline Phosphatase       ALT       Anion Gap       Aniso       aPTT       AST       Baso #       Basophil%       Total Bilirubin       BUN, Bld       Calcium       Chloride       CO2       Creatinine       Differential Method       eGFR if        eGFR if non        Eos #       Eosinophil%       Large/Giant Platelets       Glucose       Gran # (ANC)       Gran%       Hematocrit       Hemoglobin       Hypo       Immature Granulocytes       Lactate, Cosmo       Lymph #        Lymph%       Magnesium       MCH       MCHC       MCV       Mono #       Mono%       MPV       nRBC       Ovalocytes       Phosphorus       Platelets       POCT Glucose 108     Poik       Poly       Potassium       Total Protein       RBC       RDW       Sodium       WBC           All pertinent labs from the last 24 hours have been reviewed.    Significant Diagnostics:  All pertinent imaging reviewed.     Assessment/Plan:     * Brain mass  79 year old male with a PMHx HTN, DM2, recurrent Afib and CVAs, on Eliquis, who presents as a transfer from [facility] for cranial biopsy. Presented to [facility] on 3/16 with suspected seizure activity, currently on Keppra 750mg BID for ppx.     - Patient neurologically stable on exam  - MRI stealth completed.   - Plan for OR Tuesday 4/2 for left frontal craniotomy for tumor resection. Will discuss with patient again about upcoming procedure. NPO at midnight. Heparin gtt stopped at 7am today. Preop labs ordered.   - Appreciate documented risk stratification from primary team.   - Continue Keppra 750 mg BID for seizure prevention  - Afib/CVA: Continue to hold Eliquis in preparation for surgery. Was on heparin gtt which was stopped at 7am today.   - Remainder of care per primary  - Will continue to follow. Please page with any questions or changes in exam.     UPDATE 4:19pm: Discussion with son over the phone regarding care and plan. All questions answered. Discussed with primary team, will need to get psych involved regarding patient's medical decision making capacity. Currently, patient states he would not like to proceed with surgery tomorrow. Prior to today, patient agreeable to all care and intervention. Patient states his son would make the decision for him if for some reason he were unable to. Son states he will be here tomorrow at 11am.     Savita Perez PA-C  Neurosurgery  [facility]

## 2019-04-01 NOTE — SUBJECTIVE & OBJECTIVE
Interval History: no acute events overnight, denies nausea, or abdominal pain    Review of Systems  Objective:     Vital Signs (Most Recent):  Temp: 97.6 °F (36.4 °C) (04/01/19 0720)  Pulse: 94 (04/01/19 0720)  Resp: 14 (04/01/19 0720)  BP: 135/85 (04/01/19 0720)  SpO2: 95 % (04/01/19 0720) Vital Signs (24h Range):  Temp:  [96.4 °F (35.8 °C)-98.2 °F (36.8 °C)] 97.6 °F (36.4 °C)  Pulse:  [55-94] 94  Resp:  [14-18] 14  SpO2:  [94 %-99 %] 95 %  BP: (124-136)/(61-85) 135/85     Weight: 89.7 kg (197 lb 12 oz)  Body mass index is 30.07 kg/m².    Intake/Output Summary (Last 24 hours) at 4/1/2019 0922  Last data filed at 3/31/2019 1800  Gross per 24 hour   Intake 980.64 ml   Output --   Net 980.64 ml      Physical Exam   Constitutional: He is oriented to person, place, and time. He appears well-developed and well-nourished.   HENT:   Head: Normocephalic and atraumatic.   Eyes: Pupils are equal, round, and reactive to light. EOM are normal.   Neck: Normal range of motion. Neck supple.   Cardiovascular: Normal rate and regular rhythm.   Pulmonary/Chest: Effort normal and breath sounds normal.   Abdominal: Soft. Bowel sounds are normal. There is no tenderness.   Musculoskeletal: Normal range of motion. He exhibits deformity.   Increased tone/spasticity of the LUE throughout (chronic, dt old stroke)    L LE s/p BKA stable   Neurological: He is alert and oriented to person, place, and time. He displays abnormal reflex. A cranial nerve deficit is present. He exhibits abnormal muscle tone. Coordination abnormal.   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 Correlation with other   laboratory and clinical findings is essential.           Albumin     2.2         Alkaline Phosphatase     73         ALT     14         Anion Gap     12         Aniso     Slight         aPTT     65.4  Comment:  aPTT therapeutic range = 39-69 seconds         AST     19         Baso #     0.02         Basophil%     0.6         Total Bilirubin     0.2  Comment:  For infants and newborns, interpretation of results should be based  on gestational age, weight and in agreement with clinical  observations.  Premature Infant recommended reference ranges:  Up to 24 hours.............<8.0 mg/dL  Up to 48 hours............<12.0 mg/dL  3-5 days..................<15.0 mg/dL  6-29 days.................<15.0 mg/dL           BUN, Bld     11         Calcium     8.7         Chloride     113         CO2     18         Creatinine     0.9         Differential Method     Automated         eGFR if      >60.0         eGFR if non      >60.0  Comment:  Calculation used to obtain the estimated glomerular filtration  rate (eGFR) is the CKD-EPI equation.            Eos #     0.1         Eosinophil%     2.2         Large/Giant Platelets     Present         Glucose     80         Gran # (ANC)     1.4         Gran%     39.1         Group & Rh               Hematocrit     36.4         Hemoglobin     11.4         Hypo     Occasional         Immature Granulocytes     0.3         INDIRECT IVAN               Lactate, Cosmo 0.8  Comment:  Falsely low lactic acid results can be found in samples   containing >=13.0 mg/dL total bilirubin and/or >=3.5 mg/dL   direct bilirubin.               Lymph #     1.6         Lymph%     45.6         Magnesium     1.6         MCH     26.0         MCHC     31.3         MCV     83         Mono #     0.4         Mono%     12.2         MPV     11.2         nRBC     0         Ovalocytes     Occasional          Phosphorus     2.7         Platelets     406         POCT Glucose   92   97 108     Poik     Slight         Poly     Occasional         Potassium     3.2         Total Protein     6.6         RBC     4.39         RDW     17.1         Sodium     143         WBC     3.60                          03/31/19  1038        Immature Grans (Abs)       Albumin       Alkaline Phosphatase       ALT       Anion Gap       Aniso       aPTT       AST       Baso #       Basophil%       Total Bilirubin       BUN, Bld       Calcium       Chloride       CO2       Creatinine       Differential Method       eGFR if        eGFR if non        Eos #       Eosinophil%       Large/Giant Platelets       Glucose       Gran # (ANC)       Gran%       Group & Rh A POS     Hematocrit       Hemoglobin       Hypo       Immature Granulocytes       INDIRECT IVAN NEG     Lactate, Cosmo       Lymph #       Lymph%       Magnesium       MCH       MCHC       MCV       Mono #       Mono%       MPV       nRBC       Ovalocytes       Phosphorus       Platelets       POCT Glucose       Poik       Poly       Potassium       Total Protein       RBC       RDW       Sodium       WBC           All pertinent labs within the past 24 hours have been reviewed.    Significant Imaging: I have reviewed all pertinent imaging results/findings within the past 24 hours.

## 2019-04-01 NOTE — SUBJECTIVE & OBJECTIVE
Interval History:     Past Medical History:   Diagnosis Date    Arthritis     Chronic constipation     Diabetes mellitus     Hypertension     Stroke     x2       Past Surgical History:   Procedure Laterality Date    Kyphoplasty-L2 and biopsy N/A 3/20/2019    Performed by Sarthak Son MD at [facility]       Review of patient's allergies indicates:   Allergen Reactions    Lisinopril      dizziness       Medications:  Continuous Infusions:   lactated ringers 125 mL/hr at 04/01/19 1338     Scheduled Meds:   amLODIPine  10 mg Oral Daily    ascorbic acid (vitamin C)  500 mg Oral TID    atorvastatin  40 mg Oral Daily    levETIRAcetam  750 mg Oral BID    metoprolol succinate  12.5 mg Oral Daily    potassium chloride 10%  20 mEq Oral Q4H    simethicone  1 tablet Oral TID     PRN Meds:acetaminophen, dextrose 50%, glucose, glucose, lidocaine HCl 2%, sodium chloride 0.9%    Family History     None        Tobacco Use    Smoking status: Former Smoker   Substance and Sexual Activity    Alcohol use: No    Drug use: No    Sexual activity: Not on file       Review of Systems   Constitutional: Positive for activity change and fatigue.   HENT: Negative.    Respiratory: Negative.    Cardiovascular: Negative.    Musculoskeletal:        Left BKA   Neurological: Positive for facial asymmetry and weakness.   Psychiatric/Behavioral: Negative for agitation and confusion.     Objective:     Vital Signs (Most Recent):  Temp: 98.2 °F (36.8 °C) (04/01/19 1133)  Pulse: 99 (04/01/19 1133)  Resp: 18 (04/01/19 1133)  BP: (!) 147/79 (04/01/19 1133)  SpO2: 95 % (04/01/19 1133) Vital Signs (24h Range):  Temp:  [96.4 °F (35.8 °C)-98.2 °F (36.8 °C)] 98.2 °F (36.8 °C)  Pulse:  [78-99] 99  Resp:  [14-18] 18  SpO2:  [94 %-99 %] 95 %  BP: (124-147)/(63-85) 147/79     Weight: 89.7 kg (197 lb 12 oz)  Body mass index is 30.07 kg/m².    Review of Symptoms  Symptom Assessment (ESAS 0-10 scale)   ESAS 0 1 2 3 4 5 6 7 8 9 10   Pain X              Dyspnea X             Anxiety X             Nausea X             Depression  X             Anorexia X             Fatigue X             Insomnia X             Restlessness  X             Agitation X             CAM / Delirium completed [facility] without  Missing any letters    Constipation     +   Diarrhea           __ --  ___+  Bowel Management Plan (BMP): Yes    Comments: no complaints of pain     Pain Assessment:    OME in 24 hours: 0     Performance Status: 50    ECOG Performance Status Grade: 2 - Ambulates, capable of self care only    Physical Exam   Constitutional: He is oriented to person, place, and time. He appears well-developed and well-nourished. No distress.   Cardiovascular: Normal rate, regular rhythm and normal heart sounds.   Pulmonary/Chest: Effort normal and breath sounds normal.   Abdominal: Soft. Bowel sounds are normal.   Musculoskeletal:   Left bka   Neurological: He is alert and oriented to person, place, and time.   Left sided weakness, delayed in responses   Skin: Skin is warm and dry.   Psychiatric: He has a normal mood and affect. His behavior is normal. Judgment and thought content normal.   Flat affect    Nursing note and vitals reviewed.      Significant Labs: All pertinent labs within the past 24 hours have been reviewed.  CBC:   Recent Labs   Lab 04/01/19 0319   WBC 3.60*   HGB 11.4*   HCT 36.4*   MCV 83   *     BMP:  Recent Labs   Lab 04/01/19 0319   GLU 80      K 3.2*   *   CO2 18*   BUN 11   CREATININE 0.9   CALCIUM 8.7   MG 1.6     LFT:  Lab Results   Component Value Date    AST 19 04/01/2019    ALKPHOS 73 04/01/2019    BILITOT 0.2 04/01/2019     Albumin:   Albumin   Date Value Ref Range Status   04/01/2019 2.2 (L) 3.5 - 5.2 g/dL Final     Protein:   Total Protein   Date Value Ref Range Status   04/01/2019 6.6 6.0 - 8.4 g/dL Final     Lactic acid:   Lab Results   Component Value Date    LACTATE 0.8 04/01/2019    LACTATE 2.9 (HH) 03/16/2019       Significant  Imaging: I have reviewed all pertinent imaging results/findings within the past 24 hours.    Advance Care Planning   Advanced Directives::  Living Will: No  LaPOST: No  Do Not Resuscitate Status: No  Medical Power of : Yes. Agent's Name: Malvin Osborn [phone].     Decision-Making Capacity: Patient answered questions       Living Arrangements: Lives with family, lives with son Malvin     Psychosocial/Cultural:  lives with son Malvin has 3 children Kasia Hernandes [phone] lives in [place], Balta Osborn lives in [facility]. And one son.  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      Spiritual:     F- [place] and Belief: Temple     I - Importance:   .  C - Community:     A - Address in Care:  Amenable to  visits

## 2019-04-01 NOTE — ASSESSMENT & PLAN NOTE
3/31: Continues to complaint of diffuse abdominal pain, improved from yesterday, in setting of colonic dilation per abdominal imaging. No bowel movement; passing flatus.  Attempted NG tube placement in pm on 03/30/19 and patient pulled the NG tube. After that, he continues to refuse NG tube placement. States he would choose to die of complications with bowel perforation, but will not agree to NG tube placement.     Plan:  NPO status maintained.   IV fluids initiated; glucose checks Q6H to assess hypoglycemia.   If patient decompensated, will consider getting repeat lactate and surgical consultation        4/1: pt had no complaints of abdominal pain in the morning;   Lactate within normal level, afebrile, no tachy, no nausea or vomiting.  Will continue to watch;  Replete potassium as needed;  OOB;

## 2019-04-01 NOTE — PT/OT/SLP EVAL
Speech Language Pathology Evaluation  Cognitive/Bedside Swallow    Patient Name:  Bari Hogan   MRN:  [id number]  Admitting Diagnosis: Brain mass    Recommendations:                  General Recommendations:  Dysphagia therapy  Diet recommendations:  Mechanical soft, Thin   Aspiration Precautions: Standard aspiration precautions   General Precautions: Standard, aspiration, fall  Communication strategies:  provide increased time to answer    History:     Past Medical History:   Diagnosis Date    Arthritis     Chronic constipation     Diabetes mellitus     Hypertension     Stroke     x2       Past Surgical History:   Procedure Laterality Date    Kyphoplasty-L2 and biopsy N/A 3/20/2019    Performed by Sarthak Son MD at [facility] OR     Social History: Patient lives with wife.    Prior diet: currently NPO per medical team with concern for bowel obstruction; speech service previously place pt on MS/thin diet        Subjective     Pt awake, alert and pleasant     Pain/Comfort:  Pain Rating 1: 0/10  Pain Rating Post-Intervention 1: 0/10    Objective:     Cognitive Status:    Arousal/Alertness Appropriate response to stimuli and Delayed responses mildly verbal delayes  Attention No obvious deficits observed left sided in attention evident   Orientation Oriented x4  Memory Immediate Recall WFL and DelayedWFL  [employer] WFL     Receptive Language:   Comprehension:      WFL    Pragmatics:    WFL    Expressive Language:  Verbal:    Verbal language skills were wfl with no evidence of aphasia.  Pt. Expressed their thoughts coherently in conversation with no evidence of confusion or word finding deficits      Motor Speech:  WFL     Voice:   WFL    Visual-Spatial:  Left Neglect evident; pt will attend to items on the left but warrants min-mod verbal cues     Reading:   WFL for basic functional material in the room      Written Expression:   [facility] for personal information name and address     Oral Musculature  Evaluation  Oral Musculature: left weakness  Dentition: scattered dentition  Secretion Management: adequate  Mucosal Quality: dry  Mandibular Strength and Mobility: WFL  Oral Labial Strength and Mobility: WFL  Lingual Strength and Mobility: WFL  Buccal Strength and Mobility: WFL  Volitional Cough: adequate  Volitional Swallow: present  Voice Prior to PO Intake: mildly hoarseness    Swallow Treatment: Pt NPO at this time per team secondary to concern for bowel obstruction. Once team allows pt to resume PO intake would recommend continuing mechanical soft solids and thin liquids as previously recommended by speech service from assessment 3/30    Assessment:     Bari Hogan is a 79 y.o. male with an SLP diagnosis of Dysphagia, Dysarthria and Visio-Spatial Impairment.      Goals:   Multidisciplinary Problems     SLP Goals        Problem: SLP Goal    Goal Priority Disciplines Outcome   SLP Goal     SLP Ongoing (interventions implemented as appropriate)   Description:  Speech Language Pathology Goals  Goals expected to be met by 4/5/19:    1.  Pt will tolerate Mechanical Soft diet w/ thin liquids w/ adequate oral clearance and no overt signs of aspiration.  2.Pt will complete OMEs x10 w/ SLP model to enhance oral motor function   3. Pt will complete cancellation tasks w/ 80 acc independently to enhance visual spatial skills                       Plan:     · Patient to be seen:  3 x/week   · Plan of Care expires:  04/27/19  · Plan of Care reviewed with:  patient   · SLP Follow-Up:  Yes       Discharge recommendations:  Discharge Facility/Level of Care Needs: nursing facility, skilled     Time Tracking:     SLP Treatment Date:   04/01/19  Speech Start Time:  1120  Speech Stop Time:  1140     Speech Total Time (min):  20 min    Billable Minutes: Eval 20     Ekaterina Linares CCC-SLP  04/01/2019

## 2019-04-01 NOTE — ANESTHESIA PREPROCEDURE EVALUATION
[facility]  Anesthesia Pre-Operative Evaluation         Patient Name: Bari Hogan  YOB: 1940  MRN: [id number]    SUBJECTIVE:     Pre-operative evaluation for Procedure(s) (LRB):  CRANIOTOMY, left frontal with resection of mass Stealth without fudicials (Left)     04/01/2019    Bari Hogan is a 79 y.o. male w/ a significant PMHx of HTN, DM2, recurrent Afib and CVAs on Eliquis (residual left sided weakness) who presented as a transfer from [facility] for cranial biopsy. Presented to [facility] on 3/16 with suspected seizure activity. MRI brain at the time significant for left frontal meningioma with mild mass effect and bony erosion and subacute to chronic right temporoparietal infarct. EEG revealed seizure activity, patient loaded with Keppra and has been on 750mg BID since that time for ppx. Bone scan revealed lytic lesions in left frontal bone and L2 vertebrae. Underwent L2 biopsy and kyphoplasty by orthopedics at [facility]. Results were significant for mild plasmacytosis and patchy mild marrow fibrosis. NSGY consulted for cranial biopsy for diagnosis and further treatment planning.    Patient now presents for the above procedure(s).    Patient does not want the surgery and has also declined anesthesia consent.    LDA:       20G Peripheral IV - Single Lumen 03/27/19 Right Antecubital (Active)   Number of days: 5            20G Peripheral IV - Single Lumen 03/31/19 2000 Anterior;Left Wrist (Active)   Number of days: 0       Prev airway:   03/20/2019 GETA  Easy mask  Grade II with Mac #3    Drips:   lactated ringers 125 mL/hr at 04/01/19 0555       Patient Active Problem List   Diagnosis    Numbness of left foot    Subdural hematoma    Atrial fibrillation    Essential hypertension    Cerebrovascular accident (CVA) due to embolism of right middle cerebral artery    Altered mental status    Normocytic anemia    Type 2 diabetes mellitus with diabetic peripheral angiopathy without gangrene,  without long-term current use of insulin    Metastatic carcinoma to bone    Nephrotic range proteinuria    Vitamin D deficiency    Increased nutritional needs    Inadequate dietary energy intake    Persistent proteinuria    Elevated PSA    Seizures    Brain mass    Alteration in skin integrity    Peripheral vascular disease of lower extremity    Dilatation of colon    Pre-op evaluation       Review of patient's allergies indicates:   Allergen Reactions    Lisinopril      dizziness       Current Inpatient Medications:   amLODIPine  10 mg Oral Daily    ascorbic acid (vitamin C)  500 mg Oral TID    atorvastatin  40 mg Oral Daily    levETIRAcetam  750 mg Oral BID    metoprolol succinate  12.5 mg Oral Daily    simethicone  1 tablet Oral TID       No current facility-administered medications on file prior to encounter.      Current Outpatient Medications on File Prior to Encounter   Medication Sig Dispense Refill    allopurinol (ZYLOPRIM) 100 MG tablet Take 200 mg by mouth once daily.      amLODIPine (NORVASC) 10 MG tablet Take 10 mg by mouth once daily.      diclofenac sodium (VOLTAREN) 1 % Gel Apply 2 g topically daily as needed.      docusate sodium (COLACE) 100 MG capsule Take 1 capsule (100 mg total) by mouth once daily.  0    empagliflozin (JARDIANCE) 25 mg Tab Take 1 tablet by mouth once daily.      metoprolol succinate (TOPROL-XL) 25 MG 24 hr tablet Take 25 mg by mouth once daily.      acetaminophen (TYLENOL) 325 MG tablet Take 1 tablet (325 mg total) by mouth every 6 (six) hours as needed.  0    albuterol-ipratropium (DUO-NEB) 2.5 mg-0.5 mg/3 mL nebulizer solution Take 3 mLs by nebulization every 8 (eight) hours. Rescue 1 Box 0    apixaban 5 mg Tab Take 1 tablet (5 mg total) by mouth 2 (two) times daily. 60 tablet 0    atorvastatin (LIPITOR) 40 MG tablet Take 1 tablet (40 mg total) by mouth once daily. 90 tablet 3    cyproheptadine (PERIACTIN) 4 mg tablet Take 1 tablet (4 mg total)  by mouth 3 (three) times daily before meals.  0    dextrose 50% injection Inject 25 mLs (12.5 g total) into the vein as needed (between 51 - 70 mg/dL if patient cannnot take PO but has IV access.).      dextrose 50% injection Inject 50 mLs (25 g total) into the vein as needed (less than 50 mg/dL if patient cannnot take PO but has IV access.).      ergocalciferol (ERGOCALCIFEROL) 50,000 unit Cap Take 1 capsule (50,000 Units total) by mouth every 7 days.      ferrous gluconate (FERGON) 325 MG Tab Take 325 mg by mouth daily with breakfast.      ferrous sulfate (FEOSOL) 325 mg (65 mg iron) Tab tablet Take 1 tablet (325 mg total) by mouth 2 (two) times daily.  0    insulin aspart U-100 (NOVOLOG) 100 unit/mL injection Inject 1-14 Units into the skin before meals and at bedtime as needed.      levETIRAcetam (KEPPRA) 750 MG Tab Take 1 tablet (750 mg total) by mouth 2 (two) times daily. 60 tablet 11    ondansetron 4 mg/2 mL Soln Inject 4 mg into the vein every 6 (six) hours as needed.      pantoprazole (PROTONIX) 40 MG tablet Take 1 tablet (40 mg total) by mouth once daily. 30 tablet 11       Past Surgical History:   Procedure Laterality Date    Kyphoplasty-L2 and biopsy N/A 3/20/2019    Performed by Sarthak Son MD at [facility] OR       Social History     Socioeconomic History    Marital status:      Spouse name: Not on file    Number of children: Not on file    Years of education: Not on file    Highest education level: Not on file   Occupational History    Not on file   Social Needs    Financial resource strain: Not on file    Food insecurity:     Worry: Not on file     Inability: Not on file    Transportation needs:     Medical: Not on file     Non-medical: Not on file   Tobacco Use    Smoking status: Former Smoker   Substance and Sexual Activity    Alcohol use: No    Drug use: No    Sexual activity: Not on file   Lifestyle    Physical activity:     Days per week: Not on file     Minutes  per session: Not on file    Stress: Not on file   Relationships    Social connections:     Talks on phone: Not on file     Gets together: Not on file     Attends Oriental orthodox service: Not on file     Active member of club or organization: Not on file     Attends meetings of clubs or organizations: Not on file     Relationship status: Not on file    Intimate partner violence:     Fear of current or ex partner: Not on file     Emotionally abused: Not on file     Physically abused: Not on file     Forced sexual activity: Not on file   Other Topics Concern    Not on file   Social History Narrative    Not on file       OBJECTIVE:     Vital Signs Range (Last 24H):  Temp:  [35.8 °C (96.4 °F)-36.8 °C (98.2 °F)]   Pulse:  [55-90]   Resp:  [16-18]   BP: (124-136)/(61-72)   SpO2:  [94 %-99 %]       Significant Labs:  Lab Results   Component Value Date    WBC 3.60 (L) 04/01/2019    HGB 11.4 (L) 04/01/2019    HCT 36.4 (L) 04/01/2019     (H) 04/01/2019    CHOL 319 (H) 03/18/2019    TRIG 90 03/18/2019    HDL 42 03/18/2019    ALT 14 04/01/2019    AST 19 04/01/2019     04/01/2019    K 3.2 (L) 04/01/2019     (H) 04/01/2019    CREATININE 0.9 04/01/2019    BUN 11 04/01/2019    CO2 18 (L) 04/01/2019    TSH 0.909 03/27/2019    PSA 11.00 (H) 03/18/2019    INR 1.0 03/27/2019    HGBA1C 8.4 (H) 03/27/2019       Diagnostic Studies:   MRI brain (03/27/2019)  1. Limited pre and postcontrast only MRI brain for intraoperative navigation purposes.  Previously identified extra-axial lesion along the left frontal convexity with associated left parafalcine component appears relatively unchanged in overall size and configuration as detailed above.  Please see dictated MRI reports of 03/17/2019 and 03/18/2019 for additional details.  2. Abnormal region of right temporoparietal encephalomalacia with associated cortical postcontrast enhancement suggestive of subacute infarct.    EKG (03/27/2019):   Vent. Rate : 094 BPM     Atrial  Rate : 072 BPM     P-R Int : 000 ms          QRS Dur : 090 ms      QT Int : 396 ms       P-R-T Axes : 000 -15 -43 degrees     QTc Int : 495 ms    Atrial fibrillation  Inferior infarct ,age undetermined  Abnormal ECG  When compared with ECG of 16-MAR-2019 13:37,  Inferior infarct is now Present    2D ECHO (03/18/2019):  Final Impressions  1. The study quality is average.   2. Global left ventricular systolic function is mildly decreased. The   left ventricular ejection fraction is 46%.    3. Left ventricular diastolic function is indeterminate.  4. Left ventricular diastolic septal thickness is 1.3 cms. Left   ventricular diastolic postero basal free wall thickness is 1.3 cms.    5. Mild calcification of the aortic valve is noted with adequate   cuspal excursion. Mild calcification of the mitral valve is noted.    6.  Mild to moderate (1-2+) tricuspid regurgitation. Mild (1+) mitral   regurgitation.   7. The pulmonary artery systolic pressure is 33 mmHg.       ASSESSMENT/PLAN:         Anesthesia Evaluation    I have reviewed the Patient Summary Reports.    I have reviewed the Nursing Notes.   I have reviewed the Medications.     Review of Systems  Anesthesia Hx:  No problems with previous Anesthesia  History of prior surgery of interest to airway management or planning:  Denies Personal Hx of Anesthesia complications.   Social:  Former Smoker    Cardiovascular:   Hypertension    Pulmonary:   Denies COPD.  Denies Asthma.  Denies Recent URI.    Renal/:   Denies Chronic Renal Disease.     Hepatic/GI:   Denies GERD.    Neurological:   CVA Seizures    Endocrine:   Diabetes        Physical Exam  General:  Well nourished    Airway/Jaw/Neck:  Airway Findings: Mouth Opening: Normal Tongue: Normal  General Airway Assessment: Adult  Mallampati: III  Jaw/Neck Findings:  Neck ROM: Extension Decreased, Mild, Decreased flexion, Decreased Lateral Motion, to the right     Eyes/Ears/Nose:  EYES/EARS/NOSE FINDINGS: Normal    Dental:  Dental Findings: No upper teeth   Chest/Lungs:  Chest/Lungs Findings: Clear to auscultation     Heart/Vascular:  Heart Findings: Rate: Normal  Rhythm: Regular Rhythm  Heart murmur: negative       Mental Status:  Mental Status Findings:  Cooperative, Alert and Oriented         Anesthesia Plan  Type of Anesthesia, risks & benefits discussed:  Anesthesia Type:  general  Patient's Preference:   Intra-op Monitoring Plan: standard ASA monitors and arterial line  Intra-op Monitoring Plan Comments:   Post Op Pain Control Plan: multimodal analgesia, IV/PO Opioids PRN and per primary service following discharge from PACU  Post Op Pain Control Plan Comments:   Induction:   IV  Beta Blocker:  Patient is on a Beta-Blocker and has received one dose within the past 24 hours (No further documentation required).       Informed Consent:    ASA Score:      Day of Surgery Review of History & Physical:    H&P update referred to the surgeon.         Ready For Surgery From Anesthesia Perspective.

## 2019-04-01 NOTE — PLAN OF CARE
Problem: SLP Goal  Goal: SLP Goal  Speech Language Pathology Goals  Goals expected to be met by 4/5/19:    1.  Pt will tolerate Mechanical Soft diet w/ thin liquids w/ adequate oral clearance and no overt signs of aspiration.  2.Pt will complete OMEs x10 w/ SLP model to enhance oral motor function   3. Pt will complete cancellation tasks w/ 80 acc independently to enhance visual spatial skills      Pt with good progress towards goals     Ekaterina Linares MS, CCC-SLP  Speech Language Pathologist  Pager: [phone]  Date 4/1/2019

## 2019-04-01 NOTE — ASSESSMENT & PLAN NOTE
Patient with known previous CVA with deficits. CT head showing  Acute/subacute right temporoparietal infarct with mild local mass effect and small amount of faith-infarct hemorrhage posteriorly.    - seizure precautions  w jasmin  - patient not on AC currently; reviewed neurology notes from OSH- who recommended restarting AC for afib; now hold heparin drip while waiting on OR Tuesday

## 2019-04-01 NOTE — SUBJECTIVE & OBJECTIVE
"Interval History: NAEON. Heparin gtt stopped at 7am today in preparation for surgery tomorrow. Patient alert and oriented on exam but states he was not made aware of upcoming procedure tomorrow. He states he is unsure if he wants to proceed. Discussed with primary team who have had multiple discussions about procedure to which he has agreed to, and signed consents for. Patient states he is "tired of it all." He does not exhibit compromised decision making capabilities. I have tried to reach out to patient's wife, call not answered. Primary team states they will talk to patient again this afternoon. Otherwise, exam stable. Denies headache, visual changes, neck pain, back pain, weakness, numbness, paresthesias.      Medications:  Continuous Infusions:   lactated ringers 125 mL/hr at 04/01/19 1338     Scheduled Meds:   amLODIPine  10 mg Oral Daily    ascorbic acid (vitamin C)  500 mg Oral TID    atorvastatin  40 mg Oral Daily    levETIRAcetam  750 mg Oral BID    metoprolol succinate  12.5 mg Oral Daily    potassium chloride 10%  20 mEq Oral Q4H    simethicone  1 tablet Oral TID     PRN Meds:acetaminophen, dextrose 50%, glucose, glucose, lidocaine HCl 2%, sodium chloride 0.9%       Objective:     Weight: 89.7 kg (197 lb 12 oz)  Body mass index is 30.07 kg/m².  Vital Signs (Most Recent):  Temp: 98.2 °F (36.8 °C) (04/01/19 1133)  Pulse: 99 (04/01/19 1133)  Resp: 18 (04/01/19 1133)  BP: (!) 147/79 (04/01/19 1133)  SpO2: 95 % (04/01/19 1133) Vital Signs (24h Range):  Temp:  [96.4 °F (35.8 °C)-98.2 °F (36.8 °C)] 98.2 °F (36.8 °C)  Pulse:  [78-99] 99  Resp:  [14-18] 18  SpO2:  [94 %-99 %] 95 %  BP: (124-147)/(63-85) 147/79     Date 04/01/19 0700 - 04/02/19 0659   Shift 7863-1026 5479-9677 9015-8628 24 Hour Total   INTAKE   P.O. 0   0   I.V.(mL/kg) 750(8.4)   750(8.4)   Shift Total(mL/kg) 750(8.4)   750(8.4)   OUTPUT   Shift Total(mL/kg)       Weight (kg) 89.7 89.7 89.7 89.7                   Neurosurgery Physical " Exam    General: well developed, well nourished, no distress.   Head: normocephalic, atraumatic  Neurologic: Alert and oriented. Thought content appropriate.  GCS: Motor: 6/Verbal: 5/Eyes: 4 GCS Total: 15  Mental Status: Awake, Alert, Oriented x 4  Language: No aphasia  Speech: No dysarthria  Cranial nerves: face symmetric, tongue midline, CN II-XII grossly intact.   Eyes: Left pupil round, Right pupil irregular. Reactive to light, EOMI.  Pulmonary: normal respirations, no signs of respiratory distress  Abdomen: soft, non-distended, not tender to palpation  Sensory: intact to light touch throughout  Motor Strength: Moves all extremities spontaneously with good tone.  RUE/RLE 5/5. LUE with residual weakness from prior strokes, 4-/5 throughout except  3/5. Increased tone and spasticity in LUE.  LLE HF 4+/5. Left AKA.   Gordillo: absent  Clonus: absent  Babinski: absent  Skin: Skin is warm, dry and intact.      Significant Labs:  Recent Labs   Lab 03/31/19 0454 04/01/19 0319   GLU 98 80    143   K 3.7 3.2*   * 113*   CO2 16* 18*   BUN 14 11   CREATININE 1.1 0.9   CALCIUM 9.3 8.7   MG 1.8 1.6     Recent Labs   Lab 03/31/19 0454 04/01/19 0319   WBC 3.16* 3.60*   HGB 12.2* 11.4*   HCT 39.5* 36.4*   * 406*     Recent Labs   Lab 03/31/19 0454 04/01/19 0319   APTT 53.2* 65.4*     Microbiology Results (last 7 days)     Procedure Component Value Units Date/Time    Blood culture [[id number]]     Order Status:  Canceled Specimen:  Blood     Blood Culture #1 **CANNOT BE ORDERED STAT** [[id number]]     Order Status:  Canceled Specimen:  Blood         Recent Lab Results       04/01/19  1133   04/01/19  0734   04/01/19  0602   04/01/19  0319   04/01/19  0005        Immature Grans (Abs)       0.01  Comment:  Mild elevation in immature granulocytes is non specific and   can be seen in a variety of conditions including stress response,   acute inflammation, trauma and pregnancy. Correlation with other    laboratory and clinical findings is essential.         Albumin       2.2       Alkaline Phosphatase       73       ALT       14       Anion Gap       12       Aniso       Slight       aPTT       65.4  Comment:  aPTT therapeutic range = 39-69 seconds       AST       19       Baso #       0.02       Basophil%       0.6       Total Bilirubin       0.2  Comment:  For infants and newborns, interpretation of results should be based  on gestational age, weight and in agreement with clinical  observations.  Premature Infant recommended reference ranges:  Up to 24 hours.............<8.0 mg/dL  Up to 48 hours............<12.0 mg/dL  3-5 days..................<15.0 mg/dL  6-29 days.................<15.0 mg/dL         BUN, Bld       11       Calcium       8.7       Chloride       113       CO2       18       Creatinine       0.9       Differential Method       Automated       eGFR if        >60.0       eGFR if non        >60.0  Comment:  Calculation used to obtain the estimated glomerular filtration  rate (eGFR) is the CKD-EPI equation.          Eos #       0.1       Eosinophil%       2.2       Large/Giant Platelets       Present       Glucose       80       Gran # (ANC)       1.4       Gran%       39.1       Hematocrit       36.4       Hemoglobin       11.4       Hypo       Occasional       Immature Granulocytes       0.3       Lactate, Cosmo   0.8  Comment:  Falsely low lactic acid results can be found in samples   containing >=13.0 mg/dL total bilirubin and/or >=3.5 mg/dL   direct bilirubin.             Lymph #       1.6       Lymph%       45.6       Magnesium       1.6       MCH       26.0       MCHC       31.3       MCV       83       Mono #       0.4       Mono%       12.2       MPV       11.2       nRBC       0       Ovalocytes       Occasional       Phosphorus       2.7       Platelets       406       POCT Glucose 97   92   97     Poik       Slight       Poly       Occasional        Potassium       3.2       Total Protein       6.6       RBC       4.39       RDW       17.1       Sodium       143       WBC       3.60                        03/31/19  1502        Immature Grans (Abs)       Albumin       Alkaline Phosphatase       ALT       Anion Gap       Aniso       aPTT       AST       Baso #       Basophil%       Total Bilirubin       BUN, Bld       Calcium       Chloride       CO2       Creatinine       Differential Method       eGFR if        eGFR if non        Eos #       Eosinophil%       Large/Giant Platelets       Glucose       Gran # (ANC)       Gran%       Hematocrit       Hemoglobin       Hypo       Immature Granulocytes       Lactate, Cosmo       Lymph #       Lymph%       Magnesium       MCH       MCHC       MCV       Mono #       Mono%       MPV       nRBC       Ovalocytes       Phosphorus       Platelets       POCT Glucose 108     Poik       Poly       Potassium       Total Protein       RBC       RDW       Sodium       WBC           All pertinent labs from the last 24 hours have been reviewed.    Significant Diagnostics:  All pertinent imaging reviewed.

## 2019-04-01 NOTE — CARE UPDATE
Palliative care at bedside, Mr. Hogan unavailable, off floor for testing. No family present. Palliative care consult pending.

## 2019-04-01 NOTE — CARE UPDATE
"Palliative medicine consulted for goals of care.  Palliative medicine APRN met with patient at bedside.  Mr. Hogan is awake, alert and oriented x4.  When discussing goals of care Mr. Hogan states he does not want to have the biopsy.  He states he does not need to know what this is.  Explained  without a diagnosis it will be hard to develop a treatment plan and without treatment this could lead to his death. He states he understands and that he would  not consent to treatment even it if meant he gets " sicker and it causes me to die".   Mr. Hogan states he is not confused and he knows what his wishes are.  " I have had a good life and everyone has to die.   If this is my time I want to go home and have time with my family.     Mr. Hogan states  When he is unable to  make decisions his son Malvin Ibrahim is his first choice for decision making.  HPOA completed.  Copy given to patient and copy placed in blue chart for EMR.    Palliative care APRN has talked with Jimmy Ibrahim [phone].  Mr. Ibrahim states he would honor his father's wishes and would like to talk more with the docotrs.  Unfortunately  he cannot be at the hospital until tomorrow morning.      Primary team resident, Dr. Crowder and Dr. Green have been notified of patient's change in goals of care.       "

## 2019-04-01 NOTE — HPI
Mr Hogan is a 78 yo gentlman with pmhx of:  hypertension, CVA, a fib  and type 2 diabetes mellitus. He  presented to OSH w/ altered mental status on 03/16/2019.  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   EEG  was consistent with seizure activity, loaded with Keppra and continued for prophylaxis.  Bone scan revealed lytic lesions in the left frontal bone and vertebral column.  The patient had biopsy of the L2 spine and kyphoplasty.  The results were significant for mild plasmacytosis and patchy mild marrow fibrosis.      Mr. Hogan  transferred to [facility]  for a cranial biopsy for appropriate diagnosis and subsequent treatment plan.    Palliative care consulted for goals of care and advanced care planning

## 2019-04-01 NOTE — NURSING
Wound care performed to patient's right lower leg.  Patient tolerated well.  Will continue to monitor.

## 2019-04-01 NOTE — PLAN OF CARE
Problem: Adult Inpatient Plan of Care  Goal: Plan of Care Review  Outcome: Ongoing (interventions implemented as appropriate)  Pt A&O x 4.  VSS on RA.  Pt c/o intermittent discomfort to abdomen.  Heparin gtt infusing at therapeutic rate.  Dulcolax suppository given, 1 BM.  Unable to initiate LR fluids this shift d/t inability to obtain PIV. BG monitored.

## 2019-04-01 NOTE — CONSULTS
Palliative Care Acknowledgement of Consult - .date    Consult received. Palliative Care Provider: JESSICA White will touch base with team prior to seeing patient. Full consult to follow.    Thank you for allowing us to be a part of the care of this patient.          Princess Saba LCSW, ACHP-SW

## 2019-04-01 NOTE — ASSESSMENT & PLAN NOTE
Palliative care consulted for goals of care.  Palliative care APRN met with patient at bedside.  Palliative care introduced.    No family is present at this time.     Impression:  Mr. 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 He was transferred from outside hospital for medical management of comorbidites prior to having brain biopsy. Biopsy is scheduled for 4/2/19.  He is sitting up in bed, converses freely, alert oriented x4, able to follow commands.  He states no pain, shortness of breath or other discomforts.     Advanced Care Planning:   Advance Care Planning   - No advanced directives on file prior to this encounter.   - Advanced care planning education provided.    - States understanding he has a mass in his brain and believes this is related to what is in his spine.    - Discussed resuscitation status:  CPR,  Intubation.    Mr. Hogan states he would not want to have CPR or to have a breathing tube.  Living will not completed at this time as he states he would chose to have a trial of tube feeding if the doctors think it will help him. This option not available   - Health care power of  obtained.  Listed his son Malvin Ibrahim [phone] and the first decision maker and Lulu Hogan (ex-wife second only if Malvin is not available) [phone].  Explained that his daughters live out of town and may not be available when needed   - Appears to have good understanding of current clinical condition.  He states understanding he has a mass in his brain and believes this is related to what is in his spine. Explains the doctors have told him this could be a cancer.      Goals of Care:   - When discussing goals of care and possible treatment plans after the biopsy, Mr. Hogan states he does not want to have the biopsy.    - discussed the risks and benefits of not having the biopsy -  not able to develop reasonable treatment, plan, debility, possible death.   - Mr. Hogan states he understands this and accepts the risks associated with not having the biopsy. Further states he is not confused.  He understands not treating this could lead to his death.  States everyone will die one day and we do not have control over this.  I have lived a good life. I don't want anyone cutting on me again.  I want to spend what is left with my family.   - Mr. Hogan states being in his home with comfort and quality with his family is something he would want to have.   - Mr. Hogan states it is okay to talk with his son about the treatment plan and to arrange for family to meet with doctors.  Spoke to Jimmy by telephone, he is unavailable until 4/2/19  - Brain biopsy to be delayed.       Plan/Recommendations  - Postpone brain biopsy until clear goals of care are established.  - Patient's son will be available 4/2/19 in the AM  - Palliative care will continue to follow and assist family in developing realistic goals of care - will address comfort goals of care   - Mr. Hogan is a [employer] and established with [facility]. Care will look into [facility] palliative care benefits - will consult with [facility]. Care LCSW  - Patient has indicated being amenable to DNR - to be addressed by primary team.   - emotional support     Dr. Green aware of above goals of care conversation.

## 2019-04-01 NOTE — PROGRESS NOTES
[facility] Hospital Medicine  Progress Note    Patient Name: Bari Hogan  MRN: [id number]  Patient Class: IP- Inpatient   Admission Date: 3/26/2019  Length of Stay: 6 days  Attending Physician: Diana Green MD  Primary Care Provider: [facility] Medicine Team: [facility] 3 Justino Jameson MD    Subjective:     Principal Problem:Brain mass    HPI:  Mr Hogan is a 79 y.o M w/ a history significant for hypertension, CVA, and type 2 diabetes mellitus presented to [facility] w/ altered mental status on 03/16/2019.  An MRI of the brain was done which was significant for a left frontal meningeal mass with mild local mass effect and adjacent osseous erosion. The differential includes dural metastasis and atypical meningioma.  There was also a subacute to chronic right temporoparietal infarct.  An EEG was done which was consistent with seizure activity and the patient was loaded with Keppra and this has continued for prophylaxis.  Bone scan revealed lytic lesions in the left frontal bone and vertebral column.  The patient had biopsy of the L2 spine and kyphoplasty.  The results were significant for mild plasmacytosis and patchy mild marrow fibrosis.     Patient is now being transferred to [facility] [place] medical management of co-morbidities while patient awaits brain biopsy by neurosurgery        Hospital Course:  3/27: MRI performed, continue heparin drip, continue keppra  3/28: continue heparin drip, pt reported some back stool last night, but morning h/h even rised, will continue to watch,   3/29: BM still dark, h/h stable, likely due to iron supplement causing it, continue heparin drip, got bolus for subtherapeutic aPTT  3/30: No acute events. Patient awaits brain biopsy per neurosurgery. pt had some colon distention on KUB; mild abdominal pain resolved w tylenol, no nausea or vomiting; NGT attempted and pt refused and could not tolerate  3/31: NGT attempted again w  urojet and pt refused and could not tolerate  4/1:   Pt denies abdominal pain, passed BM about 2 nights ago, passed flatus yesterday, NPO, IVF, heparin drip stopped 24hrs prior to surgery tmr;     Interval History: no acute events overnight, denies nausea, or abdominal pain    Review of Systems  Objective:     Vital Signs (Most Recent):  Temp: 97.6 °F (36.4 °C) (04/01/19 0720)  Pulse: 94 (04/01/19 0720)  Resp: 14 (04/01/19 0720)  BP: 135/85 (04/01/19 0720)  SpO2: 95 % (04/01/19 0720) Vital Signs (24h Range):  Temp:  [96.4 °F (35.8 °C)-98.2 °F (36.8 °C)] 97.6 °F (36.4 °C)  Pulse:  [55-94] 94  Resp:  [14-18] 14  SpO2:  [94 %-99 %] 95 %  BP: (124-136)/(61-85) 135/85     Weight: 89.7 kg (197 lb 12 oz)  Body mass index is 30.07 kg/m².    Intake/Output Summary (Last 24 hours) at 4/1/2019 0922  Last data filed at 3/31/2019 1800  Gross per 24 hour   Intake 980.64 ml   Output --   Net 980.64 ml      Physical Exam   Constitutional: He is oriented to person, place, and time. He appears well-developed and well-nourished.   HENT:   Head: Normocephalic and atraumatic.   Eyes: Pupils are equal, round, and reactive to light. EOM are normal.   Neck: Normal range of motion. Neck supple.   Cardiovascular: Normal rate and regular rhythm.   Pulmonary/Chest: Effort normal and breath sounds normal.   Abdominal: Soft. Bowel sounds are normal. There is no tenderness.   Musculoskeletal: Normal range of motion. He exhibits deformity.   Increased tone/spasticity of the LUE throughout (chronic, dt old stroke)    L LE s/p BKA stable   Neurological: He is alert and oriented to person, place, and time. He displays abnormal reflex. A cranial nerve deficit is present. He exhibits abnormal muscle tone. Coordination abnormal.   left hemiparesis; normal strength of the RUE/LE     loss of nasolabial fold on the left (likely chronic due to old stroke)       Significant Labs:   Recent Lab Results       04/01/19  0734   04/01/19  0602   04/01/19  0313    04/01/19  0005   03/31/19  1502        Immature Grans (Abs)     0.01  Comment:  Mild elevation in immature granulocytes is non specific and   can be seen in a variety of conditions including stress response,   acute inflammation, trauma and pregnancy. Correlation with other   laboratory and clinical findings is essential.           Albumin     2.2         Alkaline Phosphatase     73         ALT     14         Anion Gap     12         Aniso     Slight         aPTT     65.4  Comment:  aPTT therapeutic range = 39-69 seconds         AST     19         Baso #     0.02         Basophil%     0.6         Total Bilirubin     0.2  Comment:  For infants and newborns, interpretation of results should be based  on gestational age, weight and in agreement with clinical  observations.  Premature Infant recommended reference ranges:  Up to 24 hours.............<8.0 mg/dL  Up to 48 hours............<12.0 mg/dL  3-5 days..................<15.0 mg/dL  6-29 days.................<15.0 mg/dL           BUN, Bld     11         Calcium     8.7         Chloride     113         CO2     18         Creatinine     0.9         Differential Method     Automated         eGFR if      >60.0         eGFR if non      >60.0  Comment:  Calculation used to obtain the estimated glomerular filtration  rate (eGFR) is the CKD-EPI equation.            Eos #     0.1         Eosinophil%     2.2         Large/Giant Platelets     Present         Glucose     80         Gran # (ANC)     1.4         Gran%     39.1         Group & Rh               Hematocrit     36.4         Hemoglobin     11.4         Hypo     Occasional         Immature Granulocytes     0.3         INDIRECT IVAN               Lactate, Cosmo 0.8  Comment:  Falsely low lactic acid results can be found in samples   containing >=13.0 mg/dL total bilirubin and/or >=3.5 mg/dL   direct bilirubin.               Lymph #     1.6         Lymph%     45.6         Magnesium     1.6          MCH     26.0         MCHC     31.3         MCV     83         Mono #     0.4         Mono%     12.2         MPV     11.2         nRBC     0         Ovalocytes     Occasional         Phosphorus     2.7         Platelets     406         POCT Glucose   92   97 108     Poik     Slight         Poly     Occasional         Potassium     3.2         Total Protein     6.6         RBC     4.39         RDW     17.1         Sodium     143         WBC     3.60                          03/31/19  1038        Immature Grans (Abs)       Albumin       Alkaline Phosphatase       ALT       Anion Gap       Aniso       aPTT       AST       Baso #       Basophil%       Total Bilirubin       BUN, Bld       Calcium       Chloride       CO2       Creatinine       Differential Method       eGFR if        eGFR if non        Eos #       Eosinophil%       Large/Giant Platelets       Glucose       Gran # (ANC)       Gran%       Group & Rh A POS     Hematocrit       Hemoglobin       Hypo       Immature Granulocytes       INDIRECT IVAN NEG     Lactate, Cosmo       Lymph #       Lymph%       Magnesium       MCH       MCHC       MCV       Mono #       Mono%       MPV       nRBC       Ovalocytes       Phosphorus       Platelets       POCT Glucose       Poik       Poly       Potassium       Total Protein       RBC       RDW       Sodium       WBC           All pertinent labs within the past 24 hours have been reviewed.    Significant Imaging: I have reviewed all pertinent imaging results/findings within the past 24 hours.    Assessment/Plan:      * Brain mass  MRI of the brain with contrast revealed:  1. 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  Differential includes dural metastasis an atypical meningioma.  2. 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    Left frontal meningeal mass has been present on imaging since July 2018.     ?? Whether the left frontal  meningeal mass with bony erosion is something other than the vertebral lesion @ L2. The L2 biopsy results are as follows:   - Mild plasmacytosis.   - Patchy mild marrow fibrosis.   - Focal crush artifact.   - Results of outside consultative review and final diagnosis to follow  in an addendum report.      - NPO   - neurosurgery consulted, will have OR on Tuesday. stop heparin drip 24 hours before surgery.      Pre-op evaluation    ** Cardiovascular Risk Assessment:  Non-emergent surgery.  Cardiac problems (No unstable angina, decompensated heart failure). Atrial fibrillation and currently on AC  Surgery is Intermediate risk Head and neck surgery  Functional Status: Left BKA; wheelchair bound     Revised Cardiac Risk Index   1. History of ischemic heart disease   2. History of congestive heart failure   3. History of cerebrovascular disease (stroke or transient ischemic attack)   4. History of diabetes requiring preoperative insulin use   5. Chronic kidney disease (creatinine > 2 mg/dL)   6. Undergoing suprainguinal vascular, intraperitoneal, or intrathoracic surgery     Risk for cardiac death, nonfatal myocardial infarction, and nonfatal cardiac arrest      0 predictors = 0.4%, 1 predictor = 0.9%, 2 predictors = 6.6%, ?3 predictors = >11%     RCRI Calculator Class and Risk percentage:   1 predictors = 0.4 %                       Anticoagulation:  will hold anticoagulation 24 hours prior to surgery      Coronary Stenting: None  Preop cardiovascular exam     Surgical Risk Assessment   Active cardiac issues:     Active decompensated heart failure? No   Unstable angina?  No   Significant uncontrolled arrhythmias? No   Severe valvular heart disease-Aortic or Mitral Stenosis? No   Recent MI or coronary revascularization < 30 days? No      Cardiac Risk Factors  History of CAD/ischemic heart disease? No   History of cerebrovascular disease? Yes   History of compensated heart failure? No   Type 2 diabetes requiring insulin?  No   Serum Creatinine > 2? No   Total cardiac risk factors 1      Recommendation:  Patient is intermediate risk for intermediate risk surgery.           Dilatation of colon  3/31: Continues to complaint of diffuse abdominal pain, improved from yesterday, in setting of colonic dilation per abdominal imaging. No bowel movement; passing flatus.  Attempted NG tube placement in pm on 03/30/19 and patient pulled the NG tube. After that, he continues to refuse NG tube placement. States he would choose to die of complications with bowel perforation, but will not agree to NG tube placement.     Plan:  NPO status maintained.   IV fluids initiated; glucose checks Q6H to assess hypoglycemia.   If patient decompensated, will consider getting repeat lactate and surgical consultation        4/1: pt had no complaints of abdominal pain in the morning;   Lactate within normal level, afebrile, no tachy, no nausea or vomiting.  Will continue to watch;  Replete potassium as needed;  OOB;    Peripheral vascular disease of lower extremity        Alteration in skin integrity  Wound care consulted      Seizures  EEG revealing hemispheric slowing and frequent sharp waves consistent with seizure activity. No previous history of seizures reported. The new onset could be  acute infarct or secondary to frontal lobe mass     - continue Keppra  - seizure precautions           Nephrotic range proteinuria  Probably secondary to diabetic nephropathy and hypertensive nephrosclerosis. Uprot/creat = 5.37 indicative of nephrotic range proteinuria.   -optimize blood pressure medications if renal function allows           Metastatic carcinoma to bone  As noted on CT abd pelvis.  Source of these metastases is unclear.  Cannot rule out the possibility of CNS metastasis as well. Bone scan showing left frontal meningeal mass and lytic lesion on L2 consistent metastasis. There was also no lesions/mass seen in the chest which could indicate possible primary  source in the  tract or thyroid. Thyroid ultrasound with no evidence of malignancy   - s/p L2 lesion pathology findings showing a 5-10% representation of plasma cells. This could be secondary to an abnormal reaction to something or the beginnings of multiple myeloma. The small percentage doesn't qualify for MM but it could be a result of such diseases including lymphoma or  waldenstom's macroglobulinemia. Patient can also have isolated plasmacytoma in the brain. If such is diagnosed then the treatment may require localized radiation.     - Patient will need biopsy of brain mass to confirm.   - urine kappa/lambda 1.61            Type 2 diabetes mellitus with diabetic peripheral angiopathy without gangrene, without long-term current use of insulin  - holding oral medications    - NPO now    Normocytic anemia  Anemia workup was ordered showing iron deficiency anemia most likely secondary to malignancy and poor intake.     - continue iron supplementation     - add vit c 500mg TID         Cerebrovascular accident (CVA) due to embolism of right middle cerebral artery  Patient with known previous CVA with deficits. CT head showing  Acute/subacute right temporoparietal infarct with mild local mass effect and small amount of faith-infarct hemorrhage posteriorly.    - seizure precautions  w keprra  - patient not on AC currently; reviewed neurology notes from OSH- who recommended restarting AC for afib; now hold heparin drip while waiting on OR Tuesday          Atrial fibrillation  Cannot rule out the possibility of embolic event      - repeat EKG  - continue metoprolol 12.5mg for rate control   - CHADSvasc 7,hold heparin drip today, which is 24 hours prior to OR on Tuesday             VTE Risk Mitigation (From admission, onward)        Ordered     IP VTE HIGH RISK PATIENT  Once      03/27/19 0053              Justino Jameson MD  Department of Hospital Medicine   [facility]

## 2019-04-01 NOTE — ASSESSMENT & PLAN NOTE
MRI of the brain with contrast revealed:  1. 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  Differential includes dural metastasis an atypical meningioma.  2. 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    Left frontal meningeal mass has been present on imaging since July 2018.     ?? Whether the left frontal meningeal mass with bony erosion is something other than the vertebral lesion @ L2. The L2 biopsy results are as follows:   - Mild plasmacytosis.   - Patchy mild marrow fibrosis.   - Focal crush artifact.   - Results of outside consultative review and final diagnosis to follow  in an addendum report.      - NPO   - neurosurgery consulted, will have OR on Tuesday. stop heparin drip 24 hours before surgery.

## 2019-04-01 NOTE — PLAN OF CARE
Problem: Adult Inpatient Plan of Care  Goal: Plan of Care Review  Outcome: Ongoing (interventions implemented as appropriate)  Greeted patient and gained consent for nursing care. POC reviewed, verbalized understanding. Prepped and made comfortable for the night. No complaint of pain. Assisted in her needs. Will continue to monitor.

## 2019-04-02 ENCOUNTER — ANESTHESIA (OUTPATIENT)
Dept: MEDSURG UNIT | Facility: HOSPITAL | Age: 79
End: 2019-04-02

## 2019-04-02 LAB
ALBUMIN SERPL BCP-MCNC: 2.3 G/DL (ref 3.5–5.2)
ALP SERPL-CCNC: 77 U/L (ref 55–135)
ALT SERPL W/O P-5'-P-CCNC: 19 U/L (ref 10–44)
ANION GAP SERPL CALC-SCNC: 7 MMOL/L (ref 8–16)
ANISOCYTOSIS BLD QL SMEAR: SLIGHT
APTT BLDCRRT: 39 SEC (ref 21–32)
APTT BLDCRRT: 39.2 SEC (ref 21–32)
AST SERPL-CCNC: 25 U/L (ref 10–40)
BASOPHILS # BLD AUTO: 0.03 K/UL (ref 0–0.2)
BASOPHILS NFR BLD: 0.7 % (ref 0–1.9)
BILIRUB SERPL-MCNC: 0.3 MG/DL (ref 0.1–1)
BUN SERPL-MCNC: 7 MG/DL (ref 8–23)
CALCIUM SERPL-MCNC: 9 MG/DL (ref 8.7–10.5)
CHLORIDE SERPL-SCNC: 111 MMOL/L (ref 95–110)
CO2 SERPL-SCNC: 22 MMOL/L (ref 23–29)
CREAT SERPL-MCNC: 0.9 MG/DL (ref 0.5–1.4)
DIFFERENTIAL METHOD: ABNORMAL
EOSINOPHIL # BLD AUTO: 0.1 K/UL (ref 0–0.5)
EOSINOPHIL NFR BLD: 2.7 % (ref 0–8)
ERYTHROCYTE [DISTWIDTH] IN BLOOD BY AUTOMATED COUNT: 17.2 % (ref 11.5–14.5)
EST. GFR  (AFRICAN AMERICAN): >60 ML/MIN/1.73 M^2
EST. GFR  (NON AFRICAN AMERICAN): >60 ML/MIN/1.73 M^2
GLUCOSE SERPL-MCNC: 89 MG/DL (ref 70–110)
HCT VFR BLD AUTO: 37.5 % (ref 40–54)
HGB BLD-MCNC: 11.6 G/DL (ref 14–18)
IMM GRANULOCYTES # BLD AUTO: 0.02 K/UL (ref 0–0.04)
IMM GRANULOCYTES NFR BLD AUTO: 0.5 % (ref 0–0.5)
INR PPP: 1 (ref 0.8–1.2)
LYMPHOCYTES # BLD AUTO: 1.8 K/UL (ref 1–4.8)
LYMPHOCYTES NFR BLD: 40.5 % (ref 18–48)
MAGNESIUM SERPL-MCNC: 1.8 MG/DL (ref 1.6–2.6)
MCH RBC QN AUTO: 25.9 PG (ref 27–31)
MCHC RBC AUTO-ENTMCNC: 30.9 G/DL (ref 32–36)
MCV RBC AUTO: 84 FL (ref 82–98)
MONOCYTES # BLD AUTO: 0.4 K/UL (ref 0.3–1)
MONOCYTES NFR BLD: 10 % (ref 4–15)
NEUTROPHILS # BLD AUTO: 2 K/UL (ref 1.8–7.7)
NEUTROPHILS NFR BLD: 45.6 % (ref 38–73)
NRBC BLD-RTO: 0 /100 WBC
OVALOCYTES BLD QL SMEAR: ABNORMAL
PHOSPHATE SERPL-MCNC: 2.1 MG/DL (ref 2.7–4.5)
PLATELET # BLD AUTO: 373 K/UL (ref 150–350)
PMV BLD AUTO: 10.9 FL (ref 9.2–12.9)
POCT GLUCOSE: 132 MG/DL (ref 70–110)
POCT GLUCOSE: 98 MG/DL (ref 70–110)
POIKILOCYTOSIS BLD QL SMEAR: SLIGHT
POTASSIUM SERPL-SCNC: 3.8 MMOL/L (ref 3.5–5.1)
PROT SERPL-MCNC: 7 G/DL (ref 6–8.4)
PROTHROMBIN TIME: 10.6 SEC (ref 9–12.5)
RBC # BLD AUTO: 4.48 M/UL (ref 4.6–6.2)
SODIUM SERPL-SCNC: 140 MMOL/L (ref 136–145)
WBC # BLD AUTO: 4.4 K/UL (ref 3.9–12.7)

## 2019-04-02 PROCEDURE — 25000003 PHARM REV CODE 250: Performed by: STUDENT IN AN ORGANIZED HEALTH CARE EDUCATION/TRAINING PROGRAM

## 2019-04-02 PROCEDURE — 63600175 PHARM REV CODE 636 W HCPCS: Performed by: STUDENT IN AN ORGANIZED HEALTH CARE EDUCATION/TRAINING PROGRAM

## 2019-04-02 PROCEDURE — 85730 THROMBOPLASTIN TIME PARTIAL: CPT

## 2019-04-02 PROCEDURE — 85610 PROTHROMBIN TIME: CPT

## 2019-04-02 PROCEDURE — 85730 THROMBOPLASTIN TIME PARTIAL: CPT | Mod: 91

## 2019-04-02 PROCEDURE — 36415 COLL VENOUS BLD VENIPUNCTURE: CPT

## 2019-04-02 PROCEDURE — 83735 ASSAY OF MAGNESIUM: CPT

## 2019-04-02 PROCEDURE — 84100 ASSAY OF PHOSPHORUS: CPT

## 2019-04-02 PROCEDURE — 99233 SBSQ HOSP IP/OBS HIGH 50: CPT | Mod: GC,,, | Performed by: HOSPITALIST

## 2019-04-02 PROCEDURE — 99222 PR INITIAL HOSPITAL CARE,LEVL II: ICD-10-PCS | Mod: ,,, | Performed by: PSYCHIATRY & NEUROLOGY

## 2019-04-02 PROCEDURE — 25000003 PHARM REV CODE 250: Performed by: INTERNAL MEDICINE

## 2019-04-02 PROCEDURE — 11000001 HC ACUTE MED/SURG PRIVATE ROOM

## 2019-04-02 PROCEDURE — 99222 1ST HOSP IP/OBS MODERATE 55: CPT | Mod: ,,, | Performed by: PSYCHIATRY & NEUROLOGY

## 2019-04-02 PROCEDURE — 80053 COMPREHEN METABOLIC PANEL: CPT

## 2019-04-02 PROCEDURE — 99233 PR SUBSEQUENT HOSPITAL CARE,LEVL III: ICD-10-PCS | Mod: GC,,, | Performed by: HOSPITALIST

## 2019-04-02 PROCEDURE — 85025 COMPLETE CBC W/AUTO DIFF WBC: CPT

## 2019-04-02 PROCEDURE — 99233 PR SUBSEQUENT HOSPITAL CARE,LEVL III: ICD-10-PCS | Mod: ,,, | Performed by: CLINICAL NURSE SPECIALIST

## 2019-04-02 PROCEDURE — 99233 SBSQ HOSP IP/OBS HIGH 50: CPT | Mod: ,,, | Performed by: CLINICAL NURSE SPECIALIST

## 2019-04-02 RX ORDER — DOCUSATE SODIUM 283 MG/5ML
1 LIQUID RECTAL DAILY
Status: DISCONTINUED | OUTPATIENT
Start: 2019-04-03 | End: 2019-04-04 | Stop reason: HOSPADM

## 2019-04-02 RX ADMIN — SIMETHICONE CHEW TAB 80 MG 80 MG: 80 TABLET ORAL at 04:04

## 2019-04-02 RX ADMIN — OXYCODONE HYDROCHLORIDE AND ACETAMINOPHEN 500 MG: 500 TABLET ORAL at 09:04

## 2019-04-02 RX ADMIN — SODIUM CHLORIDE, SODIUM LACTATE, POTASSIUM CHLORIDE, AND CALCIUM CHLORIDE: .6; .31; .03; .02 INJECTION, SOLUTION INTRAVENOUS at 06:04

## 2019-04-02 RX ADMIN — AMLODIPINE BESYLATE 10 MG: 10 TABLET ORAL at 09:04

## 2019-04-02 RX ADMIN — LEVETIRACETAM 750 MG: 750 TABLET, FILM COATED ORAL at 08:04

## 2019-04-02 RX ADMIN — SIMETHICONE CHEW TAB 80 MG 80 MG: 80 TABLET ORAL at 08:04

## 2019-04-02 RX ADMIN — OXYCODONE HYDROCHLORIDE AND ACETAMINOPHEN 500 MG: 500 TABLET ORAL at 04:04

## 2019-04-02 RX ADMIN — HEPARIN SODIUM AND DEXTROSE 12 UNITS/KG/HR: 10000; 5 INJECTION INTRAVENOUS at 09:04

## 2019-04-02 RX ADMIN — OXYCODONE HYDROCHLORIDE AND ACETAMINOPHEN 500 MG: 500 TABLET ORAL at 08:04

## 2019-04-02 RX ADMIN — ATORVASTATIN CALCIUM 40 MG: 20 TABLET, FILM COATED ORAL at 09:04

## 2019-04-02 RX ADMIN — SIMETHICONE CHEW TAB 80 MG 80 MG: 80 TABLET ORAL at 09:04

## 2019-04-02 RX ADMIN — LEVETIRACETAM 750 MG: 750 TABLET, FILM COATED ORAL at 09:04

## 2019-04-02 RX ADMIN — MAGNESIUM SULFATE HEPTAHYDRATE 1 G: 500 INJECTION, SOLUTION INTRAMUSCULAR; INTRAVENOUS at 09:04

## 2019-04-02 RX ADMIN — METOPROLOL SUCCINATE 12.5 MG: 25 TABLET, EXTENDED RELEASE ORAL at 09:04

## 2019-04-02 RX ADMIN — HEPARIN SODIUM AND DEXTROSE 12 UNITS/KG/HR: 10000; 5 INJECTION INTRAVENOUS at 07:04

## 2019-04-02 NOTE — PROGRESS NOTES
[facility] Hospital Medicine  Progress Note    Patient Name: Bari Hogan  MRN: [id number]  Patient Class: IP- Inpatient   Admission Date: 3/26/2019  Length of Stay: 7 days  Attending Physician: Franchesca Pablo MD  Primary Care Provider: [facility] Medicine Team: [facility] [place] [facility]P MED 3 Justino Jameson MD    Subjective:     Principal Problem:Brain mass    HPI:  Mr Hogan is a 79 y.o M w/ a history significant for hypertension, CVA, and type 2 diabetes mellitus presented to OS w/ altered mental status on 03/16/2019.  An MRI of the brain was done which was significant for a left frontal meningeal mass with mild local mass effect and adjacent osseous erosion. The differential includes dural metastasis and atypical meningioma.  There was also a subacute to chronic right temporoparietal infarct.  An EEG was done which was consistent with seizure activity and the patient was loaded with Keppra and this has continued for prophylaxis.  Bone scan revealed lytic lesions in the left frontal bone and vertebral column.  The patient had biopsy of the L2 spine and kyphoplasty.  The results were significant for mild plasmacytosis and patchy mild marrow fibrosis.     Patient is now being transferred to [facility] [place] [facility] for medical management of co-morbidities while patient awaits brain biopsy by neurosurgery        Hospital Course:  3/27: MRI performed, continue heparin drip, continue keppra  3/28: continue heparin drip, pt reported some back stool last night, but morning h/h even rised, will continue to watch,   3/29: BM still dark, h/h stable, likely due to iron supplement causing it, continue heparin drip, got bolus for subtherapeutic aPTT  3/30: No acute events. Patient awaits brain biopsy per neurosurgery. pt had some colon distention on KUB; mild abdominal pain resolved w tylenol, no nausea or vomiting; NGT attempted and pt refused and could not tolerate  3/31: NGT attempted again w urojet  and pt refused and could not tolerate  4/1:   Pt denies abdominal pain, passed BM about 2 nights ago, passed flatus yesterday, NPO, IVF, heparin drip stopped 24hrs prior to surgery tmr; later in the afternoon, pt refused surgery, heparin drip resumed at night  4/2:  Pt initially still declining surgery in the morning, after meeting with family, he changed his mind and agreeable to surgery, NSG notified.    Interval History: no acute issues, heparin drip resumed last night    Review of Systems  Objective:     Vital Signs (Most Recent):  Temp: 97.4 °F (36.3 °C) (04/02/19 1129)  Pulse: 86 (04/02/19 1129)  Resp: 18 (04/02/19 1129)  BP: (!) 149/68 (04/02/19 1129)  SpO2: 96 % (04/02/19 1129) Vital Signs (24h Range):  Temp:  [97.2 °F (36.2 °C)-98.4 °F (36.9 °C)] 97.4 °F (36.3 °C)  Pulse:  [78-89] 86  Resp:  [14-18] 18  SpO2:  [95 %-97 %] 96 %  BP: (122-149)/(65-83) 149/68     Weight: 89.7 kg (197 lb 12 oz)  Body mass index is 30.07 kg/m².    Intake/Output Summary (Last 24 hours) at 4/2/2019 1453  Last data filed at 4/2/2019 1000  Gross per 24 hour   Intake 665.83 ml   Output 920 ml   Net -254.17 ml      Physical Exam   Constitutional: He is oriented to person, place, and time. He appears well-developed and well-nourished.   HENT:   Head: Normocephalic and atraumatic.   Eyes: Pupils are equal, round, and reactive to light. EOM are normal.   Neck: Normal range of motion. Neck supple.   Cardiovascular: Normal rate and regular rhythm.   Pulmonary/Chest: Effort normal and breath sounds normal.   Abdominal: Soft. Bowel sounds are normal. He exhibits distension. There is no tenderness.   Musculoskeletal: Normal range of motion. He exhibits deformity.   Increased tone/spasticity of the LUE throughout (chronic, dt old stroke)    L LE s/p BKA stable   Neurological: He is alert and oriented to person, place, and time. He displays abnormal reflex. A cranial nerve deficit is present. He exhibits abnormal muscle tone. Coordination  abnormal.   left hemiparesis; normal strength of the RUE/LE     loss of nasolabial fold on the left (likely chronic due to old stroke)       Significant Labs:   Recent Lab Results       04/02/19  1148   04/02/19  0612   04/02/19  0534   04/02/19  0027   04/01/19 2020        Immature Grans (Abs)     0.02  Comment:  Mild elevation in immature granulocytes is non specific and   can be seen in a variety of conditions including stress response,   acute inflammation, trauma and pregnancy. Correlation with other   laboratory and clinical findings is essential.           Albumin     2.3         Alkaline Phosphatase     77         ALT     19         Anion Gap     7         Aniso     Slight         aPTT 39.0  Comment:  aPTT therapeutic range = 39-69 seconds   39.2  Comment:  aPTT therapeutic range = 39-69 seconds   25.5  Comment:  aPTT therapeutic range = 39-69 seconds     AST     25         Baso #     0.03         Basophil%     0.7         Total Bilirubin     0.3  Comment:  For infants and newborns, interpretation of results should be based  on gestational age, weight and in agreement with clinical  observations.  Premature Infant recommended reference ranges:  Up to 24 hours.............<8.0 mg/dL  Up to 48 hours............<12.0 mg/dL  3-5 days..................<15.0 mg/dL  6-29 days.................<15.0 mg/dL           BUN, Bld     7         Calcium     9.0         Chloride     111         CO2     22         Creatinine     0.9         Differential Method     Automated         eGFR if      >60.0         eGFR if non      >60.0  Comment:  Calculation used to obtain the estimated glomerular filtration  rate (eGFR) is the CKD-EPI equation.            Eos #     0.1         Eosinophil%     2.7         Glucose     89         Gran # (ANC)     2.0         Gran%     45.6         Hematocrit     37.5         Hemoglobin     11.6         Immature Granulocytes     0.5         Coumadin Monitoring INR      1.0  Comment:  Coumadin Therapy:  2.0 - 3.0 for INR for all indicators except mechanical heart valves  and antiphospholipid syndromes which should use 2.5 - 3.5.     1.0  Comment:  Coumadin Therapy:  2.0 - 3.0 for INR for all indicators except mechanical heart valves  and antiphospholipid syndromes which should use 2.5 - 3.5.       Lymph #     1.8         Lymph%     40.5         Magnesium     1.8         MCH     25.9         MCHC     30.9         MCV     84         Mono #     0.4         Mono%     10.0         MPV     10.9         nRBC     0         Ovalocytes     Occasional         Phosphorus     2.1         Platelets     373         POCT Glucose   98   132       Poik     Slight         Potassium     3.8         Total Protein     7.0         Protime     10.6   10.4     RBC     4.48         RDW     17.2         Sodium     140         WBC     4.40                          04/01/19  1718        Immature Grans (Abs)       Albumin       Alkaline Phosphatase       ALT       Anion Gap       Aniso       aPTT       AST       Baso #       Basophil%       Total Bilirubin       BUN, Bld       Calcium       Chloride       CO2       Creatinine       Differential Method       eGFR if        eGFR if non        Eos #       Eosinophil%       Glucose       Gran # (ANC)       Gran%       Hematocrit       Hemoglobin       Immature Granulocytes       Coumadin Monitoring INR       Lymph #       Lymph%       Magnesium       MCH       MCHC       MCV       Mono #       Mono%       MPV       nRBC       Ovalocytes       Phosphorus       Platelets       POCT Glucose 87     Poik       Potassium       Total Protein       Protime       RBC       RDW       Sodium       WBC           All pertinent labs within the past 24 hours have been reviewed.    Significant Imaging: I have reviewed all pertinent imaging results/findings within the past 24 hours.    Assessment/Plan:      * Brain mass  MRI of the brain with  contrast revealed:  1. 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  Differential includes dural metastasis an atypical meningioma.  2. 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    Left frontal meningeal mass has been present on imaging since July 2018.     ?? Whether the left frontal meningeal mass with bony erosion is something other than the vertebral lesion @ L2. The L2 biopsy results are as follows:   - Mild plasmacytosis.   - Patchy mild marrow fibrosis.   - Focal crush artifact.   - Results of outside consultative review and final diagnosis to follow  in an addendum report.    Pt refused surgery on 4/1, determined to have capacity to make medical decisions  After meeting w family and discussion w family, pt then agreed to surgery on 4/2. NSG notified, pending OR capacity.      Palliative care encounter        Pre-op evaluation    ** Cardiovascular Risk Assessment:  Non-emergent surgery.  Cardiac problems (No unstable angina, decompensated heart failure). Atrial fibrillation and currently on AC  Surgery is Intermediate risk Head and neck surgery  Functional Status: Left BKA; wheelchair bound     Revised Cardiac Risk Index   1. History of ischemic heart disease   2. History of congestive heart failure   3. History of cerebrovascular disease (stroke or transient ischemic attack)   4. History of diabetes requiring preoperative insulin use   5. Chronic kidney disease (creatinine > 2 mg/dL)   6. Undergoing suprainguinal vascular, intraperitoneal, or intrathoracic surgery     Risk for cardiac death, nonfatal myocardial infarction, and nonfatal cardiac arrest      0 predictors = 0.4%, 1 predictor = 0.9%, 2 predictors = 6.6%, ?3 predictors = >11%     RCRI Calculator Class and Risk percentage:   1 predictors = 0.4 %                       Anticoagulation:  will hold anticoagulation 24 hours prior to surgery      Coronary Stenting: None  Preop cardiovascular exam      Surgical Risk Assessment   Active cardiac issues:     Active decompensated heart failure? No   Unstable angina?  No   Significant uncontrolled arrhythmias? No   Severe valvular heart disease-Aortic or Mitral Stenosis? No   Recent MI or coronary revascularization < 30 days? No      Cardiac Risk Factors  History of CAD/ischemic heart disease? No   History of cerebrovascular disease? Yes   History of compensated heart failure? No   Type 2 diabetes requiring insulin? No   Serum Creatinine > 2? No   Total cardiac risk factors 1      Recommendation:  Patient is intermediate risk for intermediate risk surgery.           Dilatation of colon  3/31: Continues to complaint of diffuse abdominal pain, improved from yesterday, in setting of colonic dilation per abdominal imaging. No bowel movement; passing flatus.  Attempted NG tube placement in pm on 03/30/19 and patient pulled the NG tube. After that, he continues to refuse NG tube placement. States he would choose to die of complications with bowel perforation, but will not agree to NG tube placement.     Plan:  NPO status maintained.   IV fluids initiated; glucose checks Q6H to assess hypoglycemia.   If patient decompensated, will consider getting repeat lactate and surgical consultation        4/1: pt had no complaints of abdominal pain in the morning;   Lactate within normal level, afebrile, no tachy, no nausea or vomiting.  Will continue to watch;  Replete potassium as needed;  OOB;    Peripheral vascular disease of lower extremity        Alteration in skin integrity  Wound care consulted      Seizures  EEG revealing hemispheric slowing and frequent sharp waves consistent with seizure activity. No previous history of seizures reported. The new onset could be  acute infarct or secondary to frontal lobe mass     - continue Keppra  - seizure precautions           Nephrotic range proteinuria  Probably secondary to diabetic nephropathy and hypertensive nephrosclerosis.  Uprot/creat = 5.37 indicative of nephrotic range proteinuria.   -optimize blood pressure medications if renal function allows           Metastatic carcinoma to bone  As noted on CT abd pelvis.  Source of these metastases is unclear.  Cannot rule out the possibility of CNS metastasis as well. Bone scan showing left frontal meningeal mass and lytic lesion on L2 consistent metastasis. There was also no lesions/mass seen in the chest which could indicate possible primary source in the  tract or thyroid. Thyroid ultrasound with no evidence of malignancy   - s/p L2 lesion pathology findings showing a 5-10% representation of plasma cells. This could be secondary to an abnormal reaction to something or the beginnings of multiple myeloma. The small percentage doesn't qualify for MM but it could be a result of such diseases including lymphoma or  waldenstom's macroglobulinemia. Patient can also have isolated plasmacytoma in the brain. If such is diagnosed then the treatment may require localized radiation.     - Patient will need biopsy of brain mass to confirm.   - urine kappa/lambda 1.61            Type 2 diabetes mellitus with diabetic peripheral angiopathy without gangrene, without long-term current use of insulin  - holding oral medications    - dysphagia diet      Normocytic anemia  Anemia workup was ordered showing iron deficiency anemia most likely secondary to malignancy and poor intake.     - continue iron supplementation     - add vit c 500mg TID         Cerebrovascular accident (CVA) due to embolism of right middle cerebral artery  Patient with known previous CVA with deficits. CT head showing  Acute/subacute right temporoparietal infarct with mild local mass effect and small amount of faith-infarct hemorrhage posteriorly.    - seizure precautions  w keprra  - patient not on AC currently; reviewed neurology notes from OSH- who recommended restarting AC for afib; now hold heparin drip while waiting on OR  Tuesday          Atrial fibrillation  Cannot rule out the possibility of embolic event      - repeat EKG  - continue metoprolol 12.5mg for rate control   - CHADSvasc 7,pt refused surgery 4/1, heparin resumed,               VTE Risk Mitigation (From admission, onward)        Ordered     heparin 25,000 units in dextrose 5% (100 units/ml) IV bolus from bag - ADDITIONAL PRN BOLUS - 60 units/kg  As needed (PRN)      04/02/19 1152     heparin 25,000 units in dextrose 5% (100 units/ml) IV bolus from bag - ADDITIONAL PRN BOLUS - 30 units/kg  As needed (PRN)      04/02/19 1152     heparin 25,000 units in dextrose 5% 250 mL (100 units/mL) infusion LOW INTENSITY nomogram - [facility]  Continuous      04/01/19 2006     IP VTE HIGH RISK PATIENT  Once      03/27/19 0053              Justino Jameson MD  Department of Hospital Medicine   [facility]

## 2019-04-02 NOTE — ASSESSMENT & PLAN NOTE
Palliative care consulted for goals of care.  Palliative care APRN met with patient at bedside.  Palliative care introduced.    No family is present at this time.     Impression:  Mr. 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 He was transferred from outside hospital for medical management of comorbidites prior to having brain biopsy. Biopsy is scheduled for 4/2/19.  He is sitting up in bed, converses freely, alert oriented x4, able to follow commands. CAM assessment negative   He states no pain, shortness of breath or other discomforts.     Advanced Care Planning:   Advance Care Planning   - No advanced directives on file prior to this encounter.   - Advanced care planning education provided.    - States understanding he has a mass in his brain and believes this is related to what is in his spine.    - Discussed resuscitation status:  CPR,  Intubation.    Mr. Hogan states he would not want to have CPR or to have a breathing tube.  Living will not completed at this time as he states he would chose to have a trial of tube feeding if the doctors think it will help him. This option not available on basic living will documents.   - [place] in addition to advanced directives  may be beneficial as it addresses trial of artifical feeding and  resuscitation status    - Health care power of  obtained.  Listed his son Malvin Ibrahim [phone] and the first decision maker and Lulu Hogan (ex-wife second only if Malvin is not available) [phone].  Explained that his daughters live out of town and may not be available when needed   - Appears to have good understanding and insight regarding  current clinical condition. Capacity has been confirmed by neurosurgery and psychialtry.   -  He states understanding he has a mass in his brain he is concerned about the care he will need after the  "surgery. Mr. Hogan is  questioning how the surgery will impact his overall outcome.         Goals of Care:   - Met with patient, son Malvin and grandson Aldair at bedside.  Patient and family are able to recall conversations with the neurosurgery team and are able to state what could possibly happen if he does not agree to have the procedure.   Patient states he has decided to have the biopsy.   - Palliative care explained the plan is to remove the mass and this is done through a craniotomy.    -  He states understanding he has a mass in his brain he is concerned about the care he will need after the surgery. Previously Mr. Hogan has stated understanding this could be cancer.  Mr. Hogan with questions about the discharge plan " If I have this surgery and the mass is cancer, what does this mean for me.  What comes next?   Mr. Hogan is  questioning how the surgery will impact his overall outcome.       -  discussed the risks and benefits of not having the biopsy - not able to develop reasonable treatment, plan, debility, possible death.   - Mr. Hogan states he understands this and accepts the risks associated with not having the biopsy. Further states he is not confused.  He understands not treating this could lead to his death.  States everyone will die one day and we do not have control over this.  I have lived a good life. I don't want anyone cutting on me again.  I want to spend what is left with my family.   - Son with questions about home health and need for 24 hr care at home as his dad already has debility. As discussed with neurosurgery PA after surgery he may need PT/OT evaluation as well as speech evaluation.  Explained to patient and son  it is too early to know what services he may require and that all of these questions will be addressed. d.    - Palliative care recommends a consult for [facility] whether or not Mr. Hogan has surgery         Plan/Recommendations  - Postpone brain biopsy until " clear goals of care are established.  - Palliative care will continue to follow and assist family in developing realistic goals of care - will address comfort goals of care   - Mr. Hogan is a [employer] and established with [facility]. Care will look into [facility] palliative [facility] benefits - will consult with pal. Care LCSW  - Patient has indicated being amenable to DNR - to be addressed by primary team.   - emotional support

## 2019-04-02 NOTE — NURSING
Paged and received call back from IM 3 reported to her that pt and son stated that he will like to presue having surgery and if surgery isn't able to be done toady if he can have a diet order place. She stated she will review the chart.

## 2019-04-02 NOTE — HPI
"Per Primary MD:  Per H&P: Mr Hogan is a 79 y.o M w/ a history significant for hypertension, CVA, and type 2 diabetes mellitus presented to OSH w/ altered mental status on 03/16/2019.  An MRI of the brain was done which was significant for a left frontal meningeal mass with mild local mass effect and adjacent osseous erosion. The differential includes dural metastasis and atypical meningioma.  There was also a subacute to chronic right temporoparietal infarct.  An EEG was done which was consistent with seizure activity and the patient was loaded with Keppra and this has continued for prophylaxis.  Bone scan revealed lytic lesions in the left frontal bone and vertebral column.  The patient had biopsy of the L2 spine and kyphoplasty.  The results were significant for mild plasmacytosis and patchy mild marrow fibrosis.      Patient is now being transferred to [facility] for a cranial biopsy for appropriate diagnosis and subsequent treatment plan.    Per Psychiatry:   Bari Hogan is a 79 y.o. male with no reported past psychiatric history, currently presenting with Brain mass.  Psychiatry was consulted to assist with capacity determination.     Pt calm and cooperative with interview.  Pt reported that he was feeling his normal self until about 1 wk ago "when I had a stroke on my right side."  Pt denied experiencing any symptoms at the time.  Pt confirmed documentation regarding a brain mass.  Pt reported that he was first told about a brain mass 4-5 days ago.  Pt denies any adverse events from the brain mass until now.  He endorses past neurological history of CVA x2 (with left sided symptoms).  He understands that he was transferred for workup of the brain mass.  He understands that the neurosurgical team is planning a biopsy "so they can see what it is."  He reports that the biopsy will assist with guiding treatment.  He understands that refusal of the biopsy could lead to treatment failure and, eventually, death.  He says " "that risks of undergoing the biopsy include "my going back and forth to the hospital and death."  He consistently voices decision to refuse biopsy, saying "If I'm gonna die, that's God's plan.  I've lived a good life." He denies any psychiatric issues currently.  He reports "great" mood, denies anxiety, SI/HI, hallucinations or post-traumatic symptoms.  He denies ever seeing a psychiatrist or taking psychiatric medications.  He voices appreciation for psychiatric involvement in his care.   Patient reassessed in AM. Oriented x 4, CAM-ICU negative. Reports mood as "alright". Again patient understands the indication/risk/benefit of the brain biopsy and has the capacity to do so.      Collateral:   Spoke with family members, Quincy (sp?) and Aldair at bedside. Corroborate what patient reports above. Also confirm that patient has no previous psychiatric history. Affirm that patient initially came to the hospital confused, but he is now at his baseline. Believe that patient has capacity - "he's got to make his own decision". Encouraged them to set up a family meeting with primary team.     SUBJECTIVE   Currently, the patient is endorsing the following:    Medical Review Of Systems:  All systems reviewed and are negative except as noted above in HPI.    Psychiatric Review Of Systems - Is patient experiencing or having changes in:  sleep: no  appetite: no  weight: no  energy/anergy: no  interest/pleasure/anhedonia: no  somatic symptoms: no  guilty/hopelessness: no  concentration: no  S.I.B.s/risky behavior: no  SI/SA:  no    anxiety/panic: no  Agoraphobia:  no  Social phobia:  no  Recurrent nightmares:  no  hyper startle response:  no  Avoidance: no  Recurrent thoughts:  no  Recurrent behaviors:  no    Irritability: no  Racing thoughts: no  Impulsive behaviors: no  Pressured speech:  no    Paranoia:no  Delusions: no  AVH:no    Past Medical/Surgical History:  Past Medical History:   Diagnosis Date    Arthritis     Chronic " constipation     Diabetes mellitus     Hypertension     Stroke     x2      has a past medical history of Arthritis, Chronic constipation, Diabetes mellitus, Hypertension, and Stroke.  Past Surgical History:   Procedure Laterality Date    Kyphoplasty-L2 and biopsy N/A 3/20/2019    Performed by Sarthak Son MD at [facility] OR       Past Psychiatric History:  Previous Medication Trials: No  Previous Psychiatric Hospitalizations: No  Previous Suicide Attempts: No  History of Violence: No  Outpatient Psychiatrist: No  Outpatient Psychotherapist: No    Social History:  Marital Status:   Children: 3   Employment Status/Info: retired  Education: high school graduate, trade school  Special Ed: no  Housing/Lives with: lives alone in apartment, wife lives in nearby apartment and checks in on pt daily  History of phys/sexual abuse: No  Access to gun: No    Substance Abuse History:  Recreational Drugs: denies  Use of Alcohol: denies  Rehab History:no   Tobacco Use:quit 20 yrs ago    Legal History:  Past Charges/Incarcerations:no  Pending charges:no     Family Psychiatric History:   Denies

## 2019-04-02 NOTE — ASSESSMENT & PLAN NOTE
CAPACITY EVALUATION     Capacity for:  · Refusal of brain biopsy    Capacity for a given decision requires that the patient:  · Understands the proposed treatment and/or the proposed options for his care.  · Appreciates his own medical situation and can abstract how the treatments/proposed options for care apply to his medical situation.  · Understands the consequences of his medical decisions in a reasonable and factual manner supported by the facts and his own values in a consistent fashion.  · Demonstrates the ability to communicate a choice clearly and consistently.    Assessment of capacity:  Pt is a 79yoM w/ no reported past psychiatric history who was transferred from outside hospital for workup of brain mass.  Psychiatry consulted for assistance with capacity determination.  Primary team, neurosurgery, and palliative care feel that pt has capacity.  On my interview, pt able to communicate risks of undergoing and refusing biopsy, able to provide a consistent decision, and able to explain his reasoning.  Would agree that pt has capacity to make decision regarding refusal of biopsy.      Recommendations  Primary team to set up family meeting to coordinate care. Patient states that he is willing to think about the biopsy but is willing to have a discussion involving primary team and family.    Psychiatry will sign off at this time, thank you for the consult.  Psychiatry available for re-consultation if additional assistance with management is needed in the future.

## 2019-04-02 NOTE — SUBJECTIVE & OBJECTIVE
Interval History:     Past Medical History:   Diagnosis Date    Arthritis     Chronic constipation     Diabetes mellitus     Hypertension     Stroke     x2       Past Surgical History:   Procedure Laterality Date    Kyphoplasty-L2 and biopsy N/A 3/20/2019    Performed by Sarthak Son MD at [facility] OR       Review of patient's allergies indicates:   Allergen Reactions    Lisinopril      dizziness       Medications:  Continuous Infusions:   heparin (porcine) in D5W 12 Units/kg/hr (04/02/19 0705)    lactated ringers 125 mL/hr at 04/02/19 0627     Scheduled Meds:   amLODIPine  10 mg Oral Daily    ascorbic acid (vitamin C)  500 mg Oral TID    atorvastatin  40 mg Oral Daily    levETIRAcetam  750 mg Oral BID    metoprolol succinate  12.5 mg Oral Daily    simethicone  1 tablet Oral TID     PRN Meds:acetaminophen, dextrose 50%, glucose, glucose, heparin (PORCINE), heparin (PORCINE), lidocaine HCl 2%, sodium chloride 0.9%    Family History     None        Tobacco Use    Smoking status: Former Smoker   Substance and Sexual Activity    Alcohol use: No    Drug use: No    Sexual activity: Not on file       Review of Systems   Constitutional: Positive for activity change and fatigue.   HENT: Negative.    Respiratory: Negative.    Cardiovascular: Negative.    Musculoskeletal:        Left BKA   Neurological: Positive for facial asymmetry and weakness.   Psychiatric/Behavioral: Negative for agitation and confusion.     Objective:     Vital Signs (Most Recent):  Temp: 97.4 °F (36.3 °C) (04/02/19 1129)  Pulse: 86 (04/02/19 1129)  Resp: 18 (04/02/19 1129)  BP: (!) 149/68 (04/02/19 1129)  SpO2: 96 % (04/02/19 1129) Vital Signs (24h Range):  Temp:  [97.2 °F (36.2 °C)-98.4 °F (36.9 °C)] 97.4 °F (36.3 °C)  Pulse:  [78-89] 86  Resp:  [14-18] 18  SpO2:  [95 %-97 %] 96 %  BP: (122-149)/(65-83) 149/68     Weight: 89.7 kg (197 lb 12 oz)  Body mass index is 30.07 kg/m².    Review of Symptoms  Symptom Assessment (ESAS 0-10  scale)   ESAS 0 1 2 3 4 5 6 7 8 9 10   Pain X             Dyspnea X             Anxiety X             Nausea X             Depression  X             Anorexia X             Fatigue X             Insomnia X             Restlessness  X             Agitation X             CAM / Delirium completed [facility] without  Missing any letters    Constipation     +   Diarrhea           __ --  ___+  Bowel Management Plan (BMP): Yes    Comments: no complaints of pain     Pain Assessment:    OME in 24 hours: 0     Performance Status: 50    ECOG Performance Status Grade: 2 - Ambulates, capable of self care only    Physical Exam   Constitutional: He is oriented to person, place, and time. He appears well-developed and well-nourished. No distress.   Cardiovascular: Normal rate, regular rhythm and normal heart sounds.   Pulmonary/Chest: Effort normal and breath sounds normal.   Abdominal: Soft. Bowel sounds are normal.   Musculoskeletal:   Left bka   Neurological: He is alert and oriented to person, place, and time.   Left sided weakness, delayed in responses   Skin: Skin is warm and dry.   Psychiatric: He has a normal mood and affect. His behavior is normal. Judgment and thought content normal.   Flat affect    Nursing note and vitals reviewed.      Significant Labs: All pertinent labs within the past 24 hours have been reviewed.  CBC:   Recent Labs   Lab 04/02/19  0534   WBC 4.40   HGB 11.6*   HCT 37.5*   MCV 84   *     BMP:  Recent Labs   Lab 04/02/19  0534   GLU 89      K 3.8   *   CO2 22*   BUN 7*   CREATININE 0.9   CALCIUM 9.0   MG 1.8     LFT:  Lab Results   Component Value Date    AST 25 04/02/2019    ALKPHOS 77 04/02/2019    BILITOT 0.3 04/02/2019     Albumin:   Albumin   Date Value Ref Range Status   04/02/2019 2.3 (L) 3.5 - 5.2 g/dL Final     Protein:   Total Protein   Date Value Ref Range Status   04/02/2019 7.0 6.0 - 8.4 g/dL Final     Lactic acid:   Lab Results   Component Value Date    LACTATE 0.8  04/01/2019    LACTATE 2.9 () 03/16/2019       Significant Imaging: I have reviewed all pertinent imaging results/findings within the past 24 hours.    Advance Care Planning   Advanced Directives::  Living Will: No  LaPOST: No  Do Not Resuscitate Status: No  Medical Power of : Yes. Agent's Name: Malvin Osborn [phone].     Decision-Making Capacity: Patient answered questions       Living Arrangements: Lives with family, lives with son Malvin     Psychosocial/Cultural:  lives with son Malvin has 3 children Kasia Hernandes [phone] lives in [place], Balta Osborn lives in [facility]. And one son.  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      Spiritual:     F- Ivory and Belief: Hinduism     I - Importance:   .  C - Community:     A - Address in Care:  Amenable to  visits

## 2019-04-02 NOTE — CONSULTS
"[facility]  Psychiatry  Consult Note    Patient Name: Bari Hogan  MRN: [id number]   Code Status: Full Code  Admission Date: 3/26/2019  Hospital Length of Stay: 7 days  Attending Physician: Franchesca Pablo MD  Primary Care Provider: [facility]    Current Legal Status: N/A    Patient information was obtained from patient, relative(s), past medical records and ER records.   Inpatient consult to Psychiatry  Consult performed by: Jeimy Mckoy DO  Consult ordered by: Nicole Storey MD        Subjective:     Principal Problem:Brain mass    Chief Complaint:  Capacity to refuse biopsy     HPI:   Per Primary MD:  Per H&P: Mr Hogan is a 79 y.o M w/ a history significant for hypertension, CVA, and type 2 diabetes mellitus presented to OSH w/ altered mental status on 03/16/2019.  An MRI of the brain was done which was significant for a left frontal meningeal mass with mild local mass effect and adjacent osseous erosion. The differential includes dural metastasis and atypical meningioma.  There was also a subacute to chronic right temporoparietal infarct.  An EEG was done which was consistent with seizure activity and the patient was loaded with Keppra and this has continued for prophylaxis.  Bone scan revealed lytic lesions in the left frontal bone and vertebral column.  The patient had biopsy of the L2 spine and kyphoplasty.  The results were significant for mild plasmacytosis and patchy mild marrow fibrosis.      Patient is now being transferred to [facility] for a cranial biopsy for appropriate diagnosis and subsequent treatment plan.    Per Psychiatry:   Bari Hogan is a 79 y.o. male with no reported past psychiatric history, currently presenting with Brain mass.  Psychiatry was consulted to assist with capacity determination.     Pt calm and cooperative with interview.  Pt reported that he was feeling his normal self until about 1 wk ago "when I had a stroke on my right " "side."  Pt denied experiencing any symptoms at the time.  Pt confirmed documentation regarding a brain mass.  Pt reported that he was first told about a brain mass 4-5 days ago.  Pt denies any adverse events from the brain mass until now.  He endorses past neurological history of CVA x2 (with left sided symptoms).  He understands that he was transferred for workup of the brain mass.  He understands that the neurosurgical team is planning a biopsy "so they can see what it is."  He reports that the biopsy will assist with guiding treatment.  He understands that refusal of the biopsy could lead to treatment failure and, eventually, death.  He says that risks of undergoing the biopsy include "my going back and forth to the hospital and death."  He consistently voices decision to refuse biopsy, saying "If I'm gonna die, that's God's plan.  I've lived a good life." He denies any psychiatric issues currently.  He reports "great" mood, denies anxiety, SI/HI, hallucinations or post-traumatic symptoms.  He denies ever seeing a psychiatrist or taking psychiatric medications.  He voices appreciation for psychiatric involvement in his care.   Patient reassessed in AM. Oriented x 4, CAM-ICU negative. Reports mood as "alright". Again patient understands the indication/risk/benefit of the brain biopsy and has the capacity to do so.      Collateral:   Spoke with family members, Quincy (sp?) and Aldair at bedside. Corroborate what patient reports above. Also confirm that patient has no previous psychiatric history. Affirm that patient initially came to the hospital confused, but he is now at his baseline. Believe that patient has capacity - "he's got to make his own decision". Encouraged them to set up a family meeting with primary team.     SUBJECTIVE   Currently, the patient is endorsing the following:    Medical Review Of Systems:  All systems reviewed and are negative except as noted above in HPI.    Psychiatric Review Of Systems " - Is patient experiencing or having changes in:  sleep: no  appetite: no  weight: no  energy/anergy: no  interest/pleasure/anhedonia: no  somatic symptoms: no  guilty/hopelessness: no  concentration: no  S.I.B.s/risky behavior: no  SI/SA:  no    anxiety/panic: no  Agoraphobia:  no  Social phobia:  no  Recurrent nightmares:  no  hyper startle response:  no  Avoidance: no  Recurrent thoughts:  no  Recurrent behaviors:  no    Irritability: no  Racing thoughts: no  Impulsive behaviors: no  Pressured speech:  no    Paranoia:no  Delusions: no  AVH:no    Past Medical/Surgical History:  Past Medical History:   Diagnosis Date    Arthritis     Chronic constipation     Diabetes mellitus     Hypertension     Stroke     x2      has a past medical history of Arthritis, Chronic constipation, Diabetes mellitus, Hypertension, and Stroke.  Past Surgical History:   Procedure Laterality Date    Kyphoplasty-L2 and biopsy N/A 3/20/2019    Performed by Sarthak Son MD at [facility] OR       Past Psychiatric History:  Previous Medication Trials: No  Previous Psychiatric Hospitalizations: No  Previous Suicide Attempts: No  History of Violence: No  Outpatient Psychiatrist: No  Outpatient Psychotherapist: No    Social History:  Marital Status:   Children: 3   Employment Status/Info: retired  Education: high school graduate, trade school  Special Ed: no  Housing/Lives with: lives alone in apartment, wife lives in nearby apartment and checks in on pt daily  History of phys/sexual abuse: No  Access to gun: No    Substance Abuse History:  Recreational Drugs: denies  Use of Alcohol: denies  Rehab History:no   Tobacco Use:quit 20 yrs ago    Legal History:  Past Charges/Incarcerations:no  Pending charges:no     Family Psychiatric History:   Denies      Hospital Course: See HPI above         Patient History           Medical as of 4/1/2019     Past Medical History     Diagnosis Date Comments Source    Arthritis -- -- Provider    Chronic  constipation -- -- Provider    Diabetes mellitus -- -- Provider    Hypertension -- -- Provider    Stroke -- x2 Provider                  Surgical as of 4/1/2019     Past Surgical History     Procedure Laterality Date Comments Source    FIXATION KYPHOPLASTY N/A 3/20/2019 Procedure: Kyphoplasty-L2 and biopsy;  Surgeon: Sarthak Son MD;  Location: [facility];  Service: Orthopedics;  Laterality: N/A; Provider                  Family as of 4/1/2019    None           Tobacco Use as of 4/1/2019     Smoking Status Smoking Start Date Smoking Quit Date Packs/Day Years Used    Former Smoker -- -- -- --    Types Comments Smokeless Tobacco Status Smokeless Tobacco Quit Date Source    -- -- Unknown -- Provider            Alcohol Use as of 4/1/2019     Alcohol Use Drinks/Week Alcohol/Week Comments Source    No -- -- -- Provider    Frequency Standard Drinks Binge Drinking        -- -- --              Drug Use as of 4/1/2019     Drug Use Types Frequency Comments Source    No -- -- -- Provider            Sexual Activity as of 4/1/2019     Sexually Active Birth Control Partners Comments Source    -- -- -- -- Provider            Activities of Daily Living as of 4/1/2019    None           Social Documentation as of 4/1/2019    None           Occupational as of 4/1/2019    None           Socioeconomic as of 4/1/2019     Marital Status Spouse Name Number of Children Years Education Education Level Preferred Language Ethnicity Race Source     -- -- -- -- English /Black Black or  --    Financial Resource Strain Food Insecurity: Worry Food Insecurity: Inability Transportation Needs: Medical Transportation Needs: Non-medical    -- -- -- -- --            Pertinent History     Question Response Comments    Lives with -- --    Place in Birth Order -- --    Lives in -- --    Number of Siblings -- --    Raised by -- --    Legal Involvement -- --    Childhood Trauma -- --    Criminal History of -- --     Financial Status -- --    Highest Level of Education -- --    Does patient have access to a firearm? -- --     Service -- --    Primary Leisure Activity -- --    Spirituality -- --        Past Medical History:   Diagnosis Date    Arthritis     Chronic constipation     Diabetes mellitus     Hypertension     Stroke     x2     Past Surgical History:   Procedure Laterality Date    Kyphoplasty-L2 and biopsy N/A 3/20/2019    Performed by Sarthak Son MD at [facility] OR     Family History     None        Tobacco Use    Smoking status: Former Smoker   Substance and Sexual Activity    Alcohol use: No    Drug use: No    Sexual activity: Not on file     Review of patient's allergies indicates:   Allergen Reactions    Lisinopril      dizziness       No current facility-administered medications on file prior to encounter.      Current Outpatient Medications on File Prior to Encounter   Medication Sig    allopurinol (ZYLOPRIM) 100 MG tablet Take 200 mg by mouth once daily.    amLODIPine (NORVASC) 10 MG tablet Take 10 mg by mouth once daily.    diclofenac sodium (VOLTAREN) 1 % Gel Apply 2 g topically daily as needed.    docusate sodium (COLACE) 100 MG capsule Take 1 capsule (100 mg total) by mouth once daily.    empagliflozin (JARDIANCE) 25 mg Tab Take 1 tablet by mouth once daily.    metoprolol succinate (TOPROL-XL) 25 MG 24 hr tablet Take 25 mg by mouth once daily.    acetaminophen (TYLENOL) 325 MG tablet Take 1 tablet (325 mg total) by mouth every 6 (six) hours as needed.    albuterol-ipratropium (DUO-NEB) 2.5 mg-0.5 mg/3 mL nebulizer solution Take 3 mLs by nebulization every 8 (eight) hours. Rescue    apixaban 5 mg Tab Take 1 tablet (5 mg total) by mouth 2 (two) times daily.    atorvastatin (LIPITOR) 40 MG tablet Take 1 tablet (40 mg total) by mouth once daily.    cyproheptadine (PERIACTIN) 4 mg tablet Take 1 tablet (4 mg total) by mouth 3 (three) times daily before meals.    dextrose 50%  "injection Inject 25 mLs (12.5 g total) into the vein as needed (between 51 - 70 mg/dL if patient cannnot take PO but has IV access.).    dextrose 50% injection Inject 50 mLs (25 g total) into the vein as needed (less than 50 mg/dL if patient cannnot take PO but has IV access.).    ergocalciferol (ERGOCALCIFEROL) 50,000 unit Cap Take 1 capsule (50,000 Units total) by mouth every 7 days.    ferrous gluconate (FERGON) 325 MG Tab Take 325 mg by mouth daily with breakfast.    ferrous sulfate (FEOSOL) 325 mg (65 mg iron) Tab tablet Take 1 tablet (325 mg total) by mouth 2 (two) times daily.    insulin aspart U-100 (NOVOLOG) 100 unit/mL injection Inject 1-14 Units into the skin before meals and at bedtime as needed.    levETIRAcetam (KEPPRA) 750 MG Tab Take 1 tablet (750 mg total) by mouth 2 (two) times daily.    ondansetron 4 mg/2 mL Soln Inject 4 mg into the vein every 6 (six) hours as needed.    pantoprazole (PROTONIX) 40 MG tablet Take 1 tablet (40 mg total) by mouth once daily.     Psychotherapeutics (From admission, onward)    None        Review of Systems  Strengths and Liabilities: Strength: Patient accepts guidance/feedback, Strength: Patient has positive support network., Liability: Patient has poor health.    Objective:     Vital Signs (Most Recent):  Temp: 98.4 °F (36.9 °C) (04/01/19 1954)  Pulse: 80 (04/01/19 1954)  Resp: 17 (04/01/19 1954)  BP: 122/65 (04/01/19 1954)  SpO2: 96 % (04/01/19 1954) Vital Signs (24h Range):  Temp:  [97.6 °F (36.4 °C)-98.4 °F (36.9 °C)] 98.4 °F (36.9 °C)  Pulse:  [78-99] 80  Resp:  [14-18] 17  SpO2:  [94 %-98 %] 96 %  BP: (122-147)/(65-85) 122/65     Height: 5' 8" (172.7 cm)  Weight: 89.7 kg (197 lb 12 oz)  Body mass index is 30.07 kg/m².      Intake/Output Summary (Last 24 hours) at 4/1/2019 2017  Last data filed at 4/1/2019 1800  Gross per 24 hour   Intake 1295.83 ml   Output --   Net 1295.83 ml       Physical Exam   Psychiatric:   Mental Status Exam:  Appearance: " "unremarkable, age appropriate, lying in bed  Level of Consciousness: alert  Behavior/Cooperation: friendly and cooperative  Psychomotor: unremarkable   Speech: normal tone, normal rate, normal pitch, normal volume  Language: english, fluid  Orientation: day of wk- Wednesady, Month- March, Year- 2019, City- [place], situation- in [facility]  Attention Span/Concentration: spelled "WORLD" forwards and backwards  Memory: Registers and recalls 3/3 objects at 0 and 5 minutes  Mood: "great"  Affect: euthymic  Thought Process: linear, goal-directed  Associations: normal and logical  Thought Content: normal, no suicidality, no homicidality, delusions, or paranoia  Fund of Knowledge: Aware of current events  Abstraction: proverbs were abstract  Insight: good  Judgment: appropriate for setting        Significant Labs:   Recent Results (from the past 48 hour(s))   POCT glucose    Collection Time: 03/31/19  3:02 PM   Result Value Ref Range    POCT Glucose 108 70 - 110 mg/dL   POCT glucose    Collection Time: 04/01/19 12:05 AM   Result Value Ref Range    POCT Glucose 97 70 - 110 mg/dL   Comprehensive Metabolic Panel (CMP)    Collection Time: 04/01/19  3:19 AM   Result Value Ref Range    Sodium 143 136 - 145 mmol/L    Potassium 3.2 (L) 3.5 - 5.1 mmol/L    Chloride 113 (H) 95 - 110 mmol/L    CO2 18 (L) 23 - 29 mmol/L    Glucose 80 70 - 110 mg/dL    BUN, Bld 11 8 - 23 mg/dL    Creatinine 0.9 0.5 - 1.4 mg/dL    Calcium 8.7 8.7 - 10.5 mg/dL    Total Protein 6.6 6.0 - 8.4 g/dL    Albumin 2.2 (L) 3.5 - 5.2 g/dL    Total Bilirubin 0.2 0.1 - 1.0 mg/dL    Alkaline Phosphatase 73 55 - 135 U/L    AST 19 10 - 40 U/L    ALT 14 10 - 44 U/L    Anion Gap 12 8 - 16 mmol/L    eGFR if African American >60.0 >60 mL/min/1.73 m^2    eGFR if non African American >60.0 >60 mL/min/1.73 m^2   Magnesium    Collection Time: 04/01/19  3:19 AM   Result Value Ref Range    Magnesium 1.6 1.6 - 2.6 mg/dL   Phosphorus    Collection Time: 04/01/19  3:19 AM "   Result Value Ref Range    Phosphorus 2.7 2.7 - 4.5 mg/dL   CBC with Automated Differential    Collection Time: 04/01/19  3:19 AM   Result Value Ref Range    WBC 3.60 (L) 3.90 - 12.70 K/uL    RBC 4.39 (L) 4.60 - 6.20 M/uL    Hemoglobin 11.4 (L) 14.0 - 18.0 g/dL    Hematocrit 36.4 (L) 40.0 - 54.0 %    MCV 83 82 - 98 fL    MCH 26.0 (L) 27.0 - 31.0 pg    MCHC 31.3 (L) 32.0 - 36.0 g/dL    RDW 17.1 (H) 11.5 - 14.5 %    Platelets 406 (H) 150 - 350 K/uL    MPV 11.2 9.2 - 12.9 fL    Immature Granulocytes 0.3 0.0 - 0.5 %    Gran # (ANC) 1.4 (L) 1.8 - 7.7 K/uL    Immature Grans (Abs) 0.01 0.00 - 0.04 K/uL    Lymph # 1.6 1.0 - 4.8 K/uL    Mono # 0.4 0.3 - 1.0 K/uL    Eos # 0.1 0.0 - 0.5 K/uL    Baso # 0.02 0.00 - 0.20 K/uL    nRBC 0 0 /100 WBC    Gran% 39.1 38.0 - 73.0 %    Lymph% 45.6 18.0 - 48.0 %    Mono% 12.2 4.0 - 15.0 %    Eosinophil% 2.2 0.0 - 8.0 %    Basophil% 0.6 0.0 - 1.9 %    Aniso Slight     Poik Slight     Poly Occasional     Hypo Occasional     Ovalocytes Occasional     Large/Giant Platelets Present     Differential Method Automated    APTT    Collection Time: 04/01/19  3:19 AM   Result Value Ref Range    aPTT 65.4 (H) 21.0 - 32.0 sec   POCT glucose    Collection Time: 04/01/19  6:02 AM   Result Value Ref Range    POCT Glucose 92 70 - 110 mg/dL   Lactic acid, plasma    Collection Time: 04/01/19  7:34 AM   Result Value Ref Range    Lactate (Lactic Acid) 0.8 0.5 - 2.2 mmol/L   POCT glucose    Collection Time: 04/01/19 11:33 AM   Result Value Ref Range    POCT Glucose 97 70 - 110 mg/dL   POCT glucose    Collection Time: 04/01/19  5:18 PM   Result Value Ref Range    POCT Glucose 87 70 - 110 mg/dL   APTT    Collection Time: 04/01/19  8:20 PM   Result Value Ref Range    aPTT 25.5 21.0 - 32.0 sec   Protime-INR    Collection Time: 04/01/19  8:20 PM   Result Value Ref Range    Prothrombin Time 10.4 9.0 - 12.5 sec    INR 1.0 0.8 - 1.2   POCT glucose    Collection Time: 04/02/19 12:27 AM   Result Value Ref Range    POCT  Glucose 132 (H) 70 - 110 mg/dL   Comprehensive Metabolic Panel (CMP)    Collection Time: 04/02/19  5:34 AM   Result Value Ref Range    Sodium 140 136 - 145 mmol/L    Potassium 3.8 3.5 - 5.1 mmol/L    Chloride 111 (H) 95 - 110 mmol/L    CO2 22 (L) 23 - 29 mmol/L    Glucose 89 70 - 110 mg/dL    BUN, Bld 7 (L) 8 - 23 mg/dL    Creatinine 0.9 0.5 - 1.4 mg/dL    Calcium 9.0 8.7 - 10.5 mg/dL    Total Protein 7.0 6.0 - 8.4 g/dL    Albumin 2.3 (L) 3.5 - 5.2 g/dL    Total Bilirubin 0.3 0.1 - 1.0 mg/dL    Alkaline Phosphatase 77 55 - 135 U/L    AST 25 10 - 40 U/L    ALT 19 10 - 44 U/L    Anion Gap 7 (L) 8 - 16 mmol/L    eGFR if African American >60.0 >60 mL/min/1.73 m^2    eGFR if non African American >60.0 >60 mL/min/1.73 m^2   Magnesium    Collection Time: 04/02/19  5:34 AM   Result Value Ref Range    Magnesium 1.8 1.6 - 2.6 mg/dL   Phosphorus    Collection Time: 04/02/19  5:34 AM   Result Value Ref Range    Phosphorus 2.1 (L) 2.7 - 4.5 mg/dL   CBC with Automated Differential    Collection Time: 04/02/19  5:34 AM   Result Value Ref Range    WBC 4.40 3.90 - 12.70 K/uL    RBC 4.48 (L) 4.60 - 6.20 M/uL    Hemoglobin 11.6 (L) 14.0 - 18.0 g/dL    Hematocrit 37.5 (L) 40.0 - 54.0 %    MCV 84 82 - 98 fL    MCH 25.9 (L) 27.0 - 31.0 pg    MCHC 30.9 (L) 32.0 - 36.0 g/dL    RDW 17.2 (H) 11.5 - 14.5 %    Platelets 373 (H) 150 - 350 K/uL    MPV 10.9 9.2 - 12.9 fL    Immature Granulocytes 0.5 0.0 - 0.5 %    Gran # (ANC) 2.0 1.8 - 7.7 K/uL    Immature Grans (Abs) 0.02 0.00 - 0.04 K/uL    Lymph # 1.8 1.0 - 4.8 K/uL    Mono # 0.4 0.3 - 1.0 K/uL    Eos # 0.1 0.0 - 0.5 K/uL    Baso # 0.03 0.00 - 0.20 K/uL    nRBC 0 0 /100 WBC    Gran% 45.6 38.0 - 73.0 %    Lymph% 40.5 18.0 - 48.0 %    Mono% 10.0 4.0 - 15.0 %    Eosinophil% 2.7 0.0 - 8.0 %    Basophil% 0.7 0.0 - 1.9 %    Aniso Slight     Poik Slight     Ovalocytes Occasional     Differential Method Automated    Protime-INR    Collection Time: 04/02/19  5:34 AM   Result Value Ref Range     Prothrombin Time 10.6 9.0 - 12.5 sec    INR 1.0 0.8 - 1.2   APTT    Collection Time: 04/02/19  5:34 AM   Result Value Ref Range    aPTT 39.2 (H) 21.0 - 32.0 sec   POCT glucose    Collection Time: 04/02/19  6:12 AM   Result Value Ref Range    POCT Glucose 98 70 - 110 mg/dL      No results found for: PHENYTOIN, PHENOBARB, VALPROATE, CBMZ      Significant Imaging: I have reviewed all pertinent imaging results/findings within the past 24 hours.    Assessment/Plan:     * Brain mass    CAPACITY EVALUATION     Capacity for:  · Refusal of brain biopsy    Capacity for a given decision requires that the patient:  · Understands the proposed treatment and/or the proposed options for his care.  · Appreciates his own medical situation and can abstract how the treatments/proposed options for care apply to his medical situation.  · Understands the consequences of his medical decisions in a reasonable and factual manner supported by the facts and his own values in a consistent fashion.  · Demonstrates the ability to communicate a choice clearly and consistently.    Assessment of capacity:  Pt is a 79yoM w/ no reported past psychiatric history who was transferred from outside hospital for workup of brain mass.  Psychiatry consulted for assistance with capacity determination.  Primary team, neurosurgery, and palliative care feel that pt has capacity.  On my interview, pt able to communicate risks of undergoing and refusing biopsy, able to provide a consistent decision, and able to explain his reasoning.  Would agree that pt has capacity to make decision regarding refusal of biopsy.      Recommendations  Primary team to set up family meeting to coordinate care. Patient states that he is willing to think about the biopsy but is willing to have a discussion involving primary team and family.    Psychiatry will sign off at this time, thank you for the consult.  Psychiatry available for re-consultation if additional assistance with  management is needed in the future.           Total Time:  60 minutes      Case discussed with attending psychiatrist: Dr. Pearson.      Jeimy Mckoy, DO   Psychiatry  [facility]

## 2019-04-02 NOTE — SUBJECTIVE & OBJECTIVE
Interval History: no acute issues, heparin drip resumed last night    Review of Systems  Objective:     Vital Signs (Most Recent):  Temp: 97.4 °F (36.3 °C) (04/02/19 1129)  Pulse: 86 (04/02/19 1129)  Resp: 18 (04/02/19 1129)  BP: (!) 149/68 (04/02/19 1129)  SpO2: 96 % (04/02/19 1129) Vital Signs (24h Range):  Temp:  [97.2 °F (36.2 °C)-98.4 °F (36.9 °C)] 97.4 °F (36.3 °C)  Pulse:  [78-89] 86  Resp:  [14-18] 18  SpO2:  [95 %-97 %] 96 %  BP: (122-149)/(65-83) 149/68     Weight: 89.7 kg (197 lb 12 oz)  Body mass index is 30.07 kg/m².    Intake/Output Summary (Last 24 hours) at 4/2/2019 1453  Last data filed at 4/2/2019 1000  Gross per 24 hour   Intake 665.83 ml   Output 920 ml   Net -254.17 ml      Physical Exam   Constitutional: He is oriented to person, place, and time. He appears well-developed and well-nourished.   HENT:   Head: Normocephalic and atraumatic.   Eyes: Pupils are equal, round, and reactive to light. EOM are normal.   Neck: Normal range of motion. Neck supple.   Cardiovascular: Normal rate and regular rhythm.   Pulmonary/Chest: Effort normal and breath sounds normal.   Abdominal: Soft. Bowel sounds are normal. He exhibits distension. There is no tenderness.   Musculoskeletal: Normal range of motion. He exhibits deformity.   Increased tone/spasticity of the LUE throughout (chronic, dt old stroke)    L LE s/p BKA stable   Neurological: He is alert and oriented to person, place, and time. He displays abnormal reflex. A cranial nerve deficit is present. He exhibits abnormal muscle tone. Coordination abnormal.   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 Correlation with other   laboratory and clinical findings is essential.           Albumin     2.3         Alkaline Phosphatase     77         ALT     19         Anion Gap     7         Aniso     Slight         aPTT 39.0  Comment:  aPTT therapeutic range = 39-69 seconds   39.2  Comment:  aPTT therapeutic range = 39-69 seconds   25.5  Comment:  aPTT therapeutic range = 39-69 seconds     AST     25         Baso #     0.03         Basophil%     0.7         Total Bilirubin     0.3  Comment:  For infants and newborns, interpretation of results should be based  on gestational age, weight and in agreement with clinical  observations.  Premature Infant recommended reference ranges:  Up to 24 hours.............<8.0 mg/dL  Up to 48 hours............<12.0 mg/dL  3-5 days..................<15.0 mg/dL  6-29 days.................<15.0 mg/dL           BUN, Bld     7         Calcium     9.0         Chloride     111         CO2     22         Creatinine     0.9         Differential Method     Automated         eGFR if      >60.0         eGFR if non      >60.0  Comment:  Calculation used to obtain the estimated glomerular filtration  rate (eGFR) is the CKD-EPI equation.            Eos #     0.1         Eosinophil%     2.7         Glucose     89         Gran # (ANC)     2.0         Gran%     45.6         Hematocrit     37.5         Hemoglobin     11.6         Immature Granulocytes     0.5         Coumadin Monitoring INR     1.0  Comment:  Coumadin Therapy:  2.0 - 3.0 for INR for all indicators except mechanical heart valves  and antiphospholipid syndromes which should use 2.5 - 3.5.     1.0  Comment:  Coumadin Therapy:  2.0 - 3.0 for INR for all indicators except mechanical heart valves  and antiphospholipid syndromes which should use 2.5 - 3.5.       Lymph #     1.8         Lymph%     40.5         Magnesium     1.8          MCH     25.9         MCHC     30.9         MCV     84         Mono #     0.4         Mono%     10.0         MPV     10.9         nRBC     0         Ovalocytes     Occasional         Phosphorus     2.1         Platelets     373         POCT Glucose   98   132       Poik     Slight         Potassium     3.8         Total Protein     7.0         Protime     10.6   10.4     RBC     4.48         RDW     17.2         Sodium     140         WBC     4.40                          04/01/19  1718        Immature Grans (Abs)       Albumin       Alkaline Phosphatase       ALT       Anion Gap       Aniso       aPTT       AST       Baso #       Basophil%       Total Bilirubin       BUN, Bld       Calcium       Chloride       CO2       Creatinine       Differential Method       eGFR if        eGFR if non        Eos #       Eosinophil%       Glucose       Gran # (ANC)       Gran%       Hematocrit       Hemoglobin       Immature Granulocytes       Coumadin Monitoring INR       Lymph #       Lymph%       Magnesium       MCH       MCHC       MCV       Mono #       Mono%       MPV       nRBC       Ovalocytes       Phosphorus       Platelets       POCT Glucose 87     Poik       Potassium       Total Protein       Protime       RBC       RDW       Sodium       WBC           All pertinent labs within the past 24 hours have been reviewed.    Significant Imaging: I have reviewed all pertinent imaging results/findings within the past 24 hours.

## 2019-04-02 NOTE — PROGRESS NOTES
[facility]  Palliative Medicine  Progress Note    Patient Name: Bari Hogan  MRN: [id number]  Admission Date: 3/26/2019  Hospital Length of Stay: 7 days  Code Status: Full Code   Attending Provider: Franchesca Pablo MD  Consulting Provider: JODY Juarez  Primary Care Physician: [facility]  Principal Problem:Brain mass    Patient information was obtained from patient and relative(s).      Assessment/Plan:     Palliative care encounter  Palliative care follow up to goals of care.    Palliative care APRN met with patient, son Malvin and grandhelio Quinteros at bedside.  Palliative care introduced to family          Impression:  Mr. 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 He was transferred from outside hospital for medical management of comorbidites prior to having brain biopsy. Biopsy is scheduled for 4/2/19.  He is sitting up in bed, converses freely, alert oriented x4, able to follow commands. CAM assessment negative   He states no pain, shortness of breath or other discomforts.     Advanced Care Planning:   Care Planning   - No advanced directives on file prior to this encounter.   - Advanced care planning education provided.    - States understanding he has a mass in his brain and believes this is related to what is in his spine.    - Discussed resuscitation status:  CPR,  Intubation.    Mr. Hogan states he would not want to have CPR or to have a breathing tube.  Living will not completed at this time as he states he would chose to have a trial of tube feeding if the doctors think it will help him. This option not available on basic living will documents.   - [place]POST in addition to advanced directives  may be beneficial as it addresses trial of artifical feeding and  resuscitation status    - Health care power of  obtained.   "Listed his son Malvin Rollings [phone] and the first decision maker and Lulu Hogan (ex-wife second only if Malvin is not available) [phone].  Explained that his daughters live out of town and may not be available when needed   - Appears to have good understanding and insight regarding  current clinical condition. Capacity has been confirmed by neurosurgery and psychialtry.   -  He states understanding he has a mass in his brain he is concerned about the care he will need after the surgery. Mr. Hogan is  questioning how the surgery will impact his overall outcome. Palliative care recommended he have this discussion with the neuro surgery staff.       Goals of Care:   - Met with patient, son Malvin and grandhelio Quinteros at bedside.  Patient and family are able to recall conversations with the neurosurgery team and are able to state what could possibly happen if he does not agree to have the procedure.   Patient states he has decided to have the biopsy.   - Palliative care explained the plan is to remove the mass and this is done through a craniotomy.    -  He states understanding he has a mass in his brain he is concerned about the care he will need after the surgery. Previously Mr. Hogan has stated understanding this could be cancer.  Mr. Hogan with questions about the discharge plan " If I have this surgery and the mass is cancer, what does this mean for me.  What comes next?   Mr. Hogan is  questioning how the surgery will impact his overall outcome.       -  discussed the risks and benefits of not having the biopsy - not able to develop reasonable treatment, plan, debility, and  death.   - Previously Mr. Hogan states he understands this and accepts the risks associated with not having the biopsy. Further states he is not confused.  He understands not treating this could lead to his death.  States everyone will die one day and we do not have control over this.  I have lived a good life. I don't want anyone " cutting on me again.  I want to spend what is left with my family.   - Son with questions about home health and need for 24 hr care at home as his dad already has debility. As discussed with neurosurgery PA after surgery he may need PT/OT evaluation as well as speech evaluation.  Explained to patient and son  it is too early to know what services he may require and that all of these questions will be addressed. d.    - Palliative care recommends a consult for [facility] whether or not Mr. Hogan has surgery    Palliative care APRN returned to bedside at this time to determine if patient remains amenable to surgery. Mr. Hogan is sleeping peacefully and family is not available.          Plan/Recommendations  - Surgery postponed - pending OR availability.   - Palliative care will continue to follow and assist family in developing realistic goals of care    - Mr. Hogan is a [employer] and established with [facility]. Care will look into [facility] palliative care benefits - will consult with pal. Care LCSW  -  emotional support                     I will follow-up with patient. Please contact us if you have any additional questions.    Subjective:     Chief Complaint: No chief complaint on file.      HPI:   Mr Hogan is a 78 yo gentlman with pmhx of:  hypertension, CVA, a fib  and type 2 diabetes mellitus. He  presented to OSH w/ altered mental status on 03/16/2019.  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   EEG  was consistent with seizure activity, loaded with Keppra and continued for prophylaxis.  Bone scan revealed lytic lesions in the left frontal bone and vertebral column.  The patient had biopsy of the L2 spine and kyphoplasty.  The results were significant for mild plasmacytosis and patchy mild marrow fibrosis.      Mr. Hogan  transferred to  [facility]  for a cranial biopsy for appropriate diagnosis and subsequent treatment plan.    Palliative care consulted for goals of care and advanced care planning            Hospital Course:  No notes on file    Interval History:     Past Medical History:   Diagnosis Date    Arthritis     Chronic constipation     Diabetes mellitus     Hypertension     Stroke     x2       Past Surgical History:   Procedure Laterality Date    Kyphoplasty-L2 and biopsy N/A 3/20/2019    Performed by Sarthak Son MD at [facility]       Review of patient's allergies indicates:   Allergen Reactions    Lisinopril      dizziness       Medications:  Continuous Infusions:   heparin (porcine) in D5W 12 Units/kg/hr (04/02/19 0705)    lactated ringers 125 mL/hr at 04/02/19 0627     Scheduled Meds:   amLODIPine  10 mg Oral Daily    ascorbic acid (vitamin C)  500 mg Oral TID    atorvastatin  40 mg Oral Daily    levETIRAcetam  750 mg Oral BID    metoprolol succinate  12.5 mg Oral Daily    simethicone  1 tablet Oral TID     PRN Meds:acetaminophen, dextrose 50%, glucose, glucose, heparin (PORCINE), heparin (PORCINE), lidocaine HCl 2%, sodium chloride 0.9%    Family History     None        Tobacco Use    Smoking status: Former Smoker   Substance and Sexual Activity    Alcohol use: No    Drug use: No    Sexual activity: Not on file       Review of Systems   Constitutional: Positive for activity change and fatigue.   HENT: Negative.    Respiratory: Negative.    Cardiovascular: Negative.    Musculoskeletal:        Left BKA   Neurological: Positive for facial asymmetry and weakness.   Psychiatric/Behavioral: Negative for agitation and confusion.     Objective:     Vital Signs (Most Recent):  Temp: 97.4 °F (36.3 °C) (04/02/19 1129)  Pulse: 86 (04/02/19 1129)  Resp: 18 (04/02/19 1129)  BP: (!) 149/68 (04/02/19 1129)  SpO2: 96 % (04/02/19 1129) Vital Signs (24h Range):  Temp:  [97.2 °F (36.2 °C)-98.4 °F (36.9 °C)] 97.4 °F (36.3  °C)  Pulse:  [78-89] 86  Resp:  [14-18] 18  SpO2:  [95 %-97 %] 96 %  BP: (122-149)/(65-83) 149/68     Weight: 89.7 kg (197 lb 12 oz)  Body mass index is 30.07 kg/m².    Review of Symptoms  Symptom Assessment (ESAS 0-10 scale)   ESAS 0 1 2 3 4 5 6 7 8 9 10   Pain X             Dyspnea X             Anxiety X             Nausea X             Depression  X             Anorexia X             Fatigue X             Insomnia X             Restlessness  X             Agitation X             CAM / Delirium completed [facility] without  Missing any letters    Constipation     +   Diarrhea           __ --  ___+  Bowel Management Plan (BMP): Yes    Comments: no complaints of pain     Pain Assessment:    OME in 24 hours: 0     Performance Status: 50    ECOG Performance Status Grade: 2 - Ambulates, capable of self care only    Physical Exam   Constitutional: He is oriented to person, place, and time. He appears well-developed and well-nourished. No distress.   Cardiovascular: Normal rate, regular rhythm and normal heart sounds.   Pulmonary/Chest: Effort normal and breath sounds normal.   Abdominal: Soft. Bowel sounds are normal.   Musculoskeletal:   Left bka   Neurological: He is alert and oriented to person, place, and time.   Left sided weakness, delayed in responses   Skin: Skin is warm and dry.   Psychiatric: He has a normal mood and affect. His behavior is normal. Judgment and thought content normal.   Flat affect    Nursing note and vitals reviewed.      Significant Labs: All pertinent labs within the past 24 hours have been reviewed.  CBC:   Recent Labs   Lab 04/02/19  0534   WBC 4.40   HGB 11.6*   HCT 37.5*   MCV 84   *     BMP:  Recent Labs   Lab 04/02/19  0534   GLU 89      K 3.8   *   CO2 22*   BUN 7*   CREATININE 0.9   CALCIUM 9.0   MG 1.8     LFT:  Lab Results   Component Value Date    AST 25 04/02/2019    ALKPHOS 77 04/02/2019    BILITOT 0.3 04/02/2019     Albumin:   Albumin   Date Value Ref Range  Status   04/02/2019 2.3 (L) 3.5 - 5.2 g/dL Final     Protein:   Total Protein   Date Value Ref Range Status   04/02/2019 7.0 6.0 - 8.4 g/dL Final     Lactic acid:   Lab Results   Component Value Date    LACTATE 0.8 04/01/2019    LACTATE 2.9 (HH) 03/16/2019       Significant Imaging: I have reviewed all pertinent imaging results/findings within the past 24 hours.    Advance Care Planning   Advanced Directives::  Living Will: No  LaPOST: No  Do Not Resuscitate Status: No  Medical Power of : Yes. Agent's Name: Malvin Osborn [phone].     Decision-Making Capacity: Patient answered questions       Living Arrangements: Lives with family, lives with son Malvin     Psychosocial/Cultural:  lives with son Malvin has 3 children Kasia Hernandes [phone] lives in [place], Balta Osborn lives in [facility]. And one son.  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.      Spiritual:     F- Ivory and Belief: Latter-day     I - Importance:   .  C - Community:     A - Address in Care:  Amenable to  visits       > 50% of 35  min visit spent in chart review, face to face discussion of goals of care,  symptom assessment, coordination of care and emotional support.    JESSICA White, ACNS-BC  Palliative Medicine  [facility]

## 2019-04-02 NOTE — PLAN OF CARE
Problem: Adult Inpatient Plan of Care  Goal: Plan of Care Review  Outcome: Ongoing (interventions implemented as appropriate)     04/02/19 1623   Plan of Care Review   Plan of Care Reviewed With patient;son   Progress no change       Problem: Skin Injury Risk Increased  Goal: Skin Health and Integrity    Intervention: Optimize Skin Protection     04/02/19 0803 04/02/19 1619 04/02/19 1623   Prevent Additional Skin Injury   Head of Bed (HOB)  --  HOB at 20-30 degrees  --    Pressure Reduction Devices  --   --  heel offloading device utilized;foam padding utilized   Pressure Reduction Techniques weight shift assistance provided;frequent weight shift encouraged;heels elevated off bed  --   --    Monitor and Manage Hypervolemia   Skin Protection incontinence pads utilized  --   --          Problem: Coping Ineffective  Goal: Effective Coping    Intervention: Support and Enhance Coping Strategies     04/02/19 1623   Prevent or Manage Pain   Complementary Therapy music therapy   Monitor/Manage Chemotherapy Gastrointestinal Effects   Environmental Support calm environment promoted;rest periods encouraged

## 2019-04-02 NOTE — SUBJECTIVE & OBJECTIVE
Patient History           Medical as of 4/1/2019     Past Medical History     Diagnosis Date Comments Source    Arthritis -- -- Provider    Chronic constipation -- -- Provider    Diabetes mellitus -- -- Provider    Hypertension -- -- Provider    Stroke -- x2 Provider                  Surgical as of 4/1/2019     Past Surgical History     Procedure Laterality Date Comments Source    FIXATION KYPHOPLASTY N/A 3/20/2019 Procedure: Kyphoplasty-L2 and biopsy;  Surgeon: Sarthak Son MD;  Location: [facility];  Service: Orthopedics;  Laterality: N/A; Provider                  Family as of 4/1/2019    None           Tobacco Use as of 4/1/2019     Smoking Status Smoking Start Date Smoking Quit Date Packs/Day Years Used    Former Smoker -- -- -- --    Types Comments Smokeless Tobacco Status Smokeless Tobacco Quit Date Source    -- -- Unknown -- Provider            Alcohol Use as of 4/1/2019     Alcohol Use Drinks/Week Alcohol/Week Comments Source    No -- -- -- Provider    Frequency Standard Drinks Binge Drinking        -- -- --              Drug Use as of 4/1/2019     Drug Use Types Frequency Comments Source    No -- -- -- Provider            Sexual Activity as of 4/1/2019     Sexually Active Birth Control Partners Comments Source    -- -- -- -- Provider            Activities of Daily Living as of 4/1/2019    None           Social Documentation as of 4/1/2019    None           Occupational as of 4/1/2019    None           Socioeconomic as of 4/1/2019     Marital Status Spouse Name Number of Children Years Education Education Level Preferred Language Ethnicity Race Source     -- -- -- -- English /Black Black or  --    Financial Resource Strain Food Insecurity: Worry Food Insecurity: Inability Transportation Needs: Medical Transportation Needs: Non-medical    -- -- -- -- --            Pertinent History     Question Response Comments    Lives with -- --    Place in Birth Order --  --    Lives in -- --    Number of Siblings -- --    Raised by -- --    Legal Involvement -- --    Childhood Trauma -- --    Criminal History of -- --    Financial Status -- --    Highest Level of Education -- --    Does patient have access to a firearm? -- --     Service -- --    Primary Leisure Activity -- --    Spirituality -- --        Past Medical History:   Diagnosis Date    Arthritis     Chronic constipation     Diabetes mellitus     Hypertension     Stroke     x2     Past Surgical History:   Procedure Laterality Date    Kyphoplasty-L2 and biopsy N/A 3/20/2019    Performed by [facility]ak Son [facility] at [facility]     Family History     None        Tobacco Use    Smoking status: Former Smoker   Substance and Sexual Activity    Alcohol use: No    Drug use: No    Sexual activity: Not on file     Review of patient's allergies indicates:   Allergen Reactions    Lisinopril      dizziness       No current facility-administered medications on file prior to encounter.      Current Outpatient Medications on File Prior to Encounter   Medication Sig    allopurinol (ZYLOPRIM) 100 MG tablet Take 200 mg by mouth once daily.    amLODIPine (NORVASC) 10 MG tablet Take 10 mg by mouth once daily.    diclofenac sodium (VOLTAREN) 1 % Gel Apply 2 g topically daily as needed.    docusate sodium (COLACE) 100 MG capsule Take 1 capsule (100 mg total) by mouth once daily.    empagliflozin (JARDIANCE) 25 mg Tab Take 1 tablet by mouth once daily.    metoprolol succinate (TOPROL-XL) 25 MG 24 hr tablet Take 25 mg by mouth once daily.    acetaminophen (TYLENOL) 325 MG tablet Take 1 tablet (325 mg total) by mouth every 6 (six) hours as needed.    albuterol-ipratropium (DUO-NEB) 2.5 mg-0.5 mg/3 mL nebulizer solution Take 3 mLs by nebulization every 8 (eight) hours. Rescue    apixaban 5 mg Tab Take 1 tablet (5 mg total) by mouth 2 (two) times daily.    atorvastatin (LIPITOR) 40 MG tablet Take 1 tablet (40 mg total) by  "mouth once daily.    cyproheptadine (PERIACTIN) 4 mg tablet Take 1 tablet (4 mg total) by mouth 3 (three) times daily before meals.    dextrose 50% injection Inject 25 mLs (12.5 g total) into the vein as needed (between 51 - 70 mg/dL if patient cannnot take PO but has IV access.).    dextrose 50% injection Inject 50 mLs (25 g total) into the vein as needed (less than 50 mg/dL if patient cannnot take PO but has IV access.).    ergocalciferol (ERGOCALCIFEROL) 50,000 unit Cap Take 1 capsule (50,000 Units total) by mouth every 7 days.    ferrous gluconate (FERGON) 325 MG Tab Take 325 mg by mouth daily with breakfast.    ferrous sulfate (FEOSOL) 325 mg (65 mg iron) Tab tablet Take 1 tablet (325 mg total) by mouth 2 (two) times daily.    insulin aspart U-100 (NOVOLOG) 100 unit/mL injection Inject 1-14 Units into the skin before meals and at bedtime as needed.    levETIRAcetam (KEPPRA) 750 MG Tab Take 1 tablet (750 mg total) by mouth 2 (two) times daily.    ondansetron 4 mg/2 mL Soln Inject 4 mg into the vein every 6 (six) hours as needed.    pantoprazole (PROTONIX) 40 MG tablet Take 1 tablet (40 mg total) by mouth once daily.     Psychotherapeutics (From admission, onward)    None        Review of Systems  Strengths and Liabilities: Strength: Patient accepts guidance/feedback, Strength: Patient has positive support network., Liability: Patient has poor health.    Objective:     Vital Signs (Most Recent):  Temp: 98.4 °F (36.9 °C) (04/01/19 1954)  Pulse: 80 (04/01/19 1954)  Resp: 17 (04/01/19 1954)  BP: 122/65 (04/01/19 1954)  SpO2: 96 % (04/01/19 1954) Vital Signs (24h Range):  Temp:  [97.6 °F (36.4 °C)-98.4 °F (36.9 °C)] 98.4 °F (36.9 °C)  Pulse:  [78-99] 80  Resp:  [14-18] 17  SpO2:  [94 %-98 %] 96 %  BP: (122-147)/(65-85) 122/65     Height: 5' 8" (172.7 cm)  Weight: 89.7 kg (197 lb 12 oz)  Body mass index is 30.07 kg/m².      Intake/Output Summary (Last 24 hours) at 4/1/2019 2017  Last data filed at 4/1/2019 " "1800  Gross per 24 hour   Intake 1295.83 ml   Output --   Net 1295.83 ml       Physical Exam   Psychiatric:   Mental Status Exam:  Appearance: unremarkable, age appropriate, lying in bed  Level of Consciousness: alert  Behavior/Cooperation: friendly and cooperative  Psychomotor: unremarkable   Speech: normal tone, normal rate, normal pitch, normal volume  Language: english, fluid  Orientation: day of wk- Wednesady, Month- March, Year- 2019, City- [place], situation- in [facility]  Attention Span/Concentration: spelled "WORLD" forwards and backwards  Memory: Registers and recalls 3/3 objects at 0 and 5 minutes  Mood: "great"  Affect: euthymic  Thought Process: linear, goal-directed  Associations: normal and logical  Thought Content: normal, no suicidality, no homicidality, delusions, or paranoia  Fund of Knowledge: Aware of current events  Abstraction: proverbs were abstract  Insight: good  Judgment: appropriate for setting        Significant Labs:   Recent Results (from the past 48 hour(s))   POCT glucose    Collection Time: 03/31/19  3:02 PM   Result Value Ref Range    POCT Glucose 108 70 - 110 mg/dL   POCT glucose    Collection Time: 04/01/19 12:05 AM   Result Value Ref Range    POCT Glucose 97 70 - 110 mg/dL   Comprehensive Metabolic Panel (CMP)    Collection Time: 04/01/19  3:19 AM   Result Value Ref Range    Sodium 143 136 - 145 mmol/L    Potassium 3.2 (L) 3.5 - 5.1 mmol/L    Chloride 113 (H) 95 - 110 mmol/L    CO2 18 (L) 23 - 29 mmol/L    Glucose 80 70 - 110 mg/dL    BUN, Bld 11 8 - 23 mg/dL    Creatinine 0.9 0.5 - 1.4 mg/dL    Calcium 8.7 8.7 - 10.5 mg/dL    Total Protein 6.6 6.0 - 8.4 g/dL    Albumin 2.2 (L) 3.5 - 5.2 g/dL    Total Bilirubin 0.2 0.1 - 1.0 mg/dL    Alkaline Phosphatase 73 55 - 135 U/L    AST 19 10 - 40 U/L    ALT 14 10 - 44 U/L    Anion Gap 12 8 - 16 mmol/L    eGFR if African American >60.0 >60 mL/min/1.73 m^2    eGFR if non African American >60.0 >60 mL/min/1.73 m^2   Magnesium    " Collection Time: 04/01/19  3:19 AM   Result Value Ref Range    Magnesium 1.6 1.6 - 2.6 mg/dL   Phosphorus    Collection Time: 04/01/19  3:19 AM   Result Value Ref Range    Phosphorus 2.7 2.7 - 4.5 mg/dL   CBC with Automated Differential    Collection Time: 04/01/19  3:19 AM   Result Value Ref Range    WBC 3.60 (L) 3.90 - 12.70 K/uL    RBC 4.39 (L) 4.60 - 6.20 M/uL    Hemoglobin 11.4 (L) 14.0 - 18.0 g/dL    Hematocrit 36.4 (L) 40.0 - 54.0 %    MCV 83 82 - 98 fL    MCH 26.0 (L) 27.0 - 31.0 pg    MCHC 31.3 (L) 32.0 - 36.0 g/dL    RDW 17.1 (H) 11.5 - 14.5 %    Platelets 406 (H) 150 - 350 K/uL    MPV 11.2 9.2 - 12.9 fL    Immature Granulocytes 0.3 0.0 - 0.5 %    Gran # (ANC) 1.4 (L) 1.8 - 7.7 K/uL    Immature Grans (Abs) 0.01 0.00 - 0.04 K/uL    Lymph # 1.6 1.0 - 4.8 K/uL    Mono # 0.4 0.3 - 1.0 K/uL    Eos # 0.1 0.0 - 0.5 K/uL    Baso # 0.02 0.00 - 0.20 K/uL    nRBC 0 0 /100 WBC    Gran% 39.1 38.0 - 73.0 %    Lymph% 45.6 18.0 - 48.0 %    Mono% 12.2 4.0 - 15.0 %    Eosinophil% 2.2 0.0 - 8.0 %    Basophil% 0.6 0.0 - 1.9 %    Aniso Slight     Poik Slight     Poly Occasional     Hypo Occasional     Ovalocytes Occasional     Large/Giant Platelets Present     Differential Method Automated    APTT    Collection Time: 04/01/19  3:19 AM   Result Value Ref Range    aPTT 65.4 (H) 21.0 - 32.0 sec   POCT glucose    Collection Time: 04/01/19  6:02 AM   Result Value Ref Range    POCT Glucose 92 70 - 110 mg/dL   Lactic acid, plasma    Collection Time: 04/01/19  7:34 AM   Result Value Ref Range    Lactate (Lactic Acid) 0.8 0.5 - 2.2 mmol/L   POCT glucose    Collection Time: 04/01/19 11:33 AM   Result Value Ref Range    POCT Glucose 97 70 - 110 mg/dL   POCT glucose    Collection Time: 04/01/19  5:18 PM   Result Value Ref Range    POCT Glucose 87 70 - 110 mg/dL   APTT    Collection Time: 04/01/19  8:20 PM   Result Value Ref Range    aPTT 25.5 21.0 - 32.0 sec   Protime-INR    Collection Time: 04/01/19  8:20 PM   Result Value Ref Range     Prothrombin Time 10.4 9.0 - 12.5 sec    INR 1.0 0.8 - 1.2   POCT glucose    Collection Time: 04/02/19 12:27 AM   Result Value Ref Range    POCT Glucose 132 (H) 70 - 110 mg/dL   Comprehensive Metabolic Panel (CMP)    Collection Time: 04/02/19  5:34 AM   Result Value Ref Range    Sodium 140 136 - 145 mmol/L    Potassium 3.8 3.5 - 5.1 mmol/L    Chloride 111 (H) 95 - 110 mmol/L    CO2 22 (L) 23 - 29 mmol/L    Glucose 89 70 - 110 mg/dL    BUN, Bld 7 (L) 8 - 23 mg/dL    Creatinine 0.9 0.5 - 1.4 mg/dL    Calcium 9.0 8.7 - 10.5 mg/dL    Total Protein 7.0 6.0 - 8.4 g/dL    Albumin 2.3 (L) 3.5 - 5.2 g/dL    Total Bilirubin 0.3 0.1 - 1.0 mg/dL    Alkaline Phosphatase 77 55 - 135 U/L    AST 25 10 - 40 U/L    ALT 19 10 - 44 U/L    Anion Gap 7 (L) 8 - 16 mmol/L    eGFR if African American >60.0 >60 mL/min/1.73 m^2    eGFR if non African American >60.0 >60 mL/min/1.73 m^2   Magnesium    Collection Time: 04/02/19  5:34 AM   Result Value Ref Range    Magnesium 1.8 1.6 - 2.6 mg/dL   Phosphorus    Collection Time: 04/02/19  5:34 AM   Result Value Ref Range    Phosphorus 2.1 (L) 2.7 - 4.5 mg/dL   CBC with Automated Differential    Collection Time: 04/02/19  5:34 AM   Result Value Ref Range    WBC 4.40 3.90 - 12.70 K/uL    RBC 4.48 (L) 4.60 - 6.20 M/uL    Hemoglobin 11.6 (L) 14.0 - 18.0 g/dL    Hematocrit 37.5 (L) 40.0 - 54.0 %    MCV 84 82 - 98 fL    MCH 25.9 (L) 27.0 - 31.0 pg    MCHC 30.9 (L) 32.0 - 36.0 g/dL    RDW 17.2 (H) 11.5 - 14.5 %    Platelets 373 (H) 150 - 350 K/uL    MPV 10.9 9.2 - 12.9 fL    Immature Granulocytes 0.5 0.0 - 0.5 %    Gran # (ANC) 2.0 1.8 - 7.7 K/uL    Immature Grans (Abs) 0.02 0.00 - 0.04 K/uL    Lymph # 1.8 1.0 - 4.8 K/uL    Mono # 0.4 0.3 - 1.0 K/uL    Eos # 0.1 0.0 - 0.5 K/uL    Baso # 0.03 0.00 - 0.20 K/uL    nRBC 0 0 /100 WBC    Gran% 45.6 38.0 - 73.0 %    Lymph% 40.5 18.0 - 48.0 %    Mono% 10.0 4.0 - 15.0 %    Eosinophil% 2.7 0.0 - 8.0 %    Basophil% 0.7 0.0 - 1.9 %    Aniso Slight     Poik Slight      Ovalocytes Occasional     Differential Method Automated    Protime-INR    Collection Time: 04/02/19  5:34 AM   Result Value Ref Range    Prothrombin Time 10.6 9.0 - 12.5 sec    INR 1.0 0.8 - 1.2   APTT    Collection Time: 04/02/19  5:34 AM   Result Value Ref Range    aPTT 39.2 (H) 21.0 - 32.0 sec   POCT glucose    Collection Time: 04/02/19  6:12 AM   Result Value Ref Range    POCT Glucose 98 70 - 110 mg/dL      No results found for: PHENYTOIN, PHENOBARB, VALPROATE, CBMZ      Significant Imaging: I have reviewed all pertinent imaging results/findings within the past 24 hours.

## 2019-04-02 NOTE — ASSESSMENT & PLAN NOTE
Cannot rule out the possibility of embolic event      - repeat EKG  - continue metoprolol 12.5mg for rate control   - CHADSvasc 7,pt refused surgery 4/1, heparin resumed,

## 2019-04-02 NOTE — ASSESSMENT & PLAN NOTE
MRI of the brain with contrast revealed:  1. 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  Differential includes dural metastasis an atypical meningioma.  2. 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    Left frontal meningeal mass has been present on imaging since July 2018.     ?? Whether the left frontal meningeal mass with bony erosion is something other than the vertebral lesion @ L2. The L2 biopsy results are as follows:   - Mild plasmacytosis.   - Patchy mild marrow fibrosis.   - Focal crush artifact.   - Results of outside consultative review and final diagnosis to follow  in an addendum report.    Pt refused surgery on 4/1, determined to have capacity to make medical decisions  After meeting w family and discussion w family, pt then agreed to surgery on 4/2. NSG notified, pending OR capacity.

## 2019-04-02 NOTE — PLAN OF CARE
Discussion with patient and family this morning. Patient has also met with staff, Dr. Campoverde. He does not wish to proceed with surgery at this time, understands all risks associated with not undergoing surgery. Patient deemed capable of making own medical decisions. No complaints on exam today. All questions by family at bedside answered. Please call with questions. Re-consult as needed. NSGY will sign off.       Savita Perez PA-C  Pager: [phone]  Neurosurgery  [facility]

## 2019-04-02 NOTE — PLAN OF CARE
Problem: Adult Inpatient Plan of Care  Goal: Plan of Care Review  Outcome: Ongoing (interventions implemented as appropriate)  Greeted patient and gained consent for nursing care. Prepped and made comfortable for the night. Bed on lowest position, locked. Repositioned him in bed. No complaint of pain. Assisted in his needs. Will continue to monitor.

## 2019-04-03 ENCOUNTER — TELEPHONE (OUTPATIENT)
Dept: NEUROSURGERY | Facility: CLINIC | Age: 79
End: 2019-04-03

## 2019-04-03 VITALS
OXYGEN SATURATION: 98 % | WEIGHT: 197.75 LBS | HEART RATE: 83 BPM | DIASTOLIC BLOOD PRESSURE: 60 MMHG | BODY MASS INDEX: 29.97 KG/M2 | TEMPERATURE: 98 F | RESPIRATION RATE: 17 BRPM | HEIGHT: 68 IN | SYSTOLIC BLOOD PRESSURE: 107 MMHG

## 2019-04-03 DIAGNOSIS — G93.89 BRAIN MASS: Primary | ICD-10-CM

## 2019-04-03 PROBLEM — Z01.818 PRE-OP EVALUATION: Status: RESOLVED | Noted: 2019-03-31 | Resolved: 2019-04-03

## 2019-04-03 LAB
ALBUMIN SERPL BCP-MCNC: 2.2 G/DL (ref 3.5–5.2)
ALP SERPL-CCNC: 78 U/L (ref 55–135)
ALT SERPL W/O P-5'-P-CCNC: 16 U/L (ref 10–44)
ANION GAP SERPL CALC-SCNC: 10 MMOL/L (ref 8–16)
ANISOCYTOSIS BLD QL SMEAR: SLIGHT
APTT BLDCRRT: 44 SEC (ref 21–32)
APTT BLDCRRT: 44 SEC (ref 21–32)
AST SERPL-CCNC: 21 U/L (ref 10–40)
BASOPHILS # BLD AUTO: 0.03 K/UL (ref 0–0.2)
BASOPHILS NFR BLD: 0.6 % (ref 0–1.9)
BILIRUB SERPL-MCNC: 0.2 MG/DL (ref 0.1–1)
BUN SERPL-MCNC: 6 MG/DL (ref 8–23)
CALCIUM SERPL-MCNC: 8.8 MG/DL (ref 8.7–10.5)
CHLORIDE SERPL-SCNC: 110 MMOL/L (ref 95–110)
CO2 SERPL-SCNC: 21 MMOL/L (ref 23–29)
CREAT SERPL-MCNC: 1 MG/DL (ref 0.5–1.4)
DIFFERENTIAL METHOD: ABNORMAL
EOSINOPHIL # BLD AUTO: 0.1 K/UL (ref 0–0.5)
EOSINOPHIL NFR BLD: 2.5 % (ref 0–8)
ERYTHROCYTE [DISTWIDTH] IN BLOOD BY AUTOMATED COUNT: 17 % (ref 11.5–14.5)
EST. GFR  (AFRICAN AMERICAN): >60 ML/MIN/1.73 M^2
EST. GFR  (NON AFRICAN AMERICAN): >60 ML/MIN/1.73 M^2
GLUCOSE SERPL-MCNC: 156 MG/DL (ref 70–110)
HCT VFR BLD AUTO: 37 % (ref 40–54)
HGB BLD-MCNC: 11.8 G/DL (ref 14–18)
HYPOCHROMIA BLD QL SMEAR: ABNORMAL
IMM GRANULOCYTES # BLD AUTO: 0.03 K/UL (ref 0–0.04)
IMM GRANULOCYTES NFR BLD AUTO: 0.6 % (ref 0–0.5)
LYMPHOCYTES # BLD AUTO: 1.7 K/UL (ref 1–4.8)
LYMPHOCYTES NFR BLD: 34.8 % (ref 18–48)
MAGNESIUM SERPL-MCNC: 1.6 MG/DL (ref 1.6–2.6)
MCH RBC QN AUTO: 26.5 PG (ref 27–31)
MCHC RBC AUTO-ENTMCNC: 31.9 G/DL (ref 32–36)
MCV RBC AUTO: 83 FL (ref 82–98)
MONOCYTES # BLD AUTO: 0.4 K/UL (ref 0.3–1)
MONOCYTES NFR BLD: 8.8 % (ref 4–15)
NEUTROPHILS # BLD AUTO: 2.5 K/UL (ref 1.8–7.7)
NEUTROPHILS NFR BLD: 52.7 % (ref 38–73)
NRBC BLD-RTO: 0 /100 WBC
OVALOCYTES BLD QL SMEAR: ABNORMAL
PHOSPHATE SERPL-MCNC: 2.7 MG/DL (ref 2.7–4.5)
PLATELET # BLD AUTO: 364 K/UL (ref 150–350)
PMV BLD AUTO: 11.1 FL (ref 9.2–12.9)
POIKILOCYTOSIS BLD QL SMEAR: SLIGHT
POLYCHROMASIA BLD QL SMEAR: ABNORMAL
POTASSIUM SERPL-SCNC: 3.1 MMOL/L (ref 3.5–5.1)
PROT SERPL-MCNC: 6.6 G/DL (ref 6–8.4)
RBC # BLD AUTO: 4.45 M/UL (ref 4.6–6.2)
SODIUM SERPL-SCNC: 141 MMOL/L (ref 136–145)
WBC # BLD AUTO: 4.77 K/UL (ref 3.9–12.7)

## 2019-04-03 PROCEDURE — 25000003 PHARM REV CODE 250: Performed by: HOSPITALIST

## 2019-04-03 PROCEDURE — 36415 COLL VENOUS BLD VENIPUNCTURE: CPT

## 2019-04-03 PROCEDURE — 25000003 PHARM REV CODE 250: Performed by: INTERNAL MEDICINE

## 2019-04-03 PROCEDURE — 99239 PR HOSPITAL DISCHARGE DAY,>30 MIN: ICD-10-PCS | Mod: GC,,, | Performed by: HOSPITALIST

## 2019-04-03 PROCEDURE — 92507 TX SP LANG VOICE COMM INDIV: CPT

## 2019-04-03 PROCEDURE — 63600175 PHARM REV CODE 636 W HCPCS: Performed by: STUDENT IN AN ORGANIZED HEALTH CARE EDUCATION/TRAINING PROGRAM

## 2019-04-03 PROCEDURE — 85730 THROMBOPLASTIN TIME PARTIAL: CPT

## 2019-04-03 PROCEDURE — 99233 SBSQ HOSP IP/OBS HIGH 50: CPT | Mod: ,,, | Performed by: CLINICAL NURSE SPECIALIST

## 2019-04-03 PROCEDURE — 99239 HOSP IP/OBS DSCHRG MGMT >30: CPT | Mod: GC,,, | Performed by: HOSPITALIST

## 2019-04-03 PROCEDURE — 80053 COMPREHEN METABOLIC PANEL: CPT

## 2019-04-03 PROCEDURE — 85025 COMPLETE CBC W/AUTO DIFF WBC: CPT

## 2019-04-03 PROCEDURE — 25000003 PHARM REV CODE 250: Performed by: STUDENT IN AN ORGANIZED HEALTH CARE EDUCATION/TRAINING PROGRAM

## 2019-04-03 PROCEDURE — 83735 ASSAY OF MAGNESIUM: CPT

## 2019-04-03 PROCEDURE — 99233 PR SUBSEQUENT HOSPITAL CARE,LEVL III: ICD-10-PCS | Mod: ,,, | Performed by: CLINICAL NURSE SPECIALIST

## 2019-04-03 PROCEDURE — 92526 ORAL FUNCTION THERAPY: CPT

## 2019-04-03 PROCEDURE — 84100 ASSAY OF PHOSPHORUS: CPT

## 2019-04-03 RX ORDER — IPRATROPIUM BROMIDE AND ALBUTEROL SULFATE 2.5; .5 MG/3ML; MG/3ML
3 SOLUTION RESPIRATORY (INHALATION) EVERY 8 HOURS
Qty: 1 BOX | Refills: 0 | Status: ON HOLD | OUTPATIENT
Start: 2019-04-03 | End: 2019-04-26 | Stop reason: SDUPTHER

## 2019-04-03 RX ORDER — FERROUS SULFATE 325(65) MG
325 TABLET ORAL 2 TIMES DAILY
Qty: 30 TABLET | Refills: 3 | Status: SHIPPED | OUTPATIENT
Start: 2019-04-03

## 2019-04-03 RX ORDER — ATORVASTATIN CALCIUM 40 MG/1
40 TABLET, FILM COATED ORAL DAILY
Qty: 90 TABLET | Refills: 3 | Status: SHIPPED | OUTPATIENT
Start: 2019-04-03 | End: 2020-01-01

## 2019-04-03 RX ORDER — PANTOPRAZOLE SODIUM 40 MG/1
40 TABLET, DELAYED RELEASE ORAL DAILY
Qty: 30 TABLET | Refills: 11 | Status: SHIPPED | OUTPATIENT
Start: 2019-04-03 | End: 2020-01-01

## 2019-04-03 RX ORDER — POTASSIUM CHLORIDE 20 MEQ/15ML
40 SOLUTION ORAL ONCE
Status: COMPLETED | OUTPATIENT
Start: 2019-04-03 | End: 2019-04-03

## 2019-04-03 RX ORDER — SENNOSIDES 8.6 MG/1
8.6 TABLET ORAL DAILY
Status: DISCONTINUED | OUTPATIENT
Start: 2019-04-03 | End: 2019-04-03

## 2019-04-03 RX ORDER — ERGOCALCIFEROL 1.25 MG/1
50000 CAPSULE ORAL
Qty: 7 CAPSULE | Refills: 0 | Status: SHIPPED | OUTPATIENT
Start: 2019-04-03

## 2019-04-03 RX ORDER — LEVETIRACETAM 750 MG/1
750 TABLET ORAL 2 TIMES DAILY
Qty: 60 TABLET | Refills: 11 | Status: ON HOLD | OUTPATIENT
Start: 2019-04-03 | End: 2020-01-01

## 2019-04-03 RX ORDER — ALLOPURINOL 100 MG/1
200 TABLET ORAL DAILY
Qty: 30 TABLET | Refills: 4 | Status: ON HOLD | OUTPATIENT
Start: 2019-04-03 | End: 2019-04-26 | Stop reason: HOSPADM

## 2019-04-03 RX ORDER — METOPROLOL SUCCINATE 25 MG/1
25 TABLET, EXTENDED RELEASE ORAL DAILY
Qty: 90 TABLET | Refills: 0 | Status: ON HOLD | OUTPATIENT
Start: 2019-04-03 | End: 2019-01-01 | Stop reason: HOSPADM

## 2019-04-03 RX ORDER — BISACODYL 10 MG
10 SUPPOSITORY, RECTAL RECTAL DAILY
Status: DISCONTINUED | OUTPATIENT
Start: 2019-04-03 | End: 2019-04-04 | Stop reason: HOSPADM

## 2019-04-03 RX ORDER — AMLODIPINE BESYLATE 10 MG/1
10 TABLET ORAL DAILY
Qty: 30 TABLET | Refills: 3 | Status: SHIPPED | OUTPATIENT
Start: 2019-04-03 | End: 2020-01-01 | Stop reason: SDUPTHER

## 2019-04-03 RX ORDER — DOCUSATE SODIUM 100 MG/1
100 CAPSULE, LIQUID FILLED ORAL 2 TIMES DAILY
Refills: 0 | Status: ON HOLD
Start: 2019-04-03 | End: 2019-04-26 | Stop reason: SDUPTHER

## 2019-04-03 RX ADMIN — APIXABAN 5 MG: 5 TABLET, FILM COATED ORAL at 08:04

## 2019-04-03 RX ADMIN — POTASSIUM CHLORIDE 40 MEQ: 20 SOLUTION ORAL at 03:04

## 2019-04-03 RX ADMIN — SIMETHICONE CHEW TAB 80 MG 80 MG: 80 TABLET ORAL at 09:04

## 2019-04-03 RX ADMIN — ATORVASTATIN CALCIUM 40 MG: 20 TABLET, FILM COATED ORAL at 09:04

## 2019-04-03 RX ADMIN — DOCUSATE SODIUM 1 ENEMA: 283 LIQUID RECTAL at 11:04

## 2019-04-03 RX ADMIN — LEVETIRACETAM 750 MG: 750 TABLET, FILM COATED ORAL at 08:04

## 2019-04-03 RX ADMIN — OXYCODONE HYDROCHLORIDE AND ACETAMINOPHEN 500 MG: 500 TABLET ORAL at 03:04

## 2019-04-03 RX ADMIN — HEPARIN SODIUM AND DEXTROSE 12 UNITS/KG/HR: 10000; 5 INJECTION INTRAVENOUS at 07:04

## 2019-04-03 RX ADMIN — BISACODYL 10 MG: 10 SUPPOSITORY RECTAL at 12:04

## 2019-04-03 RX ADMIN — LEVETIRACETAM 750 MG: 750 TABLET, FILM COATED ORAL at 09:04

## 2019-04-03 RX ADMIN — OXYCODONE HYDROCHLORIDE AND ACETAMINOPHEN 500 MG: 500 TABLET ORAL at 09:04

## 2019-04-03 RX ADMIN — METOPROLOL SUCCINATE 12.5 MG: 25 TABLET, EXTENDED RELEASE ORAL at 09:04

## 2019-04-03 RX ADMIN — AMLODIPINE BESYLATE 10 MG: 10 TABLET ORAL at 09:04

## 2019-04-03 RX ADMIN — SIMETHICONE CHEW TAB 80 MG 80 MG: 80 TABLET ORAL at 03:04

## 2019-04-03 RX ADMIN — SIMETHICONE CHEW TAB 80 MG 80 MG: 80 TABLET ORAL at 08:04

## 2019-04-03 RX ADMIN — OXYCODONE HYDROCHLORIDE AND ACETAMINOPHEN 500 MG: 500 TABLET ORAL at 08:04

## 2019-04-03 RX ADMIN — POTASSIUM CHLORIDE 40 MEQ: 20 SOLUTION ORAL at 11:04

## 2019-04-03 NOTE — ASSESSMENT & PLAN NOTE
As noted on CT abd pelvis.  Source of these metastases is unclear.  Cannot rule out the possibility of CNS metastasis as well. Bone scan showing left frontal meningeal mass and lytic lesion on L2 consistent metastasis. There was also no lesions/mass seen in the chest which could indicate possible primary source in the  tract or thyroid. Thyroid ultrasound with no evidence of malignancy   - s/p L2 lesion pathology findings showing a 5-10% representation of plasma cells. This could be secondary to an abnormal reaction to something or the beginnings of multiple myeloma. The small percentage doesn't qualify for MM but it could be a result of such diseases including lymphoma or  waldenstom's macroglobulinemia. Patient can also have isolated plasmacytoma in the brain. If such is diagnosed then the treatment may require localized radiation.     - He is agreement with no surgery and follow up as outpatient.

## 2019-04-03 NOTE — PLAN OF CARE
Problem: SLP Goal  Goal: SLP Goal  Speech Language Pathology Goals  Goals expected to be met by 4/5/19:    1.  Pt will tolerate Mechanical Soft diet w/ thin liquids w/ adequate oral clearance and no overt signs of aspiration.  2.Pt will complete OMEs x10 w/ SLP model to enhance oral motor function   3. Pt will complete cancellation tasks w/ 80 acc independently to enhance visual spatial skills      Outcome: Ongoing (interventions implemented as appropriate)  Pt participated in tx tasks.  Cont ST per POC.    Jennifer Messer CCC-SLP  4/3/2019

## 2019-04-03 NOTE — PT/OT/SLP PROGRESS
"Speech Language Pathology Treatment    Patient Name:  Bari Hogan   MRN:  [id number]  Admitting Diagnosis: Brain mass    Recommendations:                 General Recommendations:  Dysphagia therapy and Cognitive-linguistic therapy  Diet recommendations:  Mechanical soft, Liquid Diet Level: Thin   Aspiration Precautions: 1 bite/sip at a time, HOB to 90 degrees, Meds whole 1 at a time, Monitor for s/s of aspiration, Small bites/sips and Standard aspiration precautions   General Precautions: Standard, aspiration, fall  Communication strategies:  none    Subjective     "I speak clear enough to be understood.  I just want to go home!"  Pt stated  Patient goals: to go home     Pain/Comfort:  · Pain Rating 1: 0/10  · Pain Rating Post-Intervention 1: 0/10    Objective:     Has the patient been evaluated by SLP for swallowing?   Yes  Keep patient NPO? No   Current Respiratory Status: room air      Pt was awake and alert in NAD.  He was agreeable to tx session.  He completed basic oral motor ex's x5 reps for lingual protrusion and lateralization and labial retraction and protrusion given min cues.  Pt was unable to complete lingual elevation ex;s, though speech production was clear.  He was seated upright for optimal swallow safety.  He was offered and accepted po trials of thin liquids by cup edge.  He tolerated all trials well w/ no overt signs of airway threat.  Education was provided to pt re: basic oral motor ex;s, speech strategies and aspiration precautions.  He indicated good understanding.  Tx session discontinued per pt request as pt received call from family member that he wanted to take.    Assessment:     Bari Hogan is a 79 y.o. male with an SLP diagnosis of Dysphagia, Dysarthria and Cognitive-Linguistic Impairment.  He presents with good progress towards goals.    Goals:   Multidisciplinary Problems     SLP Goals        Problem: SLP Goal    Goal Priority Disciplines Outcome   SLP Goal     SLP Ongoing " (interventions implemented as appropriate)   Description:  Speech Language Pathology Goals  Goals expected to be met by 4/5/19:    1.  Pt will tolerate Mechanical Soft diet w/ thin liquids w/ adequate oral clearance and no overt signs of aspiration.  2.Pt will complete OMEs x10 w/ SLP model to enhance oral motor function   3. Pt will complete cancellation tasks w/ 80 acc independently to enhance visual spatial skills                       Plan:     · Patient to be seen:  3 x/week   · Plan of Care expires:  04/27/19  · Plan of Care reviewed with:  patient   · SLP Follow-Up:  Yes       Discharge recommendations:  nursing facility, skilled   Barriers to Discharge:  Decreased Care Giver Support    Time Tracking:     SLP Treatment Date:   04/03/19  Speech Start Time:  1047  Speech Stop Time:  1100     Speech Total Time (min):  13 min    Billable Minutes: Speech Therapy Individual 13    Jennifer Messer CCC-SLP  04/03/2019

## 2019-04-03 NOTE — ASSESSMENT & PLAN NOTE
Palliative care consulted for goals of care.  Palliative care APRN met with patient at bedside.  Palliative care introduced.    No family is present at this time.     Impression:  Mr. 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 He was transferred from outside hospital for medical management of comorbidites prior to having brain biopsy. Biopsy is scheduled for 4/2/19.  He is sitting up in bed, converses freely, alert oriented x4, able to follow commands. CAM assessment negative   He states no pain, shortness of breath or other discomforts.     Advanced Care Planning:   Advance Care Planning   - No advanced directives on file prior to this encounter.   - Advanced care planning education provided.    - States understanding he has a mass in his brain and believes this is related to what is in his spine.    - Discussed resuscitation status:  CPR,  Intubation.    Mr. Hogan states he would not want to have CPR or to have a breathing tube.  Living will not completed at this time as he states he would chose to have a trial of tube feeding if the doctors think it will help him. This option not available on basic living will documents.   - [place]POST in addition to advanced directives  may be beneficial as it addresses trial of artifical feeding and  resuscitation status    - Health care power of  obtained.  Listed his son Malvin Ibrahim [phone] and the first decision maker and Lulu Hogan (ex-wife second only if Malvin is not available) [phone].  Explained that his daughters live out of town and may not be available when needed   - Appears to have good understanding and insight regarding  current clinical condition. Capacity has been confirmed by neurosurgery and psychialtry.   -  He states understanding he has a mass in his brain he is concerned about the care he will need after the  surgery. Mr. Hogan is  questioning how the surgery will impact his overall outcome.         Goals of Care:   - Patient reports having discussed risks and benefits of surgery as well as alternative options with the neurosurgery team. Mr. Hogan has elected not to have the surgery.  He will receive close follow up in the neurosurgery clinic. Mr. Hogan' son Malvin as documented, has agreed to honor his father's wishes.   - Readdressed resuscitation status.  Mr. Hogan remains amenable to having DNR order.    - Mr. Hogan will be discharged with home health and advanced illness management.  [facility] also [facility]          Plan/Recommendations  - Mr. Hogan amenable to DNR order - to be written by primary team.   - Recommend LaPOST - palliative care will assist patient in completing.  Will require staff MD  signature   - Mr. Hogan is not amenable to making transition to hospice at this time.   -  Recommend home health with [facility] will continue to assist with hospice when appropriate with Mr. Hogan   - Mr. Hogan is a [employer] and established with [facility]. Care LCSW will complete referral   - emotional support     Primary team aware of the above goals of care.

## 2019-04-03 NOTE — PROGRESS NOTES
[facility]  Palliative Medicine  Progress Note    Patient Name: Bari Hogan  MRN: [id number]  Admission Date: 3/26/2019  Hospital Length of Stay: 8 days  Code Status: DNR   Attending Provider: Franchesca Pablo MD  Consulting Provider: JODY Juarez  Primary Care Physician: [facility]  Principal Problem:Brain mass    Patient information was obtained from patient.      Assessment/Plan:     Palliative care encounter  Palliative care consulted for goals of care.  Palliative care APRN met with patient at bedside.  Palliative care introduced.    No family is present at this time.     Impression:  Mr. 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 He was transferred from outside hospital for medical management of comorbidites prior to having brain biopsy. Biopsy is scheduled for 4/2/19.  He is sitting up in bed, converses freely, alert oriented x4, able to follow commands. CAM assessment negative   He states no pain, shortness of breath or other discomforts.     Advanced Care Planning:   Care Planning   - No advanced directives on file prior to this encounter.   - Advanced care planning education provided.    - States understanding he has a mass in his brain and believes this is related to what is in his spine.    - Discussed resuscitation status:  CPR,  Intubation.    Mr. Hogan states he would not want to have CPR or to have a breathing tube.  Living will not completed at this time as he states he would chose to have a trial of tube feeding if the doctors think it will help him. This option not available on basic living will documents.   - LaPOST in addition to advanced directives  may be beneficial as it addresses trial of artifical feeding and  resuscitation status    - Health care power of  obtained.  Listed his son Malvin Ibrahim  [phone] and the first decision maker and Lulu Hogan (ex-wife second only if Malvin is not available) [phone].  Explained that his daughters live out of town and may not be available when needed   - Appears to have good understanding and insight regarding  current clinical condition. Capacity has been confirmed by neurosurgery and psychialtry.   -  He states understanding he has a mass in his brain he is concerned about the care he will need after the surgery. Mr. Hogan is  questioning how the surgery will impact his overall outcome.         Goals of Care:   - Patient reports having discussed risks and benefits of surgery as well as alternative options with the neurosurgery team. Mr. Hogan has elected not to have the surgery.  He will receive close follow up in the neurosurgery clinic. Mr. Hogan' son Malvin as documented, has agreed to honor his father's wishes.   - Readdressed resuscitation status.  Mr. Hogan remains amenable to having DNR order.    - Mr. Hogan will be discharged with home health and advanced illness management.  [facility]         Plan/Recommendations  - Mr. Hogan amenable to DNR order - to be written by primary team.   - Recommend LaPOST - Mr. Hogan has signed the LaPOST, and staff physician is required to sign.    - Please give patient the gold copy of the LaPOST and obtain copy for EMR   - Mr. Hogan is not amenable to making transition to hospice at this time.   -  Recommend home health with [facility] will continue to assist with hospice when appropriate with Mr. Hogan   - Mr. Hogan is a [employer] and established with [facility]. Care LCSW will complete referral   - emotional support     Primary team and RN case manager aware of the above goals of care.                     I will follow-up with patient. Please contact us if you have any additional questions.    Subjective:     Chief Complaint: No  chief complaint on file.      HPI:   Mr Hogan is a 78 yo gentlman with pmhx of:  hypertension, CVA, a fib  and type 2 diabetes mellitus. He  presented to OSH w/ altered mental status on 03/16/2019.  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   EEG  was consistent with seizure activity, loaded with Keppra and continued for prophylaxis.  Bone scan revealed lytic lesions in the left frontal bone and vertebral column.  The patient had biopsy of the L2 spine and kyphoplasty.  The results were significant for mild plasmacytosis and patchy mild marrow fibrosis.      Mr. Hogan  transferred to [facility]  for a cranial biopsy for appropriate diagnosis and subsequent treatment plan.    Palliative care consulted for goals of care and advanced care planning            Hospital Course:  No notes on file    Interval History:     Past Medical History:   Diagnosis Date    Arthritis     Chronic constipation     Diabetes mellitus     Hypertension     Stroke     x2       Past Surgical History:   Procedure Laterality Date    Kyphoplasty-L2 and biopsy N/A 3/20/2019    Performed by Sarthak Son MD at [facility] OR       Review of patient's allergies indicates:   Allergen Reactions    Lisinopril      dizziness       Medications:  Continuous Infusions:   heparin (porcine) in D5W 12 Units/kg/hr (04/03/19 0716)     Scheduled Meds:   amLODIPine  10 mg Oral Daily    ascorbic acid (vitamin C)  500 mg Oral TID    atorvastatin  40 mg Oral Daily    bisacodyl  10 mg Rectal Daily    docusate sodium  1 enema Rectal Daily    levETIRAcetam  750 mg Oral BID    metoprolol succinate  12.5 mg Oral Daily    potassium chloride 10%  40 mEq Oral Once    simethicone  1 tablet Oral TID     PRN Meds:acetaminophen, dextrose 50%, glucose, glucose, heparin (PORCINE), heparin (PORCINE),  lidocaine HCl 2%, sodium chloride 0.9%    Family History     None        Tobacco Use    Smoking status: Former Smoker   Substance and Sexual Activity    Alcohol use: No    Drug use: No    Sexual activity: Not on file       Review of Systems   Constitutional: Positive for activity change and fatigue.   HENT: Negative.    Respiratory: Negative.    Cardiovascular: Negative.    Musculoskeletal:        Left BKA   Neurological: Positive for facial asymmetry and weakness.   Psychiatric/Behavioral: Negative for agitation and confusion.     Objective:     Vital Signs (Most Recent):  Temp: 97.1 °F (36.2 °C) (04/03/19 0818)  Pulse: 81 (04/03/19 0818)  Resp: 16 (04/03/19 0818)  BP: 139/88 (04/03/19 0818)  SpO2: (!) 94 % (04/03/19 0818) Vital Signs (24h Range):  Temp:  [97.1 °F (36.2 °C)-98.8 °F (37.1 °C)] 97.1 °F (36.2 °C)  Pulse:  [75-82] 81  Resp:  [14-18] 16  SpO2:  [94 %-96 %] 94 %  BP: (118-147)/(57-88) 139/88     Weight: 89.7 kg (197 lb 12 oz)  Body mass index is 30.07 kg/m².    Review of Symptoms  Symptom Assessment (ESAS 0-10 scale)   ESAS 0 1 2 3 4 5 6 7 8 9 10   Pain X             Dyspnea X             Anxiety X             Nausea X             Depression  X             Anorexia X             Fatigue X             Insomnia X             Restlessness  X             Agitation X             CAM / Delirium completed [facility] without  Missing any letters    Constipation     +   Diarrhea           __ --  ___+  Bowel Management Plan (BMP): Yes    Comments: no complaints of pain     Pain Assessment:    OME in 24 hours: 0     Performance Status: 50    ECOG Performance Status Grade: 2 - Ambulates, capable of self care only    Physical Exam   Constitutional: He is oriented to person, place, and time. He appears well-developed and well-nourished. No distress.   Cardiovascular: Normal rate, regular rhythm and normal heart sounds.   Pulmonary/Chest: Effort normal and breath sounds normal.   Abdominal: Soft. Bowel sounds are  normal.   Musculoskeletal:   Left bka   Neurological: He is alert and oriented to person, place, and time.   Left sided weakness, delayed in responses   Skin: Skin is warm and dry.   Psychiatric: He has a normal mood and affect. His behavior is normal. Judgment and thought content normal.   Flat affect    Nursing note and vitals reviewed.      Significant Labs: All pertinent labs within the past 24 hours have been reviewed.  CBC:   Recent Labs   Lab 04/03/19  0529   WBC 4.77   HGB 11.8*   HCT 37.0*   MCV 83   *     BMP:  Recent Labs   Lab 04/03/19  0529   *      K 3.1*      CO2 21*   BUN 6*   CREATININE 1.0   CALCIUM 8.8   MG 1.6     LFT:  Lab Results   Component Value Date    AST 21 04/03/2019    ALKPHOS 78 04/03/2019    BILITOT 0.2 04/03/2019     Albumin:   Albumin   Date Value Ref Range Status   04/03/2019 2.2 (L) 3.5 - 5.2 g/dL Final     Protein:   Total Protein   Date Value Ref Range Status   04/03/2019 6.6 6.0 - 8.4 g/dL Final     Lactic acid:   Lab Results   Component Value Date    LACTATE 0.8 04/01/2019    LACTATE 2.9 (HH) 03/16/2019       Significant Imaging: I have reviewed all pertinent imaging results/findings within the past 24 hours.    Advance Care Planning   Advanced Directives::  Living Will: No  LaPOST: No  Do Not Resuscitate Status: No  Medical Power of : Yes. Agent's Name: Malvin Osborn [phone].     Decision-Making Capacity: Patient answered questions       Living Arrangements: Lives with family, lives with son Malvin     Psychosocial/Cultural:  lives with son Malvin has 3 children Kasia Hernandes [phone] lives in [place], Balta Osborn lives in [place]. And one son.  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      Spiritual:     F- Ivory and Belief: Advent     I - Importance:   .  C - Community:     A - Address in Care:  Amenable to  visits       > 50% of 35  min visit  spent in chart review, face to face discussion of goals of care,  symptom assessment, coordination of care and emotional support.    JESSICA White, ACNS-BC, OCN   Palliative Medicine  [facility]

## 2019-04-03 NOTE — TELEPHONE ENCOUNTER
----- Message from Savita Perez PA-C sent at 4/3/2019  8:41 AM CDT -----  Can you set this patient up for follow-up in 3 months with MRI brain w wo contrast?    Thanks,  Savita

## 2019-04-03 NOTE — DISCHARGE SUMMARY
[facility] Hospital Medicine  Discharge Summary      Patient Name: Bari Hogan  MRN: [id number]  Admission Date: 3/26/2019  Hospital Length of Stay: 8 days  Discharge Date and Time:  04/03/2019 2:27 PM  Attending Physician: Franchesca Pablo MD   Discharging Provider: Justino Jameson MD  Primary Care Provider: [facility] Medicine Team: [facility] HOSP MED 3 Justino Jameson MD    HPI:   Mr Hogan is a 79 y.o M w/ a history significant for hypertension, CVA, and type 2 diabetes mellitus presented to [facility] w/ altered mental status on 03/16/2019.  An MRI of the brain was done which was significant for a left frontal meningeal mass with mild local mass effect and adjacent osseous erosion. The differential includes dural metastasis and atypical meningioma.  There was also a subacute to chronic right temporoparietal infarct.  An EEG was done which was consistent with seizure activity and the patient was loaded with Keppra and this has continued for prophylaxis.  Bone scan revealed lytic lesions in the left frontal bone and vertebral column.  The patient had biopsy of the L2 spine and kyphoplasty.  The results were significant for mild plasmacytosis and patchy mild marrow fibrosis.     Patient is now being transferred to [facility] for medical management of co-morbidities while patient awaits brain biopsy by neurosurgery        Procedure(s) (LRB):  BIOPSY left frontal intracranial lesion Stealth without fudicials (Left)      Hospital Course:   3/27: MRI performed, continue heparin drip, continue keppra  3/28: continue heparin drip, pt reported some back stool last night, but morning h/h even rised, will continue to watch,   3/29: BM still dark, h/h stable, likely due to iron supplement causing it, continue heparin drip, got bolus for subtherapeutic aPTT  3/30: No acute events. Patient awaits brain biopsy per neurosurgery. pt had some colon distention on KUB; mild abdominal pain resolved w  tylenol, no nausea or vomiting; NGT attempted and pt refused and could not tolerate  3/31: NGT attempted again w urojet and pt refused and could not tolerate  4/1:   Pt denies abdominal pain, passed BM about 2 nights ago, passed flatus yesterday, NPO, IVF, heparin drip stopped 24hrs prior to surgery tmr; later in the afternoon, pt refused surgery, heparin drip resumed at night  4/2:  Pt initially still declining surgery in the morning, after meeting with family, he changed his mind and agreeable to surgery, NSG notified.  4/3: after discussion with NSG, pt still refuse surgery,agreed to have him follow up in clinic w NSG and monitor tumor growth and behavior with serial MRIs and make a decision at a later time. Paliative is on board as well, He is agreement with no surgery and follow up as outpatient. He also wish to be DNR. Apixaban was resumed and ok per NSG. He will be discharged in stable condition.     Consults:   Consults (From admission, onward)        Status Ordering Provider     Inpatient consult to Neurosurgery  Once     Provider:  (Not yet assigned)    Acknowledged SUDARSHAN BATISTA     Inpatient consult to Palliative Care  Once     Provider:  (Not yet assigned)    Completed TRAN QUICK     Inpatient consult to Psychiatry  Once     Provider:  (Not yet assigned)    Completed TRAN QUICK     IP consult to case management  Once     Provider:  (Not yet assigned)    Completed IRENE ROMERO          * Brain mass  MRI of the brain with contrast revealed:  1. 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  Differential includes dural metastasis an atypical meningioma.  2. 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    Left frontal meningeal mass has been present on imaging since July 2018.     ?? Whether the left frontal meningeal mass with bony erosion is something other than the vertebral lesion @ L2. The L2 biopsy results are as follows:   - Mild  plasmacytosis.   - Patchy mild marrow fibrosis.   - Focal crush artifact.   - Results of outside consultative review and final diagnosis to follow  in an addendum report.    Pt refused surgery on 4/1, determined to have capacity to make medical decisions  After discussion with NS, pt still refuse surgery,agreed to have him follow up in [facility] [place] [facility] and monitor tumor growth and behavior with serial MRIs and make a decision at a later time. Paliative is on board as well, He is agreement with no surgery and follow up as outpatient. He also wish to be DNR. Apixaban was resumed and ok per NSG. He will be discharged in stable condition.    Goals of care, counseling/discussion  [facility]:    - Mr. Hogan amenable to DNR order - to be written by primary team.   - Recommend [facility] - palliative care will assist patient in completing.  Will require staff MD  signature   - Mr. Hogan is not amenable to making transition to hospice at this time.   -  Recommend home health with [facility] will continue to assist with hospice when appropriate with Mr. Hogan   - Mr. Hogan is a [employer] and established with [facility]. Care LCSW will complete referral   - emotional support           Dilatation of colon  Likely due to immobility, frailty and ileus  Bowel regimen      Alteration in skin integrity  Wound care consulted      Seizures  EEG revealing hemispheric slowing and frequent sharp waves consistent with seizure activity. No previous history of seizures reported. The new onset could be  acute infarct or secondary to frontal lobe mass     - continue Keppra  - seizure precautions           Metastatic carcinoma to bone  As noted on CT abd pelvis.  Source of these metastases is unclear.  Cannot rule out the possibility of CNS metastasis as well. Bone scan showing left frontal meningeal mass and lytic lesion on L2 consistent metastasis. There was also no lesions/mass seen in the chest  which could indicate possible primary source in the  tract or thyroid. Thyroid ultrasound with no evidence of malignancy   - s/p L2 lesion pathology findings showing a 5-10% representation of plasma cells. This could be secondary to an abnormal reaction to something or the beginnings of multiple myeloma. The small percentage doesn't qualify for MM but it could be a result of such diseases including lymphoma or  waldenstom's macroglobulinemia. Patient can also have isolated plasmacytoma in the brain. If such is diagnosed then the treatment may require localized radiation.     - He is agreement with no surgery and follow up as outpatient.             Type 2 diabetes mellitus with diabetic peripheral angiopathy without gangrene, without long-term current use of insulin  - resume home meds and home checks  - dysphagia diet      Normocytic anemia  Anemia workup was ordered showing iron deficiency anemia most likely secondary to malignancy and poor intake.     - continue iron supplementation             Cerebrovascular accident (CVA) due to embolism of right middle cerebral artery  Patient with known previous CVA with deficits. CT head showing  Acute/subacute right temporoparietal infarct with mild local mass effect and small amount of faith-infarct hemorrhage posteriorly.    - seizure precautions  w keprra            Atrial fibrillation  Cannot rule out the possibility of embolic event      - repeat EKG  - continue metoprolol 12.5mg for rate control   - CHADSvasc 7,pt refused surgery 4/1,   after discussion w NSG, will have him follow up in clinic and monitor tumor growth and behavior with serial MRIs and make a decision at a later time.  He is agreement with no surgery and follow up as outpatient.  -    Apixaban was resumed and ok per NSG. He will be discharged in stable condition.            Pre-op evaluation-resolved as of 4/3/2019    ** Cardiovascular Risk Assessment:  Non-emergent surgery.  Cardiac problems (No  unstable angina, decompensated heart failure). Atrial fibrillation and currently on AC  Surgery is Intermediate risk Head and neck surgery  Functional Status: Left BKA; wheelchair bound     Revised Cardiac Risk Index   1. History of ischemic heart disease   2. History of congestive heart failure   3. History of cerebrovascular disease (stroke or transient ischemic attack)   4. History of diabetes requiring preoperative insulin use   5. Chronic kidney disease (creatinine > 2 mg/dL)   6. Undergoing suprainguinal vascular, intraperitoneal, or intrathoracic surgery     Risk for cardiac death, nonfatal myocardial infarction, and nonfatal cardiac arrest      0 predictors = 0.4%, 1 predictor = 0.9%, 2 predictors = 6.6%, ?3 predictors = >11%     RCRI Calculator Class and Risk percentage:   1 predictors = 0.4 %                       Anticoagulation:  will hold anticoagulation 24 hours prior to surgery      Coronary Stenting: None  Preop cardiovascular exam     Surgical Risk Assessment   Active cardiac issues:     Active decompensated heart failure? No   Unstable angina?  No   Significant uncontrolled arrhythmias? No   Severe valvular heart disease-Aortic or Mitral Stenosis? No   Recent MI or coronary revascularization < 30 days? No      Cardiac Risk Factors  History of CAD/ischemic heart disease? No   History of cerebrovascular disease? Yes   History of compensated heart failure? No   Type 2 diabetes requiring insulin? No   Serum Creatinine > 2? No   Total cardiac risk factors 1      Recommendation:  Patient is intermediate risk for intermediate risk surgery.             Final Active Diagnoses:    Diagnosis Date Noted POA    PRINCIPAL PROBLEM:  Brain mass [G93.9] 03/26/2019 Yes    Goals of care, counseling/discussion [Z71.89]  Not Applicable    Palliative care encounter [Z51.5] 04/01/2019 Not Applicable    Dilatation of colon [K59.39] 03/31/2019 Unknown    Alteration in skin integrity [R23.9] 03/27/2019 Yes     Peripheral vascular disease of lower extremity [I73.9] 03/27/2019 Yes    Seizures [R56.9] 03/19/2019 Yes    Metastatic carcinoma to bone [C79.51] 03/17/2019 Yes    Normocytic anemia [D64.9] 03/17/2019 Yes    Type 2 diabetes mellitus with diabetic peripheral angiopathy without gangrene, without long-term current use of insulin [E11.51] 03/17/2019 Yes    Nephrotic range proteinuria [R80.9] 03/17/2019 Yes    Cerebrovascular accident (CVA) due to embolism of right middle cerebral artery [I63.411] 07/10/2018 Yes    Atrial fibrillation [I48.91] 07/09/2018 Yes      Problems Resolved During this Admission:    Diagnosis Date Noted Date Resolved POA    Pre-op evaluation [Z01.818] 03/31/2019 04/03/2019 Not Applicable       Discharged Condition: stable    Disposition: Home or Self Care    Follow Up:  Follow-up Information     Call [facility]    [facility]  Contact information:  [address] [place] [id number]  [phone]             Teja [facility] - Neurosurgery 7th Fl On 5/21/2019.    Specialty:  Neurosurgery  Why:  Tuesday 5/21 - MRI of the brain @ 10:30am and appt w/ Dr Campoverde @ 11:40am  Contact information:  [address]  [facility] [address] [phone]  Additional information:  7th Floor               Patient Instructions:      Ambulatory Referral to Oncology   Referral Priority: Routine Referral Type: Consultation   Referral Reason: Specialty Services Required   Requested Specialty: Oncology   Number of Visits Requested: 1     Diet Adult Regular     Notify your health care provider if you experience any of the following:  temperature >100.4     Notify your health care provider if you experience any of the following:  persistent nausea and vomiting or diarrhea     Notify your health care provider if you experience any of the following:  severe uncontrolled pain     Notify your health care provider if you experience any of the  following:  redness, tenderness, or signs of infection (pain, swelling, redness, odor or green/yellow discharge around incision site)     Notify your health care provider if you experience any of the following:  difficulty breathing or increased cough     Notify your health care provider if you experience any of the following:  severe persistent headache     Notify your health care provider if you experience any of the following:  worsening rash     Notify your health care provider if you experience any of the following:  persistent dizziness, light-headedness, or visual disturbances     Notify your health care provider if you experience any of the following:  increased confusion or weakness     Activity as tolerated       Significant Diagnostic Studies: Labs:   BMP:   Recent Labs   Lab 04/02/19  0534 04/03/19  0529   GLU 89 156*    141   K 3.8 3.1*   * 110   CO2 22* 21*   BUN 7* 6*   CREATININE 0.9 1.0   CALCIUM 9.0 8.8   MG 1.8 1.6   , CMP   Recent Labs   Lab 04/02/19  0534 04/03/19 0529    141   K 3.8 3.1*   * 110   CO2 22* 21*   GLU 89 156*   BUN 7* 6*   CREATININE 0.9 1.0   CALCIUM 9.0 8.8   PROT 7.0 6.6   ALBUMIN 2.3* 2.2*   BILITOT 0.3 0.2   ALKPHOS 77 78   AST 25 21   ALT 19 16   ANIONGAP 7* 10   ESTGFRAFRICA >60.0 >60.0   EGFRNONAA >60.0 >60.0   , CBC   Recent Labs   Lab 04/02/19 0534 04/03/19 0529   WBC 4.40 4.77   HGB 11.6* 11.8*   HCT 37.5* 37.0*   * 364*   , INR   Lab Results   Component Value Date    INR 1.0 04/02/2019    INR 1.0 04/01/2019    INR 1.0 03/27/2019   , Lipid Panel   Lab Results   Component Value Date    CHOL 319 (H) 03/18/2019    HDL 42 03/18/2019    LDLCALC 207 (H) 03/18/2019    TRIG 90 03/18/2019    CHOLHDL 13.2 (L) 03/18/2019   , Troponin No results for input(s): TROPONINI in the last 168 hours., A1C:   Recent Labs   Lab 03/27/19  0224   HGBA1C 8.4*    and All labs within the past 24 hours have been reviewed  Microbiology:   Blood Culture   Lab Results    Component Value Date    LABBLOO No growth after 5 days. 03/16/2019   , Sputum Culture No results found for: GSRESP, RESPIRATORYC, Urine Culture  No results found for: LABURIN     Pending Diagnostic Studies:     None         Medications:  Reconciled Home Medications:      Medication List      CHANGE how you take these medications    docusate sodium 100 MG capsule  Commonly known as:  COLACE  Take 1 capsule (100 mg total) by mouth once daily.  What changed:  Another medication with the same name was removed. Continue taking this medication, and follow the directions you see here.        CONTINUE taking these medications    acetaminophen 325 MG tablet  Commonly known as:  TYLENOL  Take 1 tablet (325 mg total) by mouth every 6 (six) hours as needed.     albuterol-ipratropium 2.5 mg-0.5 mg/3 mL nebulizer solution  Commonly known as:  DUO-NEB  Take 3 mLs by nebulization every 8 (eight) hours. Rescue     allopurinol 100 MG tablet  Commonly known as:  ZYLOPRIM  Take 200 mg by mouth once daily.     amLODIPine 10 MG tablet  Commonly known as:  NORVASC  Take 10 mg by mouth once daily.     apixaban 5 mg Tab  Commonly known as:  ELIQUIS  Take 1 tablet (5 mg total) by mouth 2 (two) times daily.     atorvastatin 40 MG tablet  Commonly known as:  LIPITOR  Take 1 tablet (40 mg total) by mouth once daily.     cyproheptadine 4 mg tablet  Commonly known as:  PERIACTIN  Take 1 tablet (4 mg total) by mouth 3 (three) times daily before meals.     ergocalciferol 50,000 unit Cap  Commonly known as:  ERGOCALCIFEROL  Take 1 capsule (50,000 Units total) by mouth every 7 days.     ferrous sulfate 325 mg (65 mg iron) Tab tablet  Commonly known as:  FEOSOL  Take 1 tablet (325 mg total) by mouth 2 (two) times daily.     levETIRAcetam 750 MG Tab  Commonly known as:  KEPPRA  Take 1 tablet (750 mg total) by mouth 2 (two) times daily.     metoprolol succinate 25 MG 24 hr tablet  Commonly known as:  TOPROL-XL  Take 25 mg by mouth once daily.      pantoprazole 40 MG tablet  Commonly known as:  PROTONIX  Take 1 tablet (40 mg total) by mouth once daily.     VOLTAREN 1 % Gel  Generic drug:  diclofenac sodium  Apply 2 g topically daily as needed.        STOP taking these medications    dextrose 50% injection     empagliflozin 25 mg Tab  Commonly known as:  JARDIANCE     ferrous gluconate 325 MG Tab  Commonly known as:  FERGON     insulin aspart U-100 100 unit/mL injection  Commonly known as:  NOVOLOG     ondansetron 4 mg/2 mL Soln            Indwelling Lines/Drains at time of discharge:   Lines/Drains/Airways          None          Time spent on the discharge of patient: 35 minutes  Patient was seen and examined on the date of discharge and determined to be suitable for discharge.         Justino Jameson MD  Department of Hospital Medicine  [facility]

## 2019-04-03 NOTE — PLAN OF CARE
Problem: Adult Inpatient Plan of Care  Goal: Plan of Care Review  Outcome: Ongoing (interventions implemented as appropriate)  Greeted patient and gained consent for nursing care. POC reviewed, verbalized understanding. Prepped and made comfortable for the night. Bed on lowest position, locked. Call bell within reach. Regularly repositioned in bed. No complaint of pain. Assisted in his needs. Will continue to monitor.

## 2019-04-03 NOTE — ASSESSMENT & PLAN NOTE
MRI of the brain with contrast revealed:  1. 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  Differential includes dural metastasis an atypical meningioma.  2. 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    Left frontal meningeal mass has been present on imaging since July 2018.     ?? Whether the left frontal meningeal mass with bony erosion is something other than the vertebral lesion @ L2. The L2 biopsy results are as follows:   - Mild plasmacytosis.   - Patchy mild marrow fibrosis.   - Focal crush artifact.   - Results of outside consultative review and final diagnosis to follow  in an addendum report.    Pt refused surgery on 4/1, determined to have capacity to make medical decisions  After discussion with NSG, pt still refuse surgery,agreed to have him follow up in clinic w NSG and monitor tumor growth and behavior with serial MRIs and make a decision at a later time. Nichol is on board as well, He is agreement with no surgery and follow up as outpatient. He also wish to be DNR. Apixaban was resumed and ok per NSG. He will be discharged in stable condition.

## 2019-04-03 NOTE — ASSESSMENT & PLAN NOTE
Cannot rule out the possibility of embolic event      - repeat EKG  - continue metoprolol 12.5mg for rate control   - CHADSvasc 7,pt refused surgery 4/1,  after discussion w NSG, will have him follow up in clinic and monitor tumor growth and behavior with serial MRIs and make a decision at a later time.  He is agreement with no surgery and follow up as outpatient.  -    Apixaban was resumed and ok per NSG. He will be discharged in stable condition.

## 2019-04-03 NOTE — ASSESSMENT & PLAN NOTE
Per paliative care:    - Mr. Hogan amenable to DNR order - to be written by primary team.   - Recommend [facility] - palliative care will assist patient in completing.  Will require staff MD  signature   - Mr. Hogan is not amenable to making transition to hospice at this time.   -  Recommend home health with [facility] will continue to assist with hospice when appropriate with Mr. Hogan   - Mr. Hogan is a [employer] and established with [facility]. Care LCSW will complete referral   - emotional support

## 2019-04-03 NOTE — TELEPHONE ENCOUNTER
Pt is scheduled at [facility] neurosurgery 6/21/19 at 11:40am and MRI at imaging center at 10:45am.

## 2019-04-03 NOTE — PLAN OF CARE
[facility]    HOME HEALTH ORDERS/AIM program  FACE TO FACE ENCOUNTER    Patient Name: Bari Hogan  YOB: 1940    PCP: [facility]   PCP Address: [address] [place] [id number]  PCP Phone Number: [phone]  PCP Fax: [phone]    Encounter Date: 04/03/2019    Admit to Home Health    Diagnoses:  Active Hospital Problems    Diagnosis  POA    *Brain mass [G93.9]  Yes    Goals of care, counseling/discussion [Z71.89]  Not Applicable    Palliative care encounter [Z51.5]  Not Applicable    Dilatation of colon [K59.39]  Unknown    Pre-op evaluation [Z01.818]  Not Applicable    Alteration in skin integrity [R23.9]  Yes    Peripheral vascular disease of lower extremity [I73.9]  Yes    Seizures [R56.9]  Yes    Metastatic carcinoma to bone [C79.51]  Yes    Normocytic anemia [D64.9]  Yes    Type 2 diabetes mellitus with diabetic peripheral angiopathy without gangrene, without long-term current use of insulin [E11.51]  Yes    Nephrotic range proteinuria [R80.9]  Yes    Cerebrovascular accident (CVA) due to embolism of right middle cerebral artery [I63.411]  Yes    Atrial fibrillation [I48.91]  Yes      Resolved Hospital Problems   No resolved problems to display.       Future Appointments   Date Time Provider Department Center   6/21/2019 10:30 AM [facility]   6/21/2019 11:40 AM Sly Campoverde DO [facility] NEUROS7 Teja Smith     Follow-up Information     [facility].    Why:  Outpatient Services  Contact information:  [address] [place] [id number]  [phone]             Teja Smith - Neurosurgery 7th Fl In 1 week.    Specialty:  Neurosurgery  Contact information:  [address]  [facility] [address] [phone]  Additional information:  7th Floor                 I have seen and examined this patient face to face today. My clinical findings that support the  need for the home health skilled services and home bound status are the following:  Weakness/numbness causing balance and gait disturbance due to Weakness/Debility and Malignancy/Cancer making it taxing to leave home.    Allergies:  Review of patient's allergies indicates:   Allergen Reactions    Lisinopril      dizziness       Diet: regular diet    Activities: activity as tolerated    Nursing:   SN to complete comprehensive assessment including routine vital signs. Instruct on disease process and s/s of complications to report to MD. Review/verify medication list sent home with the patient at time of discharge  and instruct patient/caregiver as needed. Frequency may be adjusted depending on start of care date.    Notify MD if SBP > 160 or < 90; DBP > 90 or < 50; HR > 120 or < 50; Temp > 101      CONSULTS:    Physical Therapy to evaluate and treat. Evaluate for home safety and equipment needs; Establish/upgrade home exercise program. Perform / instruct on therapeutic exercises, gait training, transfer training, and Range of Motion.  Occupational Therapy to evaluate and treat. Evaluate home environment for safety and equipment needs. Perform/Instruct on transfers, ADL training, ROM, and therapeutic exercises.    MISCELLANEOUS CARE:  Routine Skin for Bedridden Patients: Instruct patient/caregiver to apply moisture barrier cream to all skin folds and wet areas in perineal area daily and after baths and all bowel movements.    Medications: Review discharge medications with patient and family and provide education.      Current Discharge Medication List      CONTINUE these medications which have NOT CHANGED    Details   allopurinol (ZYLOPRIM) 100 MG tablet Take 200 mg by mouth once daily.      amLODIPine (NORVASC) 10 MG tablet Take 10 mg by mouth once daily.      diclofenac sodium (VOLTAREN) 1 % Gel Apply 2 g topically daily as needed.      docusate sodium (COLACE) 100 MG capsule Take 1 capsule (100 mg total) by mouth  once daily.  Refills: 0      metoprolol succinate (TOPROL-XL) 25 MG 24 hr tablet Take 25 mg by mouth once daily.      acetaminophen (TYLENOL) 325 MG tablet Take 1 tablet (325 mg total) by mouth every 6 (six) hours as needed.  Refills: 0      albuterol-ipratropium (DUO-NEB) 2.5 mg-0.5 mg/3 mL nebulizer solution Take 3 mLs by nebulization every 8 (eight) hours. Rescue  Qty: 1 Box, Refills: 0      apixaban 5 mg Tab Take 1 tablet (5 mg total) by mouth 2 (two) times daily.  Qty: 60 tablet, Refills: 0      atorvastatin (LIPITOR) 40 MG tablet Take 1 tablet (40 mg total) by mouth once daily.  Qty: 90 tablet, Refills: 3      ergocalciferol (ERGOCALCIFEROL) 50,000 unit Cap Take 1 capsule (50,000 Units total) by mouth every 7 days.      ferrous sulfate (FEOSOL) 325 mg (65 mg iron) Tab tablet Take 1 tablet (325 mg total) by mouth 2 (two) times daily.  Refills: 0      levETIRAcetam (KEPPRA) 750 MG Tab Take 1 tablet (750 mg total) by mouth 2 (two) times daily.  Qty: 60 tablet, Refills: 11      pantoprazole (PROTONIX) 40 MG tablet Take 1 tablet (40 mg total) by mouth once daily.  Qty: 30 tablet, Refills: 11         STOP taking these medications       empagliflozin (JARDIANCE) 25 mg Tab Comments:   Reason for Stopping:         dextrose 50% injection Comments:   Reason for Stopping:         dextrose 50% injection Comments:   Reason for Stopping:         ferrous gluconate (FERGON) 325 MG Tab Comments:   Reason for Stopping:         insulin aspart U-100 (NOVOLOG) 100 unit/mL injection Comments:   Reason for Stopping:         ondansetron 4 mg/2 mL Soln Comments:   Reason for Stopping:               I certify that this patient is confined to his home and needs physical therapy and occupational therapy.    Nicole Storey MD.

## 2019-04-03 NOTE — SUBJECTIVE & OBJECTIVE
Interval History:     Past Medical History:   Diagnosis Date    Arthritis     Chronic constipation     Diabetes mellitus     Hypertension     Stroke     x2       Past Surgical History:   Procedure Laterality Date    Kyphoplasty-L2 and biopsy N/A 3/20/2019    Performed by Sarthak Son MD at [facility] OR       Review of patient's allergies indicates:   Allergen Reactions    Lisinopril      dizziness       Medications:  Continuous Infusions:   heparin (porcine) in D5W 12 Units/kg/hr (04/03/19 0716)     Scheduled Meds:   amLODIPine  10 mg Oral Daily    ascorbic acid (vitamin C)  500 mg Oral TID    atorvastatin  40 mg Oral Daily    bisacodyl  10 mg Rectal Daily    docusate sodium  1 enema Rectal Daily    levETIRAcetam  750 mg Oral BID    metoprolol succinate  12.5 mg Oral Daily    potassium chloride 10%  40 mEq Oral Once    simethicone  1 tablet Oral TID     PRN Meds:acetaminophen, dextrose 50%, glucose, glucose, heparin (PORCINE), heparin (PORCINE), lidocaine HCl 2%, sodium chloride 0.9%    Family History     None        Tobacco Use    Smoking status: Former Smoker   Substance and Sexual Activity    Alcohol use: No    Drug use: No    Sexual activity: Not on file       Review of Systems   Constitutional: Positive for activity change and fatigue.   HENT: Negative.    Respiratory: Negative.    Cardiovascular: Negative.    Musculoskeletal:        Left BKA   Neurological: Positive for facial asymmetry and weakness.   Psychiatric/Behavioral: Negative for agitation and confusion.     Objective:     Vital Signs (Most Recent):  Temp: 97.1 °F (36.2 °C) (04/03/19 0818)  Pulse: 81 (04/03/19 0818)  Resp: 16 (04/03/19 0818)  BP: 139/88 (04/03/19 0818)  SpO2: (!) 94 % (04/03/19 0818) Vital Signs (24h Range):  Temp:  [97.1 °F (36.2 °C)-98.8 °F (37.1 °C)] 97.1 °F (36.2 °C)  Pulse:  [75-82] 81  Resp:  [14-18] 16  SpO2:  [94 %-96 %] 94 %  BP: (118-147)/(57-88) 139/88     Weight: 89.7 kg (197 lb 12 oz)  Body mass  index is 30.07 kg/m².    Review of Symptoms  Symptom Assessment (ESAS 0-10 scale)   ESAS 0 1 2 3 4 5 6 7 8 9 10   Pain X             Dyspnea X             Anxiety X             Nausea X             Depression  X             Anorexia X             Fatigue X             Insomnia X             Restlessness  X             Agitation X             CAM / Delirium completed [facility] without  Missing any letters    Constipation     +   Diarrhea           __ --  ___+  Bowel Management Plan (BMP): Yes    Comments: no complaints of pain     Pain Assessment:    OME in 24 hours: 0     Performance Status: 50    ECOG Performance Status Grade: 2 - Ambulates, capable of self care only    Physical Exam   Constitutional: He is oriented to person, place, and time. He appears well-developed and well-nourished. No distress.   Cardiovascular: Normal rate, regular rhythm and normal heart sounds.   Pulmonary/Chest: Effort normal and breath sounds normal.   Abdominal: Soft. Bowel sounds are normal.   Musculoskeletal:   Left bka   Neurological: He is alert and oriented to person, place, and time.   Left sided weakness, delayed in responses   Skin: Skin is warm and dry.   Psychiatric: He has a normal mood and affect. His behavior is normal. Judgment and thought content normal.   Flat affect    Nursing note and vitals reviewed.      Significant Labs: All pertinent labs within the past 24 hours have been reviewed.  CBC:   Recent Labs   Lab 04/03/19  0529   WBC 4.77   HGB 11.8*   HCT 37.0*   MCV 83   *     BMP:  Recent Labs   Lab 04/03/19  0529   *      K 3.1*      CO2 21*   BUN 6*   CREATININE 1.0   CALCIUM 8.8   MG 1.6     LFT:  Lab Results   Component Value Date    AST 21 04/03/2019    ALKPHOS 78 04/03/2019    BILITOT 0.2 04/03/2019     Albumin:   Albumin   Date Value Ref Range Status   04/03/2019 2.2 (L) 3.5 - 5.2 g/dL Final     Protein:   Total Protein   Date Value Ref Range Status   04/03/2019 6.6 6.0 - 8.4 g/dL  Final     Lactic acid:   Lab Results   Component Value Date    LACTATE 0.8 04/01/2019    LACTATE 2.9 (HH) 03/16/2019       Significant Imaging: I have reviewed all pertinent imaging results/findings within the past 24 hours.    Advance Care Planning   Advanced Directives::  Living Will: No  LaPOST: No  Do Not Resuscitate Status: No  Medical Power of : Yes. Agent's Name: Malvin Osborn [phone].     Decision-Making Capacity: Patient answered questions       Living Arrangements: Lives with family, lives with son Malvin     Psychosocial/Cultural:  lives with son Malvin has 3 children Kasia Hernandes [phone] lives in [place], Balat Osborn lives in [facility] [facility]. And one son.  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      Spiritual:     F- Ivory and Belief: Hoahaoism     I - Importance:   .  C - Community:     A - Address in Care:  Amenable to  visits

## 2019-04-03 NOTE — PLAN OF CARE
Palliative Care:    EDGAR asked by [facility] JESSICA Rivera to assist [facility].     EDGAR made contact with Sadia Morrell LCSW [facility] Coordinator via email and made referral for [facility] follow up. EDGAR explained that pt is closely followed by [facility] system and [facility].     Fax sent including discharge summary, h&P, Pal Care consult and progress notes, face sheet and power of .      [facility] To follow up with pt regarding appointment.      Princess Saba LCSW, ACHP-SW

## 2019-04-04 NOTE — PLAN OF CARE
Problem: Adult Inpatient Plan of Care  Goal: Plan of Care Review  Outcome: Ongoing (interventions implemented as appropriate)     04/1940   Plan of Care Review   Plan of Care Reviewed With patient;caregiver   Progress no change   AA&Ox2 person and place.  Remains free from falls, denies pain, Resp unlabored color pink.  Updated on plan of care.  See RN note for updates about family involvement for discharge.  No significant events.

## 2019-04-04 NOTE — NURSING
Discharged from [facility] at about 20.30 PM. Removed IV cannula. Discharged summary reviewed with son, verbalized understanding. Wished him all the best. Went via wheelchair.

## 2019-04-04 NOTE — NURSING
Spoke with pt son Aldair and informed him of pt discharge.  Aldair stated that pt's grandson would be able to come and pick pt up after he gets off of work, but that he does not get off until 1730.  Aldair stated that he works in [employer] and would leave to come pick the pt up after that.  GERMÁN Barrientos RN in Case Management notified.  Pt made aware of discharge plan.  Will monitor until pt grandson arrives and can pick the pt up.

## 2019-04-15 PROBLEM — R56.9 SEIZURE: Status: ACTIVE | Noted: 2019-04-15

## 2019-04-17 PROBLEM — L98.499 ARTERIAL INSUFFICIENCY WITH ISCHEMIC ULCER: Status: ACTIVE | Noted: 2019-04-17

## 2019-04-17 PROBLEM — I77.1 ARTERIAL INSUFFICIENCY WITH ISCHEMIC ULCER: Status: ACTIVE | Noted: 2019-04-17

## 2019-04-20 PROBLEM — I73.9 PAD (PERIPHERAL ARTERY DISEASE): Status: ACTIVE | Noted: 2019-04-20

## 2019-05-10 PROBLEM — R63.8 INCREASED NUTRITIONAL NEEDS: Status: RESOLVED | Noted: 2019-03-18 | Resolved: 2019-01-01

## 2019-05-10 PROBLEM — R60.0 EDEMA OF RIGHT LOWER EXTREMITY: Status: ACTIVE | Noted: 2019-01-01

## 2019-05-10 PROBLEM — E63.9 INADEQUATE DIETARY ENERGY INTAKE: Status: RESOLVED | Noted: 2019-03-18 | Resolved: 2019-01-01

## 2019-06-13 NOTE — TELEPHONE ENCOUNTER
----- Message from Addie Corral sent at 6/13/2019 12:55 PM CDT -----  Elmira from [facility] would like to be called back to reschedule appointment pt Post Op appt later time 1:30 pm. Please      Elmira can be reached at [phone]      Thank you!

## 2019-06-13 NOTE — TELEPHONE ENCOUNTER
Spoke to Elmira informed her providers last appt on Fridays' are 1140, Elmira states she will work it out or call me back if she needs to make changes.

## 2019-06-21 NOTE — PROGRESS NOTES
CHIEF COMPLAINT:  Left frontal brain tumor    HPI:  79 year old male with PMHx HTN, DM2, recurrent Afib and CVAs on Eliquis with left sided paresis, was found to have left frontal extra-axial/fdural based tumor of unclear diagnosis after being admitted at [facility] in March 2019 for seizure activity.It was proposed that the patient undergo left frontal craniotomy for resection of the tumor to establish diagnosis and potentially remove the tumor.  Despite many conversations with the patient the family the patient ultimately declined surgery.  He presents today for follow-up and to determine if he has changed his mind regarding surgery.    Patient reports that he is at his baseline.  He denies any headaches, vision changes, speech problems, weakness beyond his baseline left-sided paresis, or changes in mentation.    He still is adamant that he does not want surgery under any circumstance.  He is content with his current existence.    Review of patient's allergies indicates:   Allergen Reactions    Lisinopril      dizziness       Past Medical History:   Diagnosis Date    Anemia     Arthritis     Atrial fibrillation     Chronic constipation     Diabetes mellitus     Hypertension     Seizures     Stroke     x2     Past Surgical History:   Procedure Laterality Date    Angiogram Extremity Unilateral N/A 4/25/2019    Performed by Brijesh Vasquez MD at [facility] CATH    Kyphoplasty-L2 and biopsy N/A 3/20/2019    Performed by Sarthak Son MD at [facility] OR    PTA, PERIPHERAL VESSEL N/A 4/24/2019    Performed by Brijesh Vasquez MD at [facility] CATH    PTA, PERIPHERAL VESSEL N/A 4/22/2019    Performed by Jabari Mchugh MD at [facility] CATH     History reviewed. No pertinent family history.  Social History     Tobacco Use    Smoking status: Former Smoker   Substance Use Topics    Alcohol use: No    Drug use: No        Review of Systems   Constitutional: Negative.    HENT: Negative for congestion, ear discharge, ear  pain, hearing loss, nosebleeds, sinus pain and tinnitus.    Eyes: Negative.    Respiratory: Negative.    Cardiovascular: Negative for chest pain, palpitations, claudication and leg swelling.   Gastrointestinal: Negative for abdominal pain, blood in stool, constipation, diarrhea, melena and vomiting.   Genitourinary: Negative for flank pain, frequency and urgency.   Musculoskeletal: Negative.  Negative for falls.   Skin: Negative.    Neurological: Positive for weakness (Baseline left-sided robyn paresis secondary to prior stroke). Negative for dizziness, tingling, tremors, sensory change, speech change, focal weakness, seizures, loss of consciousness and headaches.   Endo/Heme/Allergies: Does not bruise/bleed easily.   Psychiatric/Behavioral: Negative.        OBJECTIVE:   Vital Signs:  Temp: 97.7 °F (36.5 °C) (06/21/19 1305)  Pulse: 83 (06/21/19 1305)  BP: 127/75 (06/21/19 1305)    Physical Exam:    Vital signs: All nursing notes and vital signs reviewed -- afebrile, vital signs stable.  Constitutional: Patient sitting comfortably in wheel chair. Appears well developed and well nourished.  Skin: Exposed areas are intact without abnormal markings, rashes or other lesions.  HEENT: Normocephalic. Normal conjunctivae.  Cardiovascular: Normal rate and regular rhythm.  Respiratory: Chest wall rises and falls symmetrically, without signs of respiratory distress.  Abdomen: Soft and non-tender.  Extremities: Warm and without edema. Calves supple, non-tender.  Psych/Behavior: Normal affect.    Neurological:    Mental status: Alert and oriented. Conversational and appropriate.       Cranial Nerves: VFF to confrontation. PERRL. EOMI without nystagmus. Facial STLT normal and symmetric. Strong, symmetric muscles of mastication. Facial strength full and symmetric. Hearing equal bilaterally to finger rub. Palate and uvula rise and fall normally in midline. Shoulder shrug 5/5 strength. Tongue midline.     Motor:  5/5 RUE and RLE  L  hemiparesis (baseline)    L AKA    Left pronator drift    Sensory: Intact sensation to light touch in all extremities. Romberg negative.      Diagnostic Results:  All imaging was independently reviewed by me.    MRI brain, dated 6/20/19:  1. 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  It has remained stable on follow-up MRI.  Patient is at his neurologic baseline without any new deficits and the tumor is not causing significant mass effect.  Therefore, I do not recommend surgery at this time.  Furthermore, patient states that he will never want surgery regardless of new symptoms even if can be resected safely and with little morbidity.    - Patient declining further management (family supports his decision)  - RTC as needed or if he changes mind re: surgery or develops new symptoms    The patient understands and agrees with the plan of care. All questions were answered.            .    ;

## 2019-06-21 NOTE — TELEPHONE ENCOUNTER
Faxed clinic notes and mri results over to [facility] at [phone] and made it attn to SHAKEEL Pryor

## 2019-07-09 PROBLEM — Z89.612 HISTORY OF LEFT LOWER EXTREMITY AMPUTATION: Chronic | Status: ACTIVE | Noted: 2019-01-01

## 2019-07-16 PROBLEM — I48.20 CHRONIC ATRIAL FIBRILLATION WITH RVR: Status: ACTIVE | Noted: 2019-01-01

## 2019-07-17 PROBLEM — I51.89 LEFT VENTRICULAR DYSFUNCTION WITH REDUCED LEFT VENTRICULAR FUNCTION: Status: ACTIVE | Noted: 2019-01-01

## 2019-07-18 PROBLEM — T14.8XXA WOUND DRAINAGE: Status: ACTIVE | Noted: 2019-01-01

## 2019-09-02 PROBLEM — E11.9 DIABETES MELLITUS: Status: ACTIVE | Noted: 2019-01-01

## 2019-09-02 PROBLEM — A41.9 SEPSIS: Status: ACTIVE | Noted: 2019-01-01

## 2019-09-02 PROBLEM — N39.0 URINARY TRACT INFECTION WITHOUT HEMATURIA: Status: ACTIVE | Noted: 2019-01-01

## 2019-09-04 PROBLEM — E87.6 HYPOKALEMIA: Status: ACTIVE | Noted: 2019-01-01

## 2019-09-04 PROBLEM — R32 INCONTINENCE: Status: ACTIVE | Noted: 2019-01-01

## 2019-09-04 PROBLEM — L97.919 NON-PRESSURE CHRONIC ULCER OF RIGHT LOWER LEG: Status: ACTIVE | Noted: 2019-01-01

## 2019-09-09 PROBLEM — D50.0 IRON DEFICIENCY ANEMIA DUE TO CHRONIC BLOOD LOSS: Status: ACTIVE | Noted: 2019-01-01

## 2019-09-09 PROBLEM — D62 ACUTE BLOOD LOSS ANEMIA: Status: ACTIVE | Noted: 2019-01-01

## 2019-09-09 PROBLEM — I48.91 RAPID ATRIAL FIBRILLATION: Status: RESOLVED | Noted: 2018-07-09 | Resolved: 2019-01-01

## 2019-09-09 PROBLEM — N39.0 URINARY TRACT INFECTION WITHOUT HEMATURIA: Status: RESOLVED | Noted: 2019-01-01 | Resolved: 2019-01-01

## 2019-09-09 PROBLEM — R10.13 ABDOMINAL PAIN, EPIGASTRIC: Status: ACTIVE | Noted: 2019-01-01

## 2019-09-10 PROBLEM — A41.9 SEPSIS: Status: RESOLVED | Noted: 2019-01-01 | Resolved: 2019-01-01

## 2019-09-10 PROBLEM — N39.0 URINARY TRACT INFECTION WITHOUT HEMATURIA: Status: ACTIVE | Noted: 2019-01-01

## 2019-09-11 PROBLEM — R10.13 ABDOMINAL PAIN, EPIGASTRIC: Status: RESOLVED | Noted: 2019-01-01 | Resolved: 2019-01-01

## 2019-09-11 PROBLEM — N39.0 URINARY TRACT INFECTION WITHOUT HEMATURIA: Status: RESOLVED | Noted: 2019-01-01 | Resolved: 2019-01-01

## 2019-09-19 PROBLEM — R80.9 NEPHROTIC RANGE PROTEINURIA: Chronic | Status: ACTIVE | Noted: 2019-03-17

## 2019-09-19 PROBLEM — E11.51 TYPE 2 DIABETES MELLITUS WITH DIABETIC PERIPHERAL ANGIOPATHY WITHOUT GANGRENE, WITHOUT LONG-TERM CURRENT USE OF INSULIN: Chronic | Status: ACTIVE | Noted: 2019-03-17

## 2019-09-19 PROBLEM — I63.411 CEREBROVASCULAR ACCIDENT (CVA) DUE TO EMBOLISM OF RIGHT MIDDLE CEREBRAL ARTERY: Chronic | Status: ACTIVE | Noted: 2018-07-10

## 2019-09-19 PROBLEM — I48.20 CHRONIC ATRIAL FIBRILLATION: Chronic | Status: ACTIVE | Noted: 2019-01-01

## 2019-09-19 PROBLEM — E55.9 VITAMIN D DEFICIENCY: Chronic | Status: ACTIVE | Noted: 2019-03-18

## 2019-09-19 PROBLEM — D64.9 NORMOCYTIC ANEMIA: Chronic | Status: ACTIVE | Noted: 2019-03-17

## 2019-09-19 PROBLEM — I51.89 LEFT VENTRICULAR DYSFUNCTION WITH REDUCED LEFT VENTRICULAR FUNCTION: Chronic | Status: ACTIVE | Noted: 2019-01-01

## 2019-10-11 PROBLEM — E87.6 HYPOKALEMIA: Status: RESOLVED | Noted: 2019-01-01 | Resolved: 2019-01-01

## 2019-11-14 PROBLEM — L97.519 NON-HEALING ULCER OF RIGHT FOOT: Status: ACTIVE | Noted: 2019-01-01

## 2019-11-14 PROBLEM — E11.9 DM TYPE 2 (DIABETES MELLITUS, TYPE 2): Chronic | Status: ACTIVE | Noted: 2019-03-17

## 2020-01-10 PROBLEM — D50.0 IRON DEFICIENCY ANEMIA DUE TO CHRONIC BLOOD LOSS: Chronic | Status: ACTIVE | Noted: 2019-01-01

## 2020-01-10 PROBLEM — Z79.4 TYPE 2 DIABETES MELLITUS WITH DIABETIC PERIPHERAL ANGIOPATHY WITHOUT GANGRENE, WITH LONG-TERM CURRENT USE OF INSULIN: Chronic | Status: ACTIVE | Noted: 2019-03-17

## 2020-01-10 PROBLEM — K59.39 DILATATION OF COLON: Status: RESOLVED | Noted: 2019-03-31 | Resolved: 2020-01-01

## 2020-01-10 PROBLEM — I73.9 PAD (PERIPHERAL ARTERY DISEASE): Chronic | Status: ACTIVE | Noted: 2019-04-20

## 2020-01-10 PROBLEM — Z79.4 TYPE 2 DIABETES MELLITUS WITH DIABETIC PERIPHERAL ANGIOPATHY WITHOUT GANGRENE, WITH LONG-TERM CURRENT USE OF INSULIN: Status: ACTIVE | Noted: 2019-03-17

## 2020-02-28 PROBLEM — N17.9 AKI (ACUTE KIDNEY INJURY): Status: ACTIVE | Noted: 2020-01-01

## 2020-03-06 PROBLEM — R41.82 ALTERED MENTAL STATE: Status: RESOLVED | Noted: 2019-03-16 | Resolved: 2020-01-01

## 2020-03-06 PROBLEM — I10 ESSENTIAL HYPERTENSION: Chronic | Status: ACTIVE | Noted: 2018-07-10

## 2020-03-06 PROBLEM — G93.89 BRAIN MASS: Chronic | Status: ACTIVE | Noted: 2019-03-26

## 2020-03-06 PROBLEM — R97.20 ELEVATED PSA: Chronic | Status: ACTIVE | Noted: 2019-03-19

## 2020-04-01 PROBLEM — G40.901 STATUS EPILEPTICUS: Status: ACTIVE | Noted: 2020-01-01

## 2021-12-05 NOTE — H&P
[facility] Medicine  History & Physical    Patient Name: Bari Hogan  MRN: [id number]  Admission Date: 3/26/2019  Attending Physician: Jasmin Feliciano MD   Primary Care Provider: [facility] Medicine Team: [facility] [place] [facility] 3 Jay Brantley MD     Patient information was obtained from patient, past medical records and ER records.     Subjective:     Principal Problem:Brain mass    Chief Complaint: No chief complaint on file.       HPI: Mr Hogan is a 79 y.o M w/ a history significant for hypertension, CVA, and type 2 diabetes mellitus presented to OS w/ altered mental status on 03/16/2019.  An MRI of the brain was done which was significant for a left frontal meningeal mass with mild local mass effect and adjacent osseous erosion. The differential includes dural metastasis and atypical meningioma.  There was also a subacute to chronic right temporoparietal infarct.  An EEG was done which was consistent with seizure activity and the patient was loaded with Keppra and this has continued for prophylaxis.  Bone scan revealed lytic lesions in the left frontal bone and vertebral column.  The patient had biopsy of the L2 spine and kyphoplasty.  The results were significant for mild plasmacytosis and patchy mild marrow fibrosis.     [place] [facility] [place] for appropriate diagnosis and subsequent treatment plan.        Past Medical History:   Diagnosis Date    Arthritis     Chronic constipation     Diabetes mellitus     Hypertension     Stroke     x2       Past Surgical History:   Procedure Laterality Date    Kyphoplasty-L2 and biopsy N/A 3/20/2019    Performed by Sarthak Son MD at [facility] OR       Review of patient's allergies indicates:   Allergen Reactions    Lisinopril      dizziness       Current Facility-Administered Medications on File Prior to Encounter   Medication    [COMPLETED] potassium  chloride SA CR tablet 30 mEq    [DISCONTINUED] acetaminophen tablet 325 mg    [DISCONTINUED] albuterol-ipratropium 2.5 mg-0.5 mg/3 mL nebulizer solution 3 mL    [DISCONTINUED] atorvastatin tablet 40 mg    [DISCONTINUED] cyproheptadine 4 mg tablet 4 mg    [DISCONTINUED] dextrose 50% injection 12.5 g    [DISCONTINUED] dextrose 50% injection 25 g    [DISCONTINUED] docusate sodium capsule 100 mg    [DISCONTINUED] enoxaparin injection 40 mg    [DISCONTINUED] ergocalciferol capsule 50,000 Units    [DISCONTINUED] ferrous sulfate tablet 325 mg    [DISCONTINUED] glucagon (human recombinant) injection 1 mg    [DISCONTINUED] glucose chewable tablet 16 g    [DISCONTINUED] glucose chewable tablet 24 g    [DISCONTINUED] HYDROmorphone injection 0.5 mg    [DISCONTINUED] insulin aspart U-100 injection 1-14 Units    [DISCONTINUED] levETIRAcetam tablet 750 mg    [DISCONTINUED] ondansetron injection 4 mg    [DISCONTINUED] pantoprazole EC tablet 40 mg    [DISCONTINUED] polyethylene glycol packet 17 g     Current Outpatient Medications on File Prior to Encounter   Medication Sig    acetaminophen (TYLENOL) 325 MG tablet Take 1 tablet (325 mg total) by mouth every 6 (six) hours as needed.    albuterol-ipratropium (DUO-NEB) 2.5 mg-0.5 mg/3 mL nebulizer solution Take 3 mLs by nebulization every 8 (eight) hours. Rescue    amLODIPine (NORVASC) 10 MG tablet Take 10 mg by mouth once daily.    apixaban 5 mg Tab Take 1 tablet (5 mg total) by mouth 2 (two) times daily.    atorvastatin (LIPITOR) 40 MG tablet Take 1 tablet (40 mg total) by mouth once daily.    cyproheptadine (PERIACTIN) 4 mg tablet Take 1 tablet (4 mg total) by mouth 3 (three) times daily before meals.    dextrose 50% injection Inject 25 mLs (12.5 g total) into the vein as needed (between 51 - 70 mg/dL if patient cannnot take PO but has IV access.).    dextrose 50% injection Inject 50 mLs (25 g total) into the vein as needed (less than 50 mg/dL if patient  cannnot take PO but has IV access.).    docusate sodium (COLACE) 100 MG capsule Take 1 capsule (100 mg total) by mouth once daily.    [START ON 4/1/2019] ergocalciferol (ERGOCALCIFEROL) 50,000 unit Cap Take 1 capsule (50,000 Units total) by mouth every 7 days.    ferrous gluconate (FERGON) 325 MG Tab Take 325 mg by mouth daily with breakfast.    ferrous sulfate (FEOSOL) 325 mg (65 mg iron) Tab tablet Take 1 tablet (325 mg total) by mouth 2 (two) times daily.    glipiZIDE (GLUCOTROL) 10 MG tablet Take 10 mg by mouth 2 (two) times daily before meals.    glucose 4 GM chewable tablet Take 4 tablets (16 g total) by mouth as needed.    glucose 4 GM chewable tablet Take 6 tablets (24 g total) by mouth as needed.    insulin aspart U-100 (NOVOLOG) 100 unit/mL injection Inject 1-14 Units into the skin before meals and at bedtime as needed.    levETIRAcetam (KEPPRA) 750 MG Tab Take 1 tablet (750 mg total) by mouth 2 (two) times daily.    ondansetron 4 mg/2 mL Soln Inject 4 mg into the vein every 6 (six) hours as needed.    pantoprazole (PROTONIX) 40 MG tablet Take 1 tablet (40 mg total) by mouth once daily.    polyethylene glycol (GLYCOLAX) 17 gram PwPk Take 17 g by mouth once daily.    psyllium (METAMUCIL) packet Take 1 packet by mouth once daily.    terazosin (HYTRIN) 2 MG capsule Take 2 mg by mouth every evening.    triamterene-hydrochlorothiazide 75-50 mg (MAXZIDE) 75-50 mg per tablet Take 1 tablet by mouth once daily.     Family History     None        Tobacco Use    Smoking status: Former Smoker   Substance and Sexual Activity    Alcohol use: No    Drug use: No    Sexual activity: Not on file     Review of Systems   Constitutional: Positive for activity change. Negative for appetite change and fever.   HENT: Negative for trouble swallowing.    Respiratory: Negative for cough, shortness of breath and wheezing.    Cardiovascular: Negative for chest pain and palpitations.   Gastrointestinal: Positive for  constipation. Negative for abdominal pain, nausea and vomiting.   Genitourinary: Negative for decreased urine volume and hematuria.        [place]    Musculoskeletal: Positive for gait problem. Negative for back pain.        Left AKA   Skin: Positive for wound (right lateral leg  ulcers ).   Neurological: Positive for weakness (L>R). Negative for dizziness, seizures and speech difficulty.   Psychiatric/Behavioral: Negative for confusion. The patient is not nervous/anxio      Objective:     Vital Signs (Most Recent):  Temp: 97.5 °F (36.4 °C) (03/26/19 2316)  Pulse: 91 (03/26/19 2316)  Resp: 20 (03/26/19 2316)  BP: (!) 160/73 (03/26/19 2316)  SpO2: 98 % (03/26/19 2316) Vital Signs (24h Range):  Temp:  [96.1 °F (35.6 °C)-98 °F (36.7 °C)] 97.5 °F (36.4 °C)  Pulse:  [82-93] 91  Resp:  [16-20] 20  SpO2:  [98 %] 98 %  BP: (135-169)/(73-88) 160/73     Weight: 89.8 kg (198 lb)  Body mass index is 30.11 kg/m².    Constitutional: He is oriented to person, place, and time. He appears well-developed and well-nourished. He is cooperative.   HENT:   Head: Normocephalic and atraumatic.   Eyes: Pupils are equal, round, and reactive to light. Conjunctivae and EOM are normal.   Neck: Normal range of motion. Neck supple.   Cardiovascular: Normal rate and regular rhythm.   Pulmonary/Chest: Effort normal and breath sounds normal.   Abdominal: Soft. Bowel sounds are normal.   Musculoskeletal:   LEFT AKA    Neurological: He is alert and oriented to person, place, and time.   Skin: Skin is warm and dry.         Physical Exam   Constitutional: He is oriented to person, place, and time. He appears well-developed and well-nourished.   HENT:   Head: Normocephalic and atraumatic.   Eyes: Pupils are equal, round, and reactive to light. EOM are normal.   Neck: Normal range of motion. Neck supple.   Cardiovascular: Normal rate and regular rhythm.   Pulmonary/Chest: Effort normal and breath sounds normal.   Abdominal: Soft. Bowel sounds are normal.    Musculoskeletal: Normal range of motion. He exhibits deformity.   Increased tone/spasticity of the LUE throughout (chronic, dt old stroke)       Neurological: He is alert and oriented to person, place, and time.   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  Extraocular motions are normal.        Significant Labs: All pertinent labs within the past 24 hours have been reviewed.    Significant Imaging: I have reviewed all pertinent imaging results/findings within the past 24 hours.    Assessment/Plan:     * Brain mass  MRI of the brain with contrast revealed:  1. 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  Differential includes dural metastasis an atypical meningioma.  2. 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    Left frontal meningeal mass has been present on imaging since July 2018.     ?? Whether the left frontal meningeal mass with bony erosion is something other than the vertebral lesion @ L2. The L2 biopsy results are as follows:   - Mild plasmacytosis.   - Patchy mild marrow fibrosis.   - Focal crush artifact.   - Results of outside consultative review and final diagnosis to follow  in an addendum report.      - NPO at midnight  - neurosurgery consult      Seizures  EEG revealing hemispheric slowing and frequent sharp waves consistent with seizure activity. No previous history of seizures reported. The new onset could be  acute infarct or secondary to frontal lobe mass     - continue Keppra  - seizure precautions           Nephrotic range proteinuria  Probably secondary to diabetic nephropathy and hypertensive nephrosclerosis. Uprot/creat = 5.37 indicative of nephrotic range proteinuria.   -optimize blood pressure medications if renal function allows           Metastatic cancer to  bone  As noted on CT abd pelvis.  Source of these metastases is unclear.  Cannot rule out the possibility of CNS metastasis as well. Bone scan showing left frontal meningeal mass and lytic lesion on L2 consistent metastasis. There was also no lesions/mass seen in the chest which could indicate possible primary source in the  tract or thyroid. Thyroid ultrasound with no evidence of malignancy   - s/p L2 lesion pathology findings showing a 5-10% representation of plasma cells. This could be secondary to an abnormal reaction to something or the beginnings of multiple myeloma. The small percentage doesn't qualify for MM but it could be a result of such diseases including lymphoma or  waldenstom's macroglobulinemia. Patient can also have isolated plasmacytoma in the brain. If such is diagnosed then the treatment may require localized radiation.     - Patient will need biopsy of brain mass to confirm.   - urine kappa/lambda 1.61            Type 2 diabetes mellitus with diabetic peripheral angiopathy without gangrene, without long-term current use of insulin  - holding oral medications    - low dose sliding scale for NPO patient     Normocytic anemia  Anemia workup was ordered showing iron deficiency anemia most likely secondary to malignancy and poor intake.     - continue iron supplementation     - add vit c 500mg TID         Cerebrovascular accident (CVA) due to embolism of right middle cerebral artery  Patient with known previous CVA with deficits. CT head showing  Acute/subacute right temporoparietal infarct with mild local mass effect and small amount of faith-infarct hemorrhage posteriorly.    - seizure precautions    - patient not on AC currently; reviewed neurology notes from OSH- who recommended restarting AC for afib; will defer final decision to primary team            Atrial fibrillation  Cannot rule out the possibility of embolic event      - repeat EKG  - will start metoprolol 12.5mg for rate control   -  consider restarting anticoagulation after biopsy           VTE Risk Mitigation (From admission, onward)        Ordered     Place TARUN hose  Until discontinued      03/27/19 0053     Place sequential compression device  Until discontinued      03/27/19 0053     IP VTE HIGH RISK PATIENT  Once      03/27/19 0053             Jay Brantley MD  Department of Hospital Medicine   [facility]   See above
